# Patient Record
Sex: FEMALE | Race: WHITE | NOT HISPANIC OR LATINO | Employment: FULL TIME | URBAN - METROPOLITAN AREA
[De-identification: names, ages, dates, MRNs, and addresses within clinical notes are randomized per-mention and may not be internally consistent; named-entity substitution may affect disease eponyms.]

---

## 2017-01-11 DIAGNOSIS — E55.9 VITAMIN D DEFICIENCY: Primary | ICD-10-CM

## 2017-01-11 NOTE — TELEPHONE ENCOUNTER
Received refill request for vitamin D.  Last in clinic 6/2016 where vitamin D was checked and WNL. Ok to refill until next annual per protocol. Refill sent.

## 2017-06-22 ENCOUNTER — OFFICE VISIT (OUTPATIENT)
Dept: OBGYN | Facility: CLINIC | Age: 37
End: 2017-06-22
Attending: OBSTETRICS & GYNECOLOGY
Payer: COMMERCIAL

## 2017-06-22 VITALS
WEIGHT: 195.5 LBS | HEIGHT: 70 IN | DIASTOLIC BLOOD PRESSURE: 69 MMHG | HEART RATE: 76 BPM | SYSTOLIC BLOOD PRESSURE: 115 MMHG | BODY MASS INDEX: 27.99 KG/M2

## 2017-06-22 DIAGNOSIS — R79.89 LOW VITAMIN D LEVEL: ICD-10-CM

## 2017-06-22 DIAGNOSIS — Z00.00 VISIT FOR PREVENTIVE HEALTH EXAMINATION: ICD-10-CM

## 2017-06-22 DIAGNOSIS — Z01.419 ENCOUNTER FOR GYNECOLOGICAL EXAMINATION WITHOUT ABNORMAL FINDING: Primary | ICD-10-CM

## 2017-06-22 DIAGNOSIS — Z12.4 SCREENING FOR MALIGNANT NEOPLASM OF CERVIX: ICD-10-CM

## 2017-06-22 PROCEDURE — 82306 VITAMIN D 25 HYDROXY: CPT | Performed by: OBSTETRICS & GYNECOLOGY

## 2017-06-22 PROCEDURE — 36415 COLL VENOUS BLD VENIPUNCTURE: CPT | Performed by: OBSTETRICS & GYNECOLOGY

## 2017-06-22 PROCEDURE — 87624 HPV HI-RISK TYP POOLED RSLT: CPT | Performed by: OBSTETRICS & GYNECOLOGY

## 2017-06-22 PROCEDURE — 99213 OFFICE O/P EST LOW 20 MIN: CPT | Mod: ZF

## 2017-06-22 PROCEDURE — G0145 SCR C/V CYTO,THINLAYER,RESCR: HCPCS | Performed by: OBSTETRICS & GYNECOLOGY

## 2017-06-22 ASSESSMENT — PAIN SCALES - GENERAL: PAINLEVEL: NO PAIN (0)

## 2017-06-22 NOTE — LETTER
6/27/2017         Filomena Mendoza   [address]  [place] [address]      Dear Ms. Mendoza:    The results of your recent PAP Smear were normal. The HPV test will be back soon.  Your vitamin D level is just a little low.  For values less than 30 we recommend 7442-0217 IU/day for 8 weeks then decrease to 2000IU/day.  We should repeat the vitamin D level in 3 months.  Please let me know if you have questions.    Results for orders placed or performed in visit on 06/22/17   25- OH-Vitamin D   Result Value Ref Range    Vitamin D Deficiency screening 29 20 - 75 ug/L   Pap imaged thin layer screen with HPV - recommended age 30 - 65 years (select HPV order below)   Result Value Ref Range    PAP NIL     Copath Report         Patient Name: FILOMENA MENDOZA  MR#: [id number]  Specimen #: [id number]  Collected: 6/22/2017  Received: 6/23/2017  Reported: 6/26/2017 15:37  Ordering Phy(s): LAURIE MCKENNA    For improved result formatting, select 'View Enhanced Report Format'  under Linked Documents section.    SPECIMEN/STAIN PROCESS:  Pap imaged thin layer prep screening (Surepath, FocalPoint with guided  screening)       Pap-Cyto x 1, HPV ordered x 1    SOURCE: Cervical, endocervical  ----------------------------------------------------------------   Pap imaged thin layer prep screening (Surepath, FocalPoint with guided  screening)  SPECIMEN ADEQUACY:  Satisfactory for evaluation.  -Transformation zone component present.    CYTOLOGIC INTERPRETATION:    Negative for intraepithelial lesion or malignancy    Electronically signed out by:  SWETA Keene ( ASCP)    Processed and screened at [facility],  [facility]    CLINICAL HISTORY:  LMP: 6/17/2017  Previous normal p ap  Date of Last Pap: 6/15/2016, History of positive HPV: 2016,    Papanicolaou Test Limitations:  Cervical cytology is a screening test  with limited sensitivity; regular screening is critical for  cancer  prevention; Pap tests are primarily effective for the  diagnosis/prevention of squamous cell carcinoma, not adenocarcinomas or  other cancers.    TESTING LAB LOCATION:  [facility], [address]  [id number] [phone]    COLLECTION SITE:  Client:  [facility]  Location: [facility] (B)           Please note that test explanations are brief and do not reflect all diagnostic uses.  If you have any questions or concerns, please call the clinic at [phone].      Sincerely,      Catrina Edge MD

## 2017-06-22 NOTE — LETTER
6/29/2017         Filomena Mendoza   [address]  [place] [address]        Dear Ms. Mendoza:    The results of your recent pap and HPV were normal. This means you have cleared the HPV that was positive from your pap test one year ago.  Your next pap smear is due in 5 years.  Your vitamin D level is just below where we would like it, our goal is above 30.  We recommend that you take 5589-0833 IU/day for the next 8 weeks and then continue to supplement with 2000 IU/day.  Please let us know if you have any questions. Take care, Dr. Mckenna    Results for orders placed or performed in visit on 06/22/17   25- OH-Vitamin D   Result Value Ref Range    Vitamin D Deficiency screening 29 20 - 75 ug/L   Pap imaged thin layer screen with HPV - recommended age 30 - 65 years (select HPV order below)   Result Value Ref Range    PAP NIL     Copath Report         Patient Name: FILOMENA MENDOZA  MR#: [id number]  Specimen #: [id number]  Collected: 6/22/2017  Received: 6/23/2017  Reported: 6/26/2017 15:37  Ordering Phy(s): LAURIE MCKENNA    For improved result formatting, select 'View Enhanced Report Format'  under Linked Documents section.    SPECIMEN/STAIN PROCESS:  Pap imaged thin layer prep screening (Surepath, FocalPoint with guided  screening)       Pap-Cyto x 1, HPV ordered x 1    SOURCE: Cervical, endocervical  ----------------------------------------------------------------   Pap imaged thin layer prep screening (Surepath, FocalPoint with guided  screening)  SPECIMEN ADEQUACY:  Satisfactory for evaluation.  -Transformation zone component present.    CYTOLOGIC INTERPRETATION:    Negative for intraepithelial lesion or malignancy    Electronically signed out by:  SWETA Keene ( ASCP)    Processed and screened at [facility],  [facility]    CLINICAL HISTORY:  LMP: 6/17/2017  Previous normal p ap  Date of Last Pap: 6/15/2016, History of positive HPV:  2016,    Papanicolaou Test Limitations:  Cervical cytology is a screening test  with limited sensitivity; regular screening is critical for cancer  prevention; Pap tests are primarily effective for the  diagnosis/prevention of squamous cell carcinoma, not adenocarcinomas or  other cancers.    TESTING LAB LOCATION:  [facility], [address]  [address]  [phone]    COLLECTION SITE:  Client:  [facility]  Location: [facility] (B)     HPV High Risk Types DNA Cervical   Result Value Ref Range    HPV 16 DNA Negative NEG    HPV 18 DNA Negative NEG    Other HR HPV Negative NEG    Final Diagnosis       This patient's sample is negative for HPV DNA.  (Note)  METHODOLOGY:  The Roche chelsea 4800 system uses automated extraction,  simultaneous amplification of HPV (L1 region) and beta-globin,  followed by  real time detection of fluorescent labeled HPV and beta  globin using specific oligonucleotide probes . The test specifically  identifies types HPV 16 DNA and HPV 18 DNA while concurrently  detecting the rest of the high risk types (31, 33, 35, 39, 45, 51,  52, 56, 58, 59, 66 or 68).    COMMENTS:  This test is not intended for use as a screening device  for women under age 30 with normal cervical cytology.  Results should  be correlated with cytologic and histologic findings. Close clinical  followup is recommended.    This test was developed and its performance characteristics  determined by the [facility], [facility]. It has not been cleared or approved by the  FDA. The laboratory is regulated under CLIA as qualified to perform  high- complexity testing. This test is used for clinical purposes. It  should not be regarded as investigational or for research.        Specimen Description Cervical Cells  [id number]            Please note that test explanations are  brief and do not reflect all diagnostic uses.  If you have any questions or concerns, please call the clinic at [phone].      Sincerely,      Catrina Edge MD

## 2017-06-22 NOTE — PROGRESS NOTES
ROS        Progress Note    SUBJECTIVE:  Katty Mendoza is an 36 year old, , who requests an Annual Preventive Exam.       Concerns today include: Desires recheck of vitamin D.  Not currently using contraception, but not planning pregnancy either.  She is also due for repeat pap after NIL/other HR HPV 1 year ago.    Menstrual History:  Menstrual History 6/15/2016 6/15/2016 2017   LAST MENSTRUAL PERIOD 6/3/2016 - 2017   Menarche age - - -   Period Cycle (Days) - 24-28 -   Period Duration (Days) - 5 -   Method of Contraception - Condoms -   Period Pattern - Regular -   Menstrual Flow - Moderate -   Menstrual Control - - -   Dysmenorrhea - - -   Reviewed Today - Yes -       Last    Lab Results   Component Value Date    PAP NIL 06/15/2016     History of abnormal Pap smear: YES - updated in Problem List and Health Maintenance accordingly    Last   Lab Results   Component Value Date    HPV16 Negative 06/15/2016     Last   Lab Results   Component Value Date    HPV18 Negative 06/15/2016     Last   Lab Results   Component Value Date    HRHPV Positive 06/15/2016       Mammogram current: not applicable  Last Mammogram:   No results found.     Last Colonoscopy:  No results found for this or any previous visit.      HISTORY:    Current Outpatient Prescriptions on File Prior to Visit:  tretinoin (RETIN-A) 0.025 % cream Apply a pea sized amount nightly   Omega-3 Fatty Acids (OMEGA-3 FISH OIL PO)    cholecalciferol (VITAMIN  -D) 1000 UNITS capsule Take 4 capsules (4,000 Units) by mouth daily Take one capsule daily. (Patient not taking: Reported on 2017)     No current facility-administered medications on file prior to visit.   No Known Allergies  Immunization History   Administered Date(s) Administered     HPVQuadrivalent 2007, 2007, 10/23/2008     Hepatitis B 2004, 2005     Influenza (IIV3) 2005, 2005, 10/23/2008     TD (ADULT, 7+) 1996, 2002, 2005     Tdap  (Adacel,Boostrix) 2009       Obstetric History       T0      L0     SAB0   TAB0   Ectopic0   Multiple0   Live Births0      Past Medical History:   Diagnosis Date     Anxiety      Past Surgical History:   Procedure Laterality Date     BREAST BIOPSY, RT/LT Right 2008    times 2; fibroadenoma     BREAST SURGERY  ,2013     GYN SURGERY  2002     OOPHORECTOMY Right 2002    complete Dr. Villalta     OOPHORECTOMY Left 2002    partial left  Dr. Villalta     Family History   Problem Relation Age of Onset     CANCER Paternal Grandmother      melanoma     Unknown/Adopted No family hx of      Asthma No family hx of      C.A.D. 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     Social History Main Topics     Smoking status: Never Smoker     Smokeless tobacco: Never Used     Alcohol use 0.5 - 1.0 oz/week     Drug use: No     Sexual activity: Not Currently     Partners: Male     Birth control/ protection: Condom, None     Other Topics Concern      Service No     Blood Transfusions No     Caffeine Concern No     Occupational Exposure No     Hobby Hazards No     Sleep Concern No     Stress Concern Yes     needs alone time     Weight Concern No     Special Diet No     Back Care No     Exercise Yes     Bike Helmet Yes     Seat Belt Yes     Self-Exams Yes     Social History Narrative    How much exercise per week? Runs 3-4 times per week    How much calcium per day? Some, milk and yogurt       How much caffeine per day? 1-2 cups per day    How much vitamin D per day? None    Do you/your family wear seatbelts?  Yes    Do you/your family use safety helmets? Yes    Do you/your  "family use sunscreen? Yes    Do you/your family keep firearms in the home? No    Do you/your family have a smoke detector(s)? Yes        Do you feel safe in your home? Yes    Has anyone ever touched you in an unwanted manner? No     Explain          Maida 10, 2015 Estela Rodriguez LPN                    5/14/14        Lives in [place]. Enjoys running and road biking.     Dating, heterosexual.     Megan Uriostegui MD               ROS  [unfilled]  PHQ-9 SCORE 6/10/2015   Total Score 2     AGUSTIN-7 SCORE 6/15/2016   Total Score 4 (minimal anxiety)         EXAM:  Blood pressure 115/69, pulse 76, height 1.778 m (5' 10\"), weight 88.7 kg (195 lb 8 oz), last menstrual period 06/17/2017, not currently breastfeeding. Body mass index is 28.05 kg/(m^2).  General - pleasant female in no acute distress.  Skin - no suspicious lesions or rashes  EENT-  PERRLA, euthyroid with out palpable nodules  Neck - supple without lymphadenopathy.  Lungs - clear to auscultation bilaterally.  Heart - regular rate and rhythm without murmur.  Abdomen - soft, nontender, nondistended, no masses or organomegaly noted.  Musculoskeletal - no gross deformities.  Neurological - normal strength, sensation, and mental status.    Breast Exam:  Breast: Without visible skin changes. No dimpling or lesions seen.   Breasts supple, non-tender with palpation, no dominant mass, nodularity, or nipple discharge noted bilaterally. Axillary nodes negative.      Pelvic Exam:  EG/BUS: Normal genital architecture without lesions, erythema or abnormal secretions Bartholin's, Urethra, [place]'s normal   Urethral meatus: normal   Urethra: no masses, tenderness, or scarring   Bladder: no masses or tenderness   Vagina: moist, pink, rugae with creamy, white and odorless  secretions  Cervix: Nulliparous, and no lesions  Uterus: anteverted,  and small, smooth, firm, mobile w/o pain  Adnexa: Within normal limits and No masses, nodularity, " tenderness  Rectum:anus normal       ASSESSMENT:  Encounter Diagnoses   Name Primary?     Encounter for gynecological examination without abnormal finding Yes     Low vitamin D level      Screening for malignant neoplasm of cervix      Visit for preventive health examination         PLAN:   Orders Placed This Encounter   Procedures     Pelvic and Breast Exam Procedure []     Pap Smear Exam []     25- OH-Vitamin D     Pap imaged thin layer screen with HPV - recommended age 30 - 65 years (select HPV order below)     HPV High Risk Types DNA Cervical     No orders of the defined types were placed in this encounter.      Additional teaching done at this visit regarding birth control which she declines for now and pap smear follow-up.    Return to clinic in one year.  Follow-up as needed.      Catrina Edge MD

## 2017-06-22 NOTE — LETTER
2017       RE: Katty Mendoza  [address]  [place] [address]     Dear Colleague,    Thank you for referring your patient, Katty Mendoza, to the [facility] at [facility]. Please see a copy of my visit note below.    ROS        Progress Note    SUBJECTIVE:  Katty Mendoza is an 36 year old, , who requests an Annual Preventive Exam.       Concerns today include: Desires recheck of vitamin D.  Not currently using contraception, but not planning pregnancy either.  She is also due for repeat pap after NIL/other HR HPV 1 year ago.    Menstrual History:  Menstrual History 6/15/2016 6/15/2016 2017   LAST MENSTRUAL PERIOD 6/3/2016 - 2017   Menarche age - - -   Period Cycle (Days) - 24-28 -   Period Duration (Days) - 5 -   Method of Contraception - Condoms -   Period Pattern - Regular -   Menstrual Flow - Moderate -   Menstrual Control - - -   Dysmenorrhea - - -   Reviewed Today - Yes -       Last    Lab Results   Component Value Date    PAP NIL 06/15/2016     History of abnormal Pap smear: YES - updated in Problem List and Health Maintenance accordingly    Last   Lab Results   Component Value Date    HPV16 Negative 06/15/2016     Last   Lab Results   Component Value Date    HPV18 Negative 06/15/2016     Last   Lab Results   Component Value Date    HRHPV Positive 06/15/2016       Mammogram current: not applicable  Last Mammogram:   No results found.     Last Colonoscopy:  No results found for this or any previous visit.      HISTORY:    Current Outpatient Prescriptions on File Prior to Visit:  tretinoin (RETIN-A) 0.025 % cream Apply a pea sized amount nightly   Omega-3 Fatty Acids (OMEGA-3 FISH OIL PO)    cholecalciferol (VITAMIN  -D) 1000 UNITS capsule Take 4 capsules (4,000 Units) by mouth daily Take one capsule daily. (Patient not taking: Reported on 2017)     No current facility-administered medications on file  prior to visit.   No Known Allergies  Immunization History   Administered Date(s) Administered     HPVQuadrivalent 2007, 2007, 10/23/2008     Hepatitis B 2004, 2005     Influenza (IIV3) 2005, 2005, 10/23/2008     TD (ADULT, 7+) 1996, 2002, 2005     Tdap (Adacel,Boostrix) 2009       Obstetric History       T0      L0     SAB0   TAB0   Ectopic0   Multiple0   Live Births0      Past Medical History:   Diagnosis Date     Anxiety      Past Surgical History:   Procedure Laterality Date     BREAST BIOPSY, RT/LT Right 2008    times 2; fibroadenoma     BREAST SURGERY  ,     GYN SURGERY  2002     OOPHORECTOMY Right     complete Dr. Villalta     OOPHORECTOMY Left 2002    partial left  Dr. Villalta     Family History   Problem Relation Age of Onset     CANCER Paternal Grandmother      melanoma     Unknown/Adopted No family hx of      Asthma No family hx of      C.A.D. 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     Social History Main Topics     Smoking status: Never Smoker     Smokeless tobacco: Never Used     Alcohol use 0.5 - 1.0 oz/week     Drug use: No     Sexual activity: Not Currently     Partners: Male     Birth control/ protection: Condom, None     Other Topics Concern      Service No     Blood Transfusions No     Caffeine Concern No     Occupational Exposure No     Hobby Hazards No     Sleep Concern No     Stress Concern Yes     needs alone time     Weight Concern No     Special Diet No     Back Care No     Exercise Yes     Bike Helmet Yes     Seat Belt Yes     Self-Exams Yes     Social  "History Narrative    How much exercise per week? Runs 3-4 times per week    How much calcium per day? Some, milk and yogurt       How much caffeine per day? 1-2 cups per day    How much vitamin D per day? None    Do you/your family wear seatbelts?  Yes    Do you/your family use safety helmets? Yes    Do you/your family use sunscreen? Yes    Do you/your family keep firearms in the home? No    Do you/your family have a smoke detector(s)? Yes        Do you feel safe in your home? Yes    Has anyone ever touched you in an unwanted manner? No     Explain          Maida 10, 2015 Estela Rodriguez LPN                    5/14/14        Lives in [place] the [employer]. Enjoys running and road biking.     Dating, heterosexual.     Megan Uriostegui MD               ROS  [unfilled]  PHQ-9 SCORE 6/10/2015   Total Score 2     AGUSTIN-7 SCORE 6/15/2016   Total Score 4 (minimal anxiety)         EXAM:  Blood pressure 115/69, pulse 76, height 1.778 m (5' 10\"), weight 88.7 kg (195 lb 8 oz), last menstrual period 06/17/2017, not currently breastfeeding. Body mass index is 28.05 kg/(m^2).  General - pleasant female in no acute distress.  Skin - no suspicious lesions or rashes  EENT-  PERRLA, euthyroid with out palpable nodules  Neck - supple without lymphadenopathy.  Lungs - clear to auscultation bilaterally.  Heart - regular rate and rhythm without murmur.  Abdomen - soft, nontender, nondistended, no masses or organomegaly noted.  Musculoskeletal - no gross deformities.  Neurological - normal strength, sensation, and mental status.    Breast Exam:  Breast: Without visible skin changes. No dimpling or lesions seen.   Breasts supple, non-tender with palpation, no dominant mass, nodularity, or nipple discharge noted bilaterally. Axillary nodes negative.      Pelvic Exam:  EG/BUS: Normal genital architecture without lesions, erythema or abnormal secretions Bartholin's, Urethra, [place]'s normal   Urethral meatus: normal   Urethra: no " masses, tenderness, or scarring   Bladder: no masses or tenderness   Vagina: moist, pink, rugae with creamy, white and odorless  secretions  Cervix: Nulliparous, and no lesions  Uterus: anteverted,  and small, smooth, firm, mobile w/o pain  Adnexa: Within normal limits and No masses, nodularity, tenderness  Rectum:anus normal       ASSESSMENT:  Encounter Diagnoses   Name Primary?     Encounter for gynecological examination without abnormal finding Yes     Low vitamin D level      Screening for malignant neoplasm of cervix      Visit for preventive health examination         PLAN:   Orders Placed This Encounter   Procedures     Pelvic and Breast Exam Procedure []     Pap Smear Exam []     25- OH-Vitamin D     Pap imaged thin layer screen with HPV - recommended age 30 - 65 years (select HPV order below)     HPV High Risk Types DNA Cervical     No orders of the defined types were placed in this encounter.      Additional teaching done at this visit regarding birth control which she declines for now and pap smear follow-up.    Return to clinic in one year.  Follow-up as needed.  Catrina Edge MD

## 2017-06-22 NOTE — MR AVS SNAPSHOT
After Visit Summary   6/22/2017    Katty Mendoza    MRN: [id number]           Patient Information     Date Of Birth          1980        Visit Information        Provider Department      6/22/2017 3:00 PM Catrina Edge MD [facility]        Today's Diagnoses     Encounter for gynecological examination without abnormal finding    -  1    Low vitamin D level        Screening for malignant neoplasm of cervix        Visit for preventive health examination          Care Instructions      PREVENTIVE HEALTH RECOMMENDATIONS:   Most women need a yearly breast and pelvic exam.    A PAP screen, a test done DURING a pelvic exam, is NO longer recommended yearly.    March 2013, screening guidelines recommended by ACOG for PAP screen are:    1) First pap at age 21.    2) Pap every 3 years until age 30.    3) After age 30, pap every 3 years or Pap with HR HPV screen every 5 years until age 65.  4) Women do NOT need a vaginal Pap screen after a hysterectomy (surgical removal of the uterus) when they have not had cancer.    Exceptions:  1) Yearly pap if HIV+ or immunosuppressed secondary to organ transplant  2) ELAINA II-III continue routine screening for 20 years.    I encourage you continue looking for opportunities to choose a healthy lifestyle:       * Choose to eat a heart healthy diet. Check out the FOOD PLATE guidelines at: http://www.choosemyplate.gov/ for helpful hints on weight and cholesterol management.  Balance your caloric intake with exercise to maintain a BMI in the 22 to 26 range. For bone health: Eat calcium-rich foods like yogurt, broccoli or take chewable calcium pills (500 to 600 mg) twice a day with food.       * Exercise for at least an average of 30 minutes a day, 5 days of the week. This will help you control your weight, release stress, and help prevent disease.      * Take a Vitamin D3 supplement daily fall through spring and during summer unless you [id number]' full  body sun exposure to skin without sunscreen.      * DO wear sunscreen to prevent skin cancer after the first 15-30 minutes.      * Identify stressors in your life, find ways to release the stress, and, make time for yourself. PLEASE ask for help if mood changes last longer than two weeks.     * Limit alcohol to one drink per day.  No smoking.  Avoid second hand smoke. If you smoke, ask for help to stop.       *  If you are in a sexual relationship, talk with your partner about possible infection risks and take action to protect yourself from exposure to a sexual infection.    Please request an infection screen for STIs (sexually transmitted infections) if you are less than age 26 OR believe that you may be at risk.     Get a flu shot each year. Get a tetanus shot every 10 years. EVERYONE needs a pertussis (Whooping cough) booster.    See your dentist twice a year for an exam and preventive care cleaning.     Consider the following screen tests:    1) cholesterol test every 5 years.     2) yearly mammogram after age 40 unless you have identified risks.    3) colonoscopy every 10 years after age 50 unless you have identified risks.    4) diabetes blood test screening if you are at risk for diabetes.      Additional information that you may also find helpful:  The Internet now gives us access to LOTS of information -- some of it helpful, research documented and also plenty of harmful, anecdotal information that may not pertain to your situtaion. Consider visiting the following websites for accurate health information:    www.vitamindcouncil.org/ : Info and ongoing research re Vitamin D    www.fairview.org : Up to date and easily searchable information on multiple topics.    www.medlineplus.gov : medication info, interactive tutorials, watch real surgeries online    www.cdc.gov : public health info, travel advisories, epidemics (H1N1)    www.jennifer/std.org: current research re diagnosis, treatment and prevention of  sexually contacted infections.    www.health.state.[place] : [place] dept of heat, public health issues in [place], N1N1    www.familydoctor.org : good info from the Academy of Family Physicians                Follow-ups after your visit        Follow-up notes from your care team     Return in 1 year (on 2018) for Preventative Health Visit.      Your next 10 appointments already scheduled     2017  8:30 AM CDT   (Arrive by 8:15 AM)   Return Visit with Olga Mondragon MD   [facility] ([facility])    [address] [place] [id number] [phone]              Who to contact     Please call your clinic at [phone] to:    Ask questions about your health    Make or cancel appointments    Discuss your medicines    Learn about your test results    Speak to your doctor   If you have compliments or concerns about an experience at your clinic, or if you wish to file a complaint, please contact [facility] Patient Relations at [phone] or email us at [email or web address]         Additional Information About Your Visit        Vir[employer] System[employer]art Information     [employer] is an electronic gateway that provides easy, online access to your medical records. With coin4ce, you can request a clinic appointment, read your test results, renew a prescription or communicate with your care team.     To sign up for [employer] visit the website at [email or web address]   You will be asked to enter the access code listed below, as well as some personal information. Please follow the directions to create your username and password.     Your access code is: [email or web address]  Expires: 2017  6:31 AM     Your access code will  in 90 days. If you need help or a new code, please contact your [facility] or call [phone] for assistance.        Care EveryWhere ID     This is your Care EveryWhere ID. This could  "be used by other organizations to access your [place] medical records  [id number]        Your Vitals Were     Pulse Height Last Period Breastfeeding? BMI (Body Mass Index)       76 1.778 m (5' 10\") 06/17/2017 No 28.05 kg/m2        Blood Pressure from Last 3 Encounters:   06/22/17 115/69   06/15/16 114/72   01/14/16 119/80    Weight from Last 3 Encounters:   06/22/17 88.7 kg (195 lb 8 oz)   06/15/16 83 kg (182 lb 14.4 oz)   01/14/16 85.9 kg (189 lb 4.8 oz)              We Performed the Following     25- [place]-Vitamin D     HPV High Risk Types DNA Cervical     Pap imaged thin layer screen with HPV - recommended age 30 - 65 years (select HPV order below)     Pap Smear Exam []     Pelvic and Breast Exam Procedure []        Primary Care Provider    Physician No Ref-Primary       No address on file        Equal Access to Services     KATJA JAMES : Hadii luigi Morris, waaxda lorena, qaybta kaalmada lynn, france guallpa . So [facility] [phone].    ATENCIÓN: Si habla ian, tiene a terrell disposición servicios gratuitos de asistencia lingüística. Llame al [phone].    We comply with applicable federal civil rights laws and [place] laws. We do not discriminate on the basis of race, color, national origin, age, disability sex, sexual orientation or gender identity.            Thank you!     Thank you for choosing [facility]  for your care. Our goal is always to provide you with excellent care. Hearing back from our patients is one way we can continue to improve our services. Please take a few minutes to complete the written survey that you may receive in the mail after your visit with us. Thank you!             Your Updated Medication List - Protect others around you: Learn how to safely use, store and throw away your medicines at www.disposemymeds.org.          This list is accurate as of: 6/22/17 11:59 PM.  Always use your most recent med list. "                   Brand Name Dispense Instructions for use Diagnosis    cholecalciferol 1000 UNITS capsule    vitamin  -D    360 capsule    Take 4 capsules (4,000 Units) by mouth daily Take one capsule daily.    Vitamin D deficiency       OMEGA-3 FISH OIL PO           tretinoin 0.025 % cream    RETIN-A    45 g    Apply a pea sized amount nightly    Acne vulgaris

## 2017-06-23 LAB — DEPRECATED CALCIDIOL+CALCIFEROL SERPL-MC: 29 UG/L (ref 20–75)

## 2017-06-26 LAB
COPATH REPORT: NORMAL
PAP: NORMAL

## 2017-06-27 LAB
FINAL DIAGNOSIS: NORMAL
HPV HR 12 DNA CVX QL NAA+PROBE: NEGATIVE
HPV16 DNA SPEC QL NAA+PROBE: NEGATIVE
HPV18 DNA SPEC QL NAA+PROBE: NEGATIVE
SPECIMEN DESCRIPTION: NORMAL

## 2017-06-30 NOTE — PATIENT INSTRUCTIONS

## 2018-03-09 ENCOUNTER — OFFICE VISIT (OUTPATIENT)
Dept: INTERNAL MEDICINE | Facility: CLINIC | Age: 38
End: 2018-03-09
Attending: INTERNAL MEDICINE
Payer: COMMERCIAL

## 2018-03-09 VITALS
HEIGHT: 70 IN | DIASTOLIC BLOOD PRESSURE: 92 MMHG | HEART RATE: 68 BPM | SYSTOLIC BLOOD PRESSURE: 133 MMHG | WEIGHT: 192.3 LBS | BODY MASS INDEX: 27.53 KG/M2

## 2018-03-09 DIAGNOSIS — K64.4 EXTERNAL HEMORRHOIDS: Primary | ICD-10-CM

## 2018-03-09 NOTE — PATIENT INSTRUCTIONS
Colace (Docusate)--to soften stools around time expect constipation   100 mg twice/day    --If having constipation--try taking miralax daily. May be something to add as preparing to travel.       Hemorrhoids    Hemorrhoids are swollen and inflamed veins inside the rectum and near the anus. The rectum is the last several inches of the colon. The anus is the passage between the rectum and the outside of the body.  Causes  The veins can become swollen due to increased pressure in them. This is most often caused by:    Chronic constipation or diarrhea    Straining when having a bowel movement    Sitting too long on the toilet    A low-fiber diet    Pregnancy  Symptoms    Bleeding from the rectum (this may be noticeable after bowel movements)    Lump near the anus    Itching around the anus    Pain around the anus  There are different types of hemorrhoids. Depending on the type you have and the severity, you may be able to treat yourself at home. In some cases, a procedure may be the best treatment option. Your healthcare provider can tell you more about this, if needed.  Home care  General care    To get relief from pain or itching, try:    Topical products. Your healthcare provider may prescribe or recommend creams, ointments, or pads that can be applied to the hemorrhoid. Use these exactly as directed.    Medicines. Your healthcare provider may recommend stool softeners, suppositories, or laxatives to help manage constipation. Use these exactly as directed.    Sitz baths. A sitz bath involves sitting in a few inches of warm bath water. Be careful not to make the water so hot that you burn yourself--test it before sitting in it. Soak for about 10 to 15 minutes a few times a day. This may help relieve pain.  Tips to help prevent hemorrhoids    Eat more fiber. Fiber adds bulk to stool and absorbs water as it moves through your colon. This makes stool softer and easier to pass.    Increase the fiber in your diet with more  fiber-rich foods. These include fresh fruit, vegetables, and whole grains.    Take a fiber supplement or bulking agent, if advised to by your provider. These include products such as psyllium or methylcellulose.    Drink plenty of water, if directed to by your provider. This can help keep stool soft.    Be more active. Frequent exercise aids digestion and helps prevent constipation. It may also help make bowel movements more regular.    Don t strain during bowel movements. This can make hemorrhoids more likely. Also, don t sit on the toilet for long periods of time.  Follow-up care  Follow up with your healthcare provider, or as advised. If a culture or imaging tests were done, you will be notified of the results when they are ready. This may take a few days or longer.  When to seek medical advice  Call your healthcare provider right away if any of these occur:    Increased bleeding from the rectum    Increased pain around the rectum or anus    Weakness or dizziness  Call 911  Call 911 or return to the emergency department right away if any of these occur:    Trouble breathing or swallowing    Fainting or loss of consciousness    Unusually fast heart rate    Vomiting blood    Large amounts of blood in stool  Date Last Reviewed: 6/22/2015 2000-2017 [employer] [address] [place] [address], [place] [address]. All rights reserved. This information is not intended as a substitute for professional medical care. Always follow your healthcare professional's instructions.

## 2018-03-09 NOTE — MR AVS SNAPSHOT
After Visit Summary   3/9/2018    Katty Mendoza    MRN: [id number]           Patient Information     Date Of Birth          1980        Visit Information        Provider Department      3/9/2018 7:30 AM Cheri Tatum MD [facility]         Care Instructions    Colace (Docusate)--to soften stools around time expect constipation   100 mg twice/day    --If having constipation--try taking miralax daily. May be something to add as preparing to travel.       Hemorrhoids    Hemorrhoids are swollen and inflamed veins inside the rectum and near the anus. The rectum is the last several inches of the colon. The anus is the passage between the rectum and the outside of the body.  Causes  The veins can become swollen due to increased pressure in them. This is most often caused by:    Chronic constipation or diarrhea    Straining when having a bowel movement    Sitting too long on the toilet    A low-fiber diet    Pregnancy  Symptoms    Bleeding from the rectum (this may be noticeable after bowel movements)    Lump near the anus    Itching around the anus    Pain around the anus  There are different types of hemorrhoids. Depending on the type you have and the severity, you may be able to treat yourself at home. In some cases, a procedure may be the best treatment option. Your healthcare provider can tell you more about this, if needed.  Home care  General care    To get relief from pain or itching, try:    Topical products. Your healthcare provider may prescribe or recommend creams, ointments, or pads that can be applied to the hemorrhoid. Use these exactly as directed.    Medicines. Your healthcare provider may recommend stool softeners, suppositories, or laxatives to help manage constipation. Use these exactly as directed.    Sitz baths. A sitz bath involves sitting in a few inches of warm bath water. Be careful not to make the water so hot that you burn yourself--test it  before sitting in it. Soak for about 10 to 15 minutes a few times a day. This may help relieve pain.  Tips to help prevent hemorrhoids    Eat more fiber. Fiber adds bulk to stool and absorbs water as it moves through your colon. This makes stool softer and easier to pass.    Increase the fiber in your diet with more fiber-rich foods. These include fresh fruit, vegetables, and whole grains.    Take a fiber supplement or bulking agent, if advised to by your provider. These include products such as psyllium or methylcellulose.    Drink plenty of water, if directed to by your provider. This can help keep stool soft.    Be more active. Frequent exercise aids digestion and helps prevent constipation. It may also help make bowel movements more regular.    Don t strain during bowel movements. This can make hemorrhoids more likely. Also, don t sit on the toilet for long periods of time.  Follow-up care  Follow up with your healthcare provider, or as advised. If a culture or imaging tests were done, you will be notified of the results when they are ready. This may take a few days or longer.  When to seek medical advice  Call your healthcare provider right away if any of these occur:    Increased bleeding from the rectum    Increased pain around the rectum or anus    Weakness or dizziness  Call 911  Call 911 or return to the emergency department right away if any of these occur:    Trouble breathing or swallowing    Fainting or loss of consciousness    Unusually fast heart rate    Vomiting blood    Large amounts of blood in stool  Date Last Reviewed: 6/22/2015 2000-2017 [employer]. [address] [place] [address], [place] [address]. All rights reserved. This information is not intended as a substitute for professional medical care. Always follow your healthcare professional's instructions.                Follow-ups after your visit        Your next 10 appointments already scheduled     Jun 27, 2018  8:00 AM CDT  "  Annual Visit with Megan Uriostegui MD   [facility] ([facility])    [facility] [address]  [address]  [facility] [id number] [phone]              Who to contact     Please call your clinic at [phone] to:    Ask questions about your health    Make or cancel appointments    Discuss your medicines    Learn about your test results    Speak to your doctor            Additional Information About Your Visit        MyChart Information     [employer] is an electronic gateway that provides easy, online access to your medical records. With [employer], you can request a clinic appointment, read your test results, renew a prescription or communicate with your care team.     To sign up for [employer] visit the website at [email or web address]   You will be asked to enter the access code listed below, as well as some personal information. Please follow the directions to create your username and password.     Your access code is: [email or web address]  Expires: 2018  6:30 AM     Your access code will  in 90 days. If you need help or a new code, please contact your [facility] or call [phone] for assistance.        Care EveryWhere ID     This is your Care EveryWhere ID. This could be used by other organizations to access your [place] medical records  [id number]        Your Vitals Were     Pulse Height BMI (Body Mass Index)             68 1.778 m (5' 10\") 27.59 kg/m2          Blood Pressure from Last 3 Encounters:   18 (!) 133/92   17 115/69   06/15/16 114/72    Weight from Last 3 Encounters:   18 87.2 kg (192 lb 4.8 oz)   17 88.7 kg (195 lb 8 oz)   06/15/16 83 kg (182 lb 14.4 oz)              Today, you had the following     No orders found for display       Primary Care Provider Fax #    Physician No Ref-Primary [phone]       No address on file        Equal Access to Services     FILISA " GAAR : Hadii aad char shanta Morris, watayda luqadaha, qaybta kaarlin elizvarshalazaro, waxjonas valeria tiradobonilibia vleez. So [facility] [phone].    ATENCIÓN: Si habla español, tiene a terrell disposición servicios gratuitos de asistencia lingüística. Llame al [phone].    We comply with applicable federal civil rights laws and [place] laws. We do not discriminate on the basis of race, color, national origin, age, disability, sex, sexual orientation, or gender identity.            Thank you!     Thank you for choosing [facility]   for your care. Our goal is always to provide you with excellent care. Hearing back from our patients is one way we can continue to improve our services. Please take a few minutes to complete the written survey that you may receive in the mail after your visit with us. Thank you!             Your Updated Medication List - Protect others around you: Learn how to safely use, store and throw away your medicines at www.disposemymeds.org.          This list is accurate as of 3/9/18  7:58 AM.  Always use your most recent med list.                   Brand Name Dispense Instructions for use Diagnosis    cholecalciferol 1000 UNITS capsule    vitamin  -D    360 capsule    Take 4 capsules (4,000 Units) by mouth daily Take one capsule daily.    Vitamin D deficiency       OMEGA-3 FISH OIL PO           tretinoin 0.025 % cream    RETIN-A    45 g    Apply a pea sized amount nightly    Acne vulgaris

## 2018-03-09 NOTE — LETTER
3/9/2018       RE: Katty Mendoza  [address]  [place] [address]     Dear Colleague,    Thank you for referring your patient, Katty Mendoza, to the [facility]  at [facility] Please see a copy of my visit note below.                               SUBJECTIVE:  Katty Mendoza is a 37 year old female with no significant pmh who comes in for blood in her stools. This happens about 25% of the time. It feels like it's around the time of her period. It is around the outside of her stool. It is a bright raspberry color. Will have blood on the toilet paper after wiping. It is not mixed in with the stool. It mostly happens when she has harder stools. She tries to drink enough water. Does also note more constipation with period. No abd pain. No melena. Sometimes mucous in the stool.     Past Medical History:   Diagnosis Date     Anxiety      Past Surgical History:   Procedure Laterality Date     BREAST BIOPSY, RT/LT Right 2008    times 2; fibroadenoma     BREAST SURGERY  2007,2013     GYN SURGERY  2002     OOPHORECTOMY Right 2002    complete Dr. Villalta     OOPHORECTOMY Left 2002    partial left  Dr. Villalta     Family History   Problem Relation Age of Onset     CANCER Paternal Grandmother      melanoma     Lipids Father      OSTEOPOROSIS Maternal Grandmother      CEREBROVASCULAR DISEASE Maternal Grandfather      Asthma No family hx of      C.A.D. 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     Social History Main Topics     Smoking status: Never Smoker     Smokeless tobacco: Never Used     Alcohol use 0.5 - 1.0 oz/week     Drug use:  "No     Sexual activity: Not Currently     Partners: Male     Birth control/ protection: Condom, None     Other Topics Concern      Service No     Blood Transfusions No     Caffeine Concern No     Occupational Exposure No     Hobby Hazards No     Sleep Concern No     Stress Concern Yes     needs alone time     Weight Concern No     Special Diet No     Back Care No     Exercise Yes     Bike Helmet Yes     Seat Belt Yes     Self-Exams Yes     Social History Narrative    How much exercise per week? Runs 3-4 times per week    How much calcium per day? Some, milk and yogurt       How much caffeine per day? 1-2 cups per day    How much vitamin D per day? None    Do you/your family wear seatbelts?  Yes    Do you/your family use safety helmets? Yes    Do you/your family use sunscreen? Yes    Do you/your family keep firearms in the home? No    Do you/your family have a smoke detector(s)? Yes        Do you feel safe in your home? Yes    Has anyone ever touched you in an unwanted manner? No     Explain          Maida 10[birth year] 2015 Estela Rodriguez LPN                    5/14/14        L[place] in [place]. Enjoys running and road biking.     Dating, heterosexual.     Megan Uriostegui MD               Medications and allergies reviewed by me today.       Review Of Systems    10 point ROS of systems including Constitutional, Eyes, Respiratory, Cardiovascular, Pulmonary, Gastroenterology, Genitourinary, Integumentary, Musculoskeletal, Psychiatric were all negative except for pertinent positives noted in my HPI.    OBJECTIVE:    BP (!) 133/92  Pulse 68  Ht 1.778 m (5' 10\")  Wt 87.2 kg (192 lb 4.8 oz)  BMI 27.59 kg/m2   Wt Readings from Last 1 Encounters:   03/09/18 87.2 kg (192 lb 4.8 oz)       General: Pleasant female, in NAD  ENT: oropharynx clear, MMM  Neck:  No LAD  Resp: lungs CAB  CV: Heart RRR, no MRG  Abd: Soft, NT, ND, nl bowel sounds, no HSM  Rectal: A couple of external hemorrhoids at 6:00, " possible anal fissure at 12:00; no internal masses, no blood on glove  Skin: warm, dry, no rash  Neuro: AOX3, no focal deficits     ASSESSMENT/PLAN:    Katty was seen today for consult.    Diagnoses and all orders for this visit:    External hemorrhoids  -Discussed nature of hemorrhoids, how to treat, how to prevent.   -Trial colace/miralax when more prone to constipation (travel, period, etc)  -Get plenty of water  -Fiber in diet  -Don't strain     Pt should return to clinic for f/u with me in PRN       Cheri Rios MD  03/09/18

## 2018-03-09 NOTE — PROGRESS NOTES
SUBJECTIVE:  Katty Mendoza is a 37 year old female with no significant pmh who comes in for blood in her stools. This happens about 25% of the time. It feels like it's around the time of her period. It is around the outside of her stool. It is a bright raspberry color. Will have blood on the toilet paper after wiping. It is not mixed in with the stool. It mostly happens when she has harder stools. She tries to drink enough water. Does also note more constipation with period. No abd pain. No melena. Sometimes mucous in the stool.     Past Medical History:   Diagnosis Date     Anxiety      Past Surgical History:   Procedure Laterality Date     BREAST BIOPSY, RT/LT Right 2008    times 2; fibroadenoma     BREAST SURGERY  2007,2013     GYN SURGERY  2002     OOPHORECTOMY Right 2002    complete Dr. Villalta     OOPHORECTOMY Left 2002    partial left  Dr. Villalta     Family History   Problem Relation Age of Onset     CANCER Paternal Grandmother      melanoma     Lipids Father      OSTEOPOROSIS Maternal Grandmother      CEREBROVASCULAR DISEASE Maternal Grandfather      Asthma No family hx of      C.A.D. 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     Social History Main Topics     Smoking status: Never Smoker     Smokeless tobacco: Never Used     Alcohol use 0.5 - 1.0 oz/week     Drug use: No     Sexual activity: Not Currently     Partners: Male     Birth control/ protection: Condom, None     Other Topics Concern      Service No     Blood Transfusions No     Caffeine Concern No     Occupational Exposure No     Hobby Hazards No     Sleep Concern No     Stress Concern Yes     needs  "alone time     Weight Concern No     Special Diet No     Back Care No     Exercise Yes     Bike Helmet Yes     Seat Belt Yes     Self-Exams Yes     Social History Narrative    How much exercise per week? Runs 3-4 times per week    How much calcium per day? Some, milk and yogurt       How much caffeine per day? 1-2 cups per day    How much vitamin D per day? None    Do you/your family wear seatbelts?  Yes    Do you/your family use safety helmets? Yes    Do you/your family use sunscreen? Yes    Do you/your family keep firearms in the home? No    Do you/your family have a smoke detector(s)? Yes        Do you feel safe in your home? Yes    Has anyone ever touched you in an unwanted manner? No     Explain          Maida 10, 2015 Estela Rodriguez LPBHARATH                    5/14/14        Lives in [facility] [place] [place] the [place]. Enjoys running and road biking.     Dating, heterosexual.     Megan Uriostegui MD               Medications and allergies reviewed by me today.       Review Of Systems    10 point ROS of systems including Constitutional, Eyes, Respiratory, Cardiovascular, Pulmonary, Gastroenterology, Genitourinary, Integumentary, Musculoskeletal, Psychiatric were all negative except for pertinent positives noted in my HPI.    OBJECTIVE:    BP (!) 133/92  Pulse 68  Ht 1.778 m (5' 10\")  Wt 87.2 kg (192 lb 4.8 oz)  BMI 27.59 kg/m2   Wt Readings from Last 1 Encounters:   03/09/18 87.2 kg (192 lb 4.8 oz)       General: Pleasant female, in NAD  ENT: oropharynx clear, MMM  Neck:  No LAD  Resp: lungs CAB  CV: Heart RRR, no MRG  Abd: Soft, NT, ND, nl bowel sounds, no HSM  Rectal: A couple of external hemorrhoids at 6:00, possible anal fissure at 12:00; no internal masses, no blood on glove  Skin: warm, dry, no rash  Neuro: AOX3, no focal deficits     ASSESSMENT/PLAN:    Katty was seen today for consult.    Diagnoses and all orders for this visit:    External hemorrhoids  -Discussed nature of hemorrhoids, how to treat, " how to prevent.   -Trial colace/miralax when more prone to constipation (travel, period, etc)  -Get plenty of water  -Fiber in diet  -Don't strain     Pt should return to clinic for f/u with me in PRN       Cheri Rios MD  03/09/18

## 2018-06-27 ENCOUNTER — OFFICE VISIT (OUTPATIENT)
Dept: OBGYN | Facility: CLINIC | Age: 38
End: 2018-06-27
Attending: OBSTETRICS & GYNECOLOGY
Payer: COMMERCIAL

## 2018-06-27 VITALS
HEIGHT: 70 IN | DIASTOLIC BLOOD PRESSURE: 75 MMHG | BODY MASS INDEX: 28 KG/M2 | SYSTOLIC BLOOD PRESSURE: 109 MMHG | WEIGHT: 195.6 LBS | HEART RATE: 102 BPM

## 2018-06-27 DIAGNOSIS — Z11.3 SCREENING EXAMINATION FOR VENEREAL DISEASE: ICD-10-CM

## 2018-06-27 DIAGNOSIS — Z13.1 SCREENING FOR DIABETES MELLITUS: ICD-10-CM

## 2018-06-27 DIAGNOSIS — Z13.21 ENCOUNTER FOR VITAMIN DEFICIENCY SCREENING: ICD-10-CM

## 2018-06-27 DIAGNOSIS — E55.9 VITAMIN D DEFICIENCY: ICD-10-CM

## 2018-06-27 DIAGNOSIS — Z13.220 SCREENING FOR LIPOID DISORDERS: ICD-10-CM

## 2018-06-27 DIAGNOSIS — Z00.00 VISIT FOR PREVENTIVE HEALTH EXAMINATION: Primary | ICD-10-CM

## 2018-06-27 PROCEDURE — G0145 SCR C/V CYTO,THINLAYER,RESCR: HCPCS | Performed by: STUDENT IN AN ORGANIZED HEALTH CARE EDUCATION/TRAINING PROGRAM

## 2018-06-27 PROCEDURE — 87591 N.GONORRHOEAE DNA AMP PROB: CPT | Performed by: STUDENT IN AN ORGANIZED HEALTH CARE EDUCATION/TRAINING PROGRAM

## 2018-06-27 PROCEDURE — 87491 CHLMYD TRACH DNA AMP PROBE: CPT | Performed by: STUDENT IN AN ORGANIZED HEALTH CARE EDUCATION/TRAINING PROGRAM

## 2018-06-27 PROCEDURE — 87624 HPV HI-RISK TYP POOLED RSLT: CPT | Performed by: STUDENT IN AN ORGANIZED HEALTH CARE EDUCATION/TRAINING PROGRAM

## 2018-06-27 NOTE — MR AVS SNAPSHOT
After Visit Summary   6/27/2018    Katty Mendoza    MRN: [id number]           Patient Information     Date Of Birth          1980        Visit Information        Provider Department      6/27/2018 8:00 AM Megan Uriostegui MD [facility]        Today's Diagnoses     Visit for preventive health examination    -  1    Vitamin D deficiency        Screening for diabetes mellitus        Screening for lipoid disorders        Screening examination for venereal disease        Encounter for vitamin deficiency screening          Care Instructions      PREVENTIVE HEALTH RECOMMENDATIONS:   Most women need a yearly breast and pelvic exam.    A PAP screen, a test done DURING a pelvic exam, is NO longer recommended yearly.    March 2013, screening guidelines recommended by ACOG for PAP screen are:    1) First pap at age 21.    2) Pap every 3 years until age 30.    3) After age 30, pap every 3 years or Pap with HR HPV screen every 5 years until age 65.  4) Women do NOT need a vaginal Pap screen after a hysterectomy (surgical removal of the uterus) when they have not had cancer.    Exceptions:  1) Yearly pap if HIV+ or immunosuppressed secondary to organ transplant  2) ELAINA II-III continue routine screening for 20 years.    I encourage you continue looking for opportunities to choose a healthy lifestyle:       * Choose to eat a heart healthy diet. Check out the FOOD PLATE guidelines at: http://www.choosemyplate.gov/ for helpful hints on weight and cholesterol management.  Balance your caloric intake with exercise to maintain a BMI in the 22 to 26 range. For bone health: Eat calcium-rich foods like yogurt, broccoli or take chewable calcium pills (500 to 600 mg) twice a day with food.       * Exercise for at least an average of 30 minutes a day, 5 days of the week. This will help you control your weight, release stress, and help prevent disease.      * Take a Vitamin D3 supplement daily fall  through spring and during summer unless you [id number]' full body sun exposure to skin without sunscreen.      * DO wear sunscreen to prevent skin cancer after the first 15-30 minutes.      * Identify stressors in your life, find ways to release the stress, and, make time for yourself. PLEASE ask for help if mood changes last longer than two weeks.     * Limit alcohol to one drink per day.  No smoking.  Avoid second hand smoke. If you smoke, ask for help to stop.       *  If you are in a sexual relationship, talk with your partner about possible infection risks and take action to protect yourself from exposure to a sexual infection.    Please request an infection screen for STIs (sexually transmitted infections) if you are less than age 26 OR believe that you may be at risk.     Get a flu shot each year. Get a tetanus shot every 10 years. EVERYONE needs a pertussis (Whooping cough) booster.    See your dentist twice a year for an exam and preventive care cleaning.     Consider the following screen tests:    1) cholesterol test every 5 years.     2) yearly mammogram after age 40 unless you have identified risks.    3) colonoscopy every 10 years after age 50 unless you have identified risks.    4) diabetes blood test screening if you are at risk for diabetes.      Additional information that you may also find helpful:  The Internet now gives us access to LOTS of information -- some of it helpful, research documented and also plenty of harmful, anecdotal information that may not pertain to your situtaion. Consider visiting the following websites for accurate health information:    www.vitamindcouncil.org/ : Info and ongoing research re Vitamin D    www.fairview.org : Up to date and easily searchable information on multiple topics.    www.medlineplus.gov : medication info, interactive tutorials, watch real surgeries online    www.cdc.gov : public health info, travel advisories, epidemics (H1N1)    www.jennifer/std.org:  current research re diagnosis, treatment and prevention of sexually contacted infections.    www.health.state.[place] : [place] dept of heat, public health issues in [place], N1N1    www.familydoctor.org : good info from the Academy of Family Physicians                Follow-ups after your visit        Follow-up notes from your care team     Return in 1 year (on 2019) for Preventative Health Visit, Physical Exam.      Future tests that were ordered for you today     Open Future Orders        Priority Expected Expires Ordered    Fasting Glucose [[id number]] Today  2019    Lipid panel reflex to direct LDL Fasting Routine  2019    25- OH-Vitamin D Routine  2019            Who to contact     Please call your clinic at [phone] to:    Ask questions about your health    Make or cancel appointments    Discuss your medicines    Learn about your test results    Speak to your doctor            Additional Information About Your Visit        MyChart Information     Infoharmoni is an electronic gateway that provides easy, online access to your medical records. With Infoharmoni, you can request a clinic appointment, read your test results, renew a prescription or communicate with your care team.     To sign up for Infoharmoni visit the website at [email or web address]   You will be asked to enter the access code listed below, as well as some personal information. Please follow the directions to create your username and password.     Your access code is: [email or web address]  Expires: 2018  6:30 AM     Your access code will  in 90 days. If you need help or a new code, please contact your [facility] or call [phone] for assistance.        Care EveryWhere ID     This is your Care EveryWhere ID. This could be used by other organizations to access your [place] medical records  [id number]        Your Vitals Were     Pulse Height Last Period BMI (Body Mass  "Index)          102 1.778 m (5' 10\") 06/13/2018 (Approximate) 28.07 kg/m2         Blood Pressure from Last 3 Encounters:   06/27/18 109/75   03/09/18 (!) 133/92   06/22/17 115/69    Weight from Last 3 Encounters:   06/27/18 88.7 kg (195 lb 9.6 oz)   03/09/18 87.2 kg (192 lb 4.8 oz)   06/22/17 88.7 kg (195 lb 8 oz)              We Performed the Following     Chlamydia trachomatis PCR Swab      HPV High Risk Types DNA Cervical     Neisseria gonorrhoeae PCR Swab [[id number]]     Pap imaged thin layer screen with HPV - recommended age 30 - 65 years (select HPV order below)        Primary Care Provider Fax #    Physician No Ref-Primary [phone]       No address on file        Equal Access to Services     KATJA JAMES : Shaq Morris, charu carrillo, shelley bailey, france guallpa . So [facility] [phone].    ATENCIÓN: Si habla español, tiene a terrell disposición servicios gratuitos de asistencia lingüística. Llame al [phone].    We comply with applicable federal civil rights laws and [place] laws. We do not discriminate on the basis of race, color, national origin, age, disability, sex, sexual orientation, or gender identity.            Thank you!     Thank you for choosing [facility]  for your care. Our goal is always to provide you with excellent care. Hearing back from our patients is one way we can continue to improve our services. Please take a few minutes to complete the written survey that you may receive in the mail after your visit with us. Thank you!             Your Updated Medication List - Protect others around you: Learn how to safely use, store and throw away your medicines at www.disposemymeds.org.          This list is accurate as of 6/27/18  4:30 PM.  Always use your most recent med list.                   Brand Name Dispense Instructions for use Diagnosis    cholecalciferol 1000 units capsule    vitamin  -D    360 capsule    " Take 4 capsules (4,000 Units) by mouth daily Take one capsule daily.    Vitamin D deficiency       OMEGA-3 FISH OIL PO           tretinoin 0.025 % cream    RETIN-A    45 g    Apply a pea sized amount nightly    Acne vulgaris

## 2018-06-27 NOTE — PATIENT INSTRUCTIONS

## 2018-06-27 NOTE — LETTER
2018       RE: Katty Mendoza  [facility] [facility] [address]  [place] [phone]     Dear Colleague,    Thank you for referring your patient, Katty Mendoza, to the [facility] at [facility]. Please see a copy of my visit note below.      Progress Note    SUBJECTIVE:  Katty Mendoza is an 37 year old, , who requests an Annual Preventive Exam.     Concerns today include:   - Desires STD testing and HPV testing for new low-risk sexual partner. Desires PAP smear.  - Hx of Vitamin D deficiency, currently not taking OTC VitD supplement  - Desires breast exam for hx of fibroadenoma removal.  - Not interested in contraception and currently not sexually active.    Menstrual History:  Menstrual History 2017   LAST MENSTRUAL PERIOD 2017 -   Menarche age - - -   Period Cycle (Days) - - 28   Period Duration (Days) - - 3   Method of Contraception - - None   Period Pattern - - Regular   Menstrual Flow - - Moderate   Menstrual Control - - -   Dysmenorrhea - - -   Reviewed Today - - Yes     History of abnormal Pap smear: HPV positive & NIL () --> HPV negative & NIL ()   Last    Lab Results   Component Value Date    PAP NIL 2017     Last   Lab Results   Component Value Date    HPV16 Negative 2017     Last   Lab Results   Component Value Date    HPV18 Negative 2017     Last   Lab Results   Component Value Date    HRHPV Negative 2017       Mammogram current: not applicable  Last Mammogram:   No results found.     Last Colonoscopy:  No results found for this or any previous visit.      HISTORY:    Current Outpatient Prescriptions on File Prior to Visit:  cholecalciferol (VITAMIN  -D) 1000 UNITS capsule Take 4 capsules (4,000 Units) by mouth daily Take one capsule daily.   Omega-3 Fatty Acids (OMEGA-3 FISH OIL PO)    tretinoin (RETIN-A) 0.025 % cream Apply a pea sized amount nightly     No  current facility-administered medications on file prior to visit.   No Known Allergies  Immunization History   Administered Date(s) Administered     HPV 2007, 2007, 10/23/2008     HepB 2004, 2005     Influenza (IIV3) PF 2005, 2005, 10/23/2008     TD (ADULT, 7+) 1996, 2002, 2005     Tdap (Adacel,Boostrix) 2009       Obstetric History       T0      L0     SAB0   TAB0   Ectopic0   Multiple0   Live Births0      Past Medical History:   Diagnosis Date     Anxiety      Past Surgical History:   Procedure Laterality Date     BREAST BIOPSY, RT/LT Right 2008    times 2; fibroadenoma     BREAST SURGERY  ,     GYN SURGERY  2002     OOPHORECTOMY Right     complete Dr. Villalta     OOPHORECTOMY Left 2002    partial left  Dr. Villalta     Family History   Problem Relation Age of Onset     Cancer Paternal Grandmother      melanoma     Lipids Father      Osteoperosis Maternal Grandmother      Cerebrovascular Disease Maternal Grandfather      Asthma No family hx of      C.A.D. 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     Social History Main Topics     Smoking status: Never Smoker     Smokeless tobacco: Never Used     Alcohol use 0.5 - 1.0 oz/week     Drug use: No     Sexual activity: Not Currently     Partners: Male     Birth control/ protection: Condom, None     Other Topics Concern      Service No     Blood Transfusions No     Caffeine Concern No     Occupational Exposure No     Hobby Hazards No     Sleep Concern No     Stress Concern Yes     needs alone time     Weight Concern No     Special Diet No     Back Care No      "Exercise Yes     Bike Helmet Yes     Seat Belt Yes     Self-Exams Yes     Social History Narrative    How much exercise per week? Runs 3-4 times per week    How much calcium per day? Some, milk and yogurt       How much caffeine per day? 1-2 cups per day    How much vitamin D per day? None    Do you/your family wear seatbelts?  Yes    Do you/your family use safety helmets? Yes    Do you/your family use sunscreen? Yes    Do you/your family keep firearms in the home? No    Do you/your family have a smoke detector(s)? Yes        Do you feel safe in your home? Yes    Has anyone ever touched you in an unwanted manner? No     Explain          Maida 10, 2015 Estela Rodriguez LPN                    5/14/14        Lives in [place] the [place]. Enjoys running and road biking.     Dating, heterosexual.     Megan Uriostegui MD               ROS  [unfilled]  PHQ-9 SCORE 6/10/2015   Total Score 2     AGUSTIN-7 SCORE 6/15/2016   Total Score 4 (minimal anxiety)         EXAM:  Blood pressure 109/75, pulse 102, height 1.778 m (5' 10\"), weight 88.7 kg (195 lb 9.6 oz), last menstrual period 06/13/2018, not currently breastfeeding. Body mass index is 28.07 kg/(m^2).  General - pleasant female in no acute distress.  Skin - no suspicious lesions or rashes  EENT-  PERRLA, euthyroid with out palpable nodules  Neck - supple without lymphadenopathy.  Lungs - clear to auscultation bilaterally.  Heart - regular rate and rhythm without murmur.  Abdomen - soft, nontender, nondistended, no masses or organomegaly noted.  Musculoskeletal - no gross deformities.  Neurological - normal strength, sensation, and mental status.    Breast Exam:  Breast: Without visible skin changes. No dimpling or lesions seen.   Breasts supple, non-tender with palpation, no dominant mass, nodularity, or nipple discharge noted bilaterally. Axillary nodes negative.      Pelvic Exam:  EG/BUS: Normal genital architecture without lesions, erythema or abnormal " secretions Bartholin's, Urethra, Nyssa's normal   Urethral meatus: normal   Urethra: no masses, tenderness, or scarring   Bladder: no masses or tenderness   Vagina: moist, pink, rugae with creamy, white and odorless  secretions  Cervix: no lesions  Uterus: anteverted,   Adnexa: Within normal limits and No masses, nodularity, tenderness  Rectum:anus normal       ASSESSMENT:  Encounter Diagnoses   Name Primary?     Vitamin D deficiency      Visit for preventive health examination Yes     Screening for diabetes mellitus      Screening for lipoid disorders      Screening examination for venereal disease      Encounter for vitamin deficiency screening       PLAN:   Orders Placed This Encounter   Procedures     Fasting Glucose [[id number]]     Lipid panel reflex to direct LDL Fasting     25- OH-Vitamin D     Pap imaged thin layer screen with HPV - recommended age 30 - 65 years (select HPV order below)     HPV High Risk Types DNA Cervical         Additional teaching done at this visit regarding calcium (1200 mg per day), OTC vitamin D supplement especially during the winter months, and weight/diet.    Return to clinic in one year.  Follow-up as needed.    Natali Hurst MD  [facility] PGY-1  6/27/2018    I have seen and examined the patient with the resident. I have reviewed, edited, and agree with the note.   My findings are:cervix nulliparous and without lesions.   Megan Uriostegui MD

## 2018-06-27 NOTE — PROGRESS NOTES
Progress Note    SUBJECTIVE:  Katty Mendoza is an 37 year old, , who requests an Annual Preventive Exam.     Concerns today include:   - Desires STD testing and HPV testing for new low-risk sexual partner. Desires PAP smear.  - Hx of Vitamin D deficiency, currently not taking OTC VitD supplement  - Desires breast exam for hx of fibroadenoma removal.  - Not interested in contraception and currently not sexually active.    Menstrual History:  Menstrual History 2017   LAST MENSTRUAL PERIOD 2017 -   Menarche age - - -   Period Cycle (Days) - - 28   Period Duration (Days) - - 3   Method of Contraception - - None   Period Pattern - - Regular   Menstrual Flow - - Moderate   Menstrual Control - - -   Dysmenorrhea - - -   Reviewed Today - - Yes     History of abnormal Pap smear: HPV positive & NIL () --> HPV negative & NIL ()   Last    Lab Results   Component Value Date    PAP NIL 2017     Last   Lab Results   Component Value Date    HPV16 Negative 2017     Last   Lab Results   Component Value Date    HPV18 Negative 2017     Last   Lab Results   Component Value Date    HRHPV Negative 2017       Mammogram current: not applicable  Last Mammogram:   No results found.     Last Colonoscopy:  No results found for this or any previous visit.      HISTORY:    Current Outpatient Prescriptions on File Prior to Visit:  cholecalciferol (VITAMIN  -D) 1000 UNITS capsule Take 4 capsules (4,000 Units) by mouth daily Take one capsule daily.   Omega-3 Fatty Acids (OMEGA-3 FISH OIL PO)    tretinoin (RETIN-A) 0.025 % cream Apply a pea sized amount nightly     No current facility-administered medications on file prior to visit.   No Known Allergies  Immunization History   Administered Date(s) Administered     HPV 2007, 2007, 10/23/2008     HepB 2004, 2005     Influenza (IIV3) PF 2005, 2005, 10/23/2008     TD (ADULT, 7+)  1996, 2002, 2005     Tdap (Adacel,Boostrix) 2009       Obstetric History       T0      L0     SAB0   TAB0   Ectopic0   Multiple0   Live Births0      Past Medical History:   Diagnosis Date     Anxiety      Past Surgical History:   Procedure Laterality Date     BREAST BIOPSY, RT/LT Right 2008    times 2; fibroadenoma     BREAST SURGERY  ,2013     GYN SURGERY  2002     OOPHORECTOMY Right 2002    complete Dr. Villalta     OOPHORECTOMY Left 2002    partial left  Dr. Villalta     Family History   Problem Relation Age of Onset     Cancer Paternal Grandmother      melanoma     Lipids Father      Osteoperosis Maternal Grandmother      Cerebrovascular Disease Maternal Grandfather      Asthma No family hx of      C.A.D. 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     Social History Main Topics     Smoking status: Never Smoker     Smokeless tobacco: Never Used     Alcohol use 0.5 - 1.0 oz/week     Drug use: No     Sexual activity: Not Currently     Partners: Male     Birth control/ protection: Condom, None     Other Topics Concern      Service No     Blood Transfusions No     Caffeine Concern No     Occupational Exposure No     Hobby Hazards No     Sleep Concern No     Stress Concern Yes     needs alone time     Weight Concern No     Special Diet No     Back Care No     Exercise Yes     Bike Helmet Yes     Seat Belt Yes     Self-Exams Yes     Social History Narrative    How much exercise per week? Runs 3-4 times per week    How much calcium per day? Some, milk and yogurt       How much caffeine per day? 1-2 cups per day    How much vitamin D per day? None    Do you/your  "family wear seatbelts?  Yes    Do you/your family use safety helmets? Yes    Do you/your family use sunscreen? Yes    Do you/your family keep firearms in the home? No    Do you/your family have a smoke detector(s)? Yes        Do you feel safe in your home? Yes    Has anyone ever touched you in an unwanted manner? No     Explain          Maida 10, 2014 Estela Rodriguez LPN                    5/14/14        [place] in [place]. Enjoys running and road biking.     Dating, heterosexual.     Megan Uriostegui MD               ROS  [unfilled]  PHQ-9 SCORE 6/10/2015   Total Score 2     AGUSTIN-7 SCORE 6/15/2016   Total Score 4 (minimal anxiety)         EXAM:  Blood pressure 109/75, pulse 102, height 1.778 m (5' 10\"), weight 88.7 kg (195 lb 9.6 oz), last menstrual period 06/13/2018, not currently breastfeeding. Body mass index is 28.07 kg/(m^2).  General - pleasant female in no acute distress.  Skin - no suspicious lesions or rashes  EENT-  PERRLA, euthyroid with out palpable nodules  Neck - supple without lymphadenopathy.  Lungs - clear to auscultation bilaterally.  Heart - regular rate and rhythm without murmur.  Abdomen - soft, nontender, nondistended, no masses or organomegaly noted.  Musculoskeletal - no gross deformities.  Neurological - normal strength, sensation, and mental status.    Breast Exam:  Breast: Without visible skin changes. No dimpling or lesions seen.   Breasts supple, non-tender with palpation, no dominant mass, nodularity, or nipple discharge noted bilaterally. Axillary nodes negative.      Pelvic Exam:  EG/BUS: Normal genital architecture without lesions, erythema or abnormal secretions Bartholin's, Urethra, [place]'s normal   Urethral meatus: normal   Urethra: no masses, tenderness, or scarring   Bladder: no masses or tenderness   Vagina: moist, pink, rugae with creamy, white and odorless  secretions  Cervix: no lesions  Uterus: anteverted,   Adnexa: Within normal limits and No masses, " nodularity, tenderness  Rectum:anus normal       ASSESSMENT:  Encounter Diagnoses   Name Primary?     Vitamin D deficiency      Visit for preventive health examination Yes     Screening for diabetes mellitus      Screening for lipoid disorders      Screening examination for venereal disease      Encounter for vitamin deficiency screening         PLAN:       Orders Placed This Encounter   Procedures     Fasting Glucose [FFG5072]     Lipid panel reflex to direct LDL Fasting     25- OH-Vitamin D     Pap imaged thin layer screen with HPV - recommended age 30 - 65 years (select HPV order below)     HPV High Risk Types DNA Cervical         Additional teaching done at this visit regarding calcium (1200 mg per day), OTC vitamin D supplement especially during the winter months, and weight/diet.    Return to clinic in one year.  Follow-up as needed.    Natali Hurst MD  N OBGYN PGY-1  6/27/2018    I have seen and examined the patient with the resident. I have reviewed, edited, and agree with the note.   My findings are:cervix nulliparous and without lesions.   Megan Uriostegui MD

## 2018-06-28 LAB
C TRACH DNA SPEC QL NAA+PROBE: NEGATIVE
N GONORRHOEA DNA SPEC QL NAA+PROBE: NEGATIVE
SPECIMEN SOURCE: NORMAL
SPECIMEN SOURCE: NORMAL

## 2018-06-29 LAB
COPATH REPORT: NORMAL
PAP: NORMAL

## 2018-07-02 LAB
FINAL DIAGNOSIS: NORMAL
HPV HR 12 DNA CVX QL NAA+PROBE: NEGATIVE
HPV16 DNA SPEC QL NAA+PROBE: NEGATIVE
HPV18 DNA SPEC QL NAA+PROBE: NEGATIVE
SPECIMEN DESCRIPTION: NORMAL
SPECIMEN SOURCE CVX/VAG CYTO: NORMAL

## 2018-07-06 DIAGNOSIS — E55.9 VITAMIN D DEFICIENCY: ICD-10-CM

## 2018-07-06 DIAGNOSIS — Z13.21 ENCOUNTER FOR VITAMIN DEFICIENCY SCREENING: ICD-10-CM

## 2018-07-06 DIAGNOSIS — Z13.220 SCREENING FOR LIPOID DISORDERS: ICD-10-CM

## 2018-07-06 DIAGNOSIS — Z13.1 SCREENING FOR DIABETES MELLITUS: ICD-10-CM

## 2018-07-06 LAB
CHOLEST SERPL-MCNC: 138 MG/DL
DEPRECATED CALCIDIOL+CALCIFEROL SERPL-MC: 22 UG/L (ref 20–75)
GLUCOSE SERPL-MCNC: 87 MG/DL (ref 70–99)
HDLC SERPL-MCNC: 69 MG/DL
LDLC SERPL CALC-MCNC: 60 MG/DL
NONHDLC SERPL-MCNC: 69 MG/DL
TRIGL SERPL-MCNC: 46 MG/DL

## 2018-07-06 PROCEDURE — 82947 ASSAY GLUCOSE BLOOD QUANT: CPT | Performed by: STUDENT IN AN ORGANIZED HEALTH CARE EDUCATION/TRAINING PROGRAM

## 2018-07-06 PROCEDURE — 36415 COLL VENOUS BLD VENIPUNCTURE: CPT | Performed by: STUDENT IN AN ORGANIZED HEALTH CARE EDUCATION/TRAINING PROGRAM

## 2018-07-06 PROCEDURE — 82306 VITAMIN D 25 HYDROXY: CPT | Performed by: STUDENT IN AN ORGANIZED HEALTH CARE EDUCATION/TRAINING PROGRAM

## 2018-07-06 PROCEDURE — 80061 LIPID PANEL: CPT | Performed by: STUDENT IN AN ORGANIZED HEALTH CARE EDUCATION/TRAINING PROGRAM

## 2018-07-07 ENCOUNTER — TELEPHONE (OUTPATIENT)
Dept: OBGYN | Facility: CLINIC | Age: 38
End: 2018-07-07

## 2018-11-20 ENCOUNTER — OFFICE VISIT (OUTPATIENT)
Dept: DERMATOLOGY | Facility: CLINIC | Age: 38
End: 2018-11-20
Payer: COMMERCIAL

## 2018-11-20 ENCOUNTER — TELEPHONE (OUTPATIENT)
Dept: DERMATOLOGY | Facility: CLINIC | Age: 38
End: 2018-11-20

## 2018-11-20 DIAGNOSIS — L20.84 INTRINSIC ATOPIC DERMATITIS: ICD-10-CM

## 2018-11-20 DIAGNOSIS — L70.0 ACNE VULGARIS: Primary | ICD-10-CM

## 2018-11-20 RX ORDER — ADAPALENE 0.1 G/100G
CREAM TOPICAL
Qty: 45 G | Refills: 11 | Status: SHIPPED | OUTPATIENT
Start: 2018-11-20 | End: 2020-01-07

## 2018-11-20 RX ORDER — TACROLIMUS 1 MG/G
OINTMENT TOPICAL
Qty: 60 G | Refills: 11 | Status: SHIPPED | OUTPATIENT
Start: 2018-11-20 | End: 2019-03-15

## 2018-11-20 ASSESSMENT — PAIN SCALES - GENERAL: PAINLEVEL: NO PAIN (0)

## 2018-11-20 NOTE — MR AVS SNAPSHOT
After Visit Summary   11/20/2018    Katty Mendoza    MRN: [id number]           Patient Information     Date Of Birth          1980        Visit Information        Provider Department      11/20/2018 8:45 AM Tamie Carrillo MD [facility] D[facility]atology        Today's Diagnoses     Acne vulgaris    -  1    Intrinsic atopic dermatitis           Follow-ups after your visit        Who to contact     Please call your clinic at [phone] to:    Ask questions about your health    Make or cancel appointments    Discuss your medicines    Learn about your test results    Speak to your doctor            Additional Information About Your Visit        MyChart Information     [facility] gives you secure access to your electronic health record. If you see a primary care provider, you can also send messages to your care team and make appointments. If you have questions, please call your primary care clinic.  If you do not have a primary care provider, please call [phone] and they will assist you.      [facility] is an electronic gateway that provides easy, online access to your medical records. With [facility], you can request a clinic appointment, read your test results, renew a prescription or communicate with your care team.     To access your existing account, please contact your [facility] or call [phone] for assistance.        Care EveryWhere ID     This is your Care EveryWhere ID. This could be used by other organizations to access your [place] medical records  [id number]         Blood Pressure from Last 3 Encounters:   06/27/18 109/75   03/09/18 (!) 133/92   06/22/17 115/69    Weight from Last 3 Encounters:   06/27/18 88.7 kg (195 lb 9.6 oz)   03/09/18 87.2 kg (192 lb 4.8 oz)   06/22/17 88.7 kg (195 lb 8 oz)              Today, you had the following     No orders found for display         Today's Medication Changes          These changes are accurate as of 11/20/18   9:07 AM.  If you have any questions, ask your nurse or doctor.               Start taking these medicines.        Dose/Directions    adapalene 0.1 % cream   Commonly known as:  DIFFERIN   Used for:  Acne vulgaris   Started by:  Tamie Carrillo MD        At bedtime   Quantity:  45 g   Refills:  11       tacrolimus 0.1 % ointment   Commonly known as:  PROTOPIC   Used for:  Intrinsic atopic dermatitis   Started by:  Tamie Carrillo MD        Use twice daily for eyelid rash   Quantity:  60 g   Refills:  11            Where to get your medicines      These medications were sent to [facility] [address] [place] [address] AT [address] [address]    Hours:  24-hours Phone:  [phone]     adapalene 0.1 % cream    tacrolimus 0.1 % ointment                Primary Care Provider Fax #    Physician No Ref-Primary [phone]       No address on file        Equal Access to Services     [facility] Center[facility] : Hadii aad ku hadasho Sozohaibali, waaxda luqadaha, qaybta kaalmada adeegyada, waxay courtneyin james guallpa . So [facility] [place] [phone].    ATENCIÓN: Si habla español, tiene a terrell disposición servicios gratuitos de asistencia lingüística. Wood al [phone].    We comply with applicable federal civil rights laws and [place] laws. We do not discriminate on the basis of race, color, national origin, age, disability, sex, sexual orientation, or gender identity.            Thank you!     Thank you for choosing [facility] DERMATOLOGY  for your care. Our goal is always to provide you with excellent care. Hearing back from our patients is one way we can continue to improve our services. Please take a few minutes to complete the written survey that you may receive in the mail after your visit with us. Thank you!             Your Updated Medication List - Protect others around you: Learn how to safely use, store and throw away your medicines at  www.disposemymeds.org.          This list is accurate as of 11/20/18  9:07 AM.  Always use your most recent med list.                   Brand Name Dispense Instructions for use Diagnosis    adapalene 0.1 % cream    DIFFERIN    45 g    At bedtime    Acne vulgaris       cholecalciferol 1000 units capsule    vitamin  -D    360 capsule    Take 4 capsules (4,000 Units) by mouth daily Take one capsule daily.    Vitamin D deficiency       OMEGA-3 FISH OIL PO           tacrolimus 0.1 % ointment    PROTOPIC    60 g    Use twice daily for eyelid rash    Intrinsic atopic dermatitis       tretinoin 0.025 % cream    RETIN-A    45 g    Apply a pea sized amount nightly    Acne vulgaris

## 2018-11-20 NOTE — TELEPHONE ENCOUNTER
Prior Authorization Retail Medication Request    Medication/Dose:Tacrolimus (protopic)0.1%   ICD code (if different than what is on RX): Intrinsic atopic dermatitis  Previously Tried and Failed:    Rationale:      Insurance Name:  [employer]  Insurance ID:        Pharmacy Information (if different than what is on RX)  Name: [facility]   Phone:  [phone]

## 2018-11-20 NOTE — LETTER
11/20/2018       RE: Katty Mendoza  [address]  [place] [address]     Dear Colleague,    Thank you for referring your patient, Katty Mendoza, to the [facility] DERMATOLOGY at [facility]. Please see a copy of my visit note below.    [facility] Dermatology Note      Dermatology Problem List:  1.Acne Vulgaris  2. Atopic dermatitis  3. Keratosis pilaris    CC:   Chief Complaint   Patient presents with     Derm Problem     Katty is here for a skin check and to discuss acne.  States no concerning areas for her skin check.          Encounter Date: Nov 20, 2018    History of Present Illness:  Ms. Katty Mendoza is a 37 year old female who presents for evaluation of her acne, eyelid dermatitis, and a skin check.  She saw Dr Taylor and was prescribed tretinoin but it was too irritating.  She responded well to Differin cream in the past but Differin gel OTC was too irritating as well. She has an ongoing right eyelid dermatitis for which she has seen her eye doctor and had tried drops.  She has concerns about chronically using cortisone cream in this area.     Past Medical History:   Patient Active Problem List   Diagnosis     Vitamin D deficiency     Past Medical History:   Diagnosis Date     Anxiety      Past Surgical History:   Procedure Laterality Date     BREAST BIOPSY, RT/LT Right 2008    times 2; fibroadenoma     BREAST SURGERY  2007,2013     GYN SURGERY  2002     OOPHORECTOMY Right 2002    complete Dr. Villalta     OOPHORECTOMY Left 2002    partial left  Dr. Villalta       Social History:  Patient  reports that she has never smoked. She has never used smokeless tobacco. She reports that she drinks about 0.5 - 1.0 oz of alcohol per week  She reports that she does not use illicit drugs.    Family History:  Family History   Problem Relation Age of Onset     Cancer Paternal Grandmother      melanoma     Melanoma Paternal Grandmother      Lipids  Father      Osteoporosis Maternal Grandmother      Cerebrovascular Disease Maternal Grandfather      Asthma No family hx of      C.A.D. No family hx of      Colon Cancer No family hx of      Inflammatory Bowel Disease No family hx of        Medications:  Current Outpatient Prescriptions   Medication Sig Dispense Refill     adapalene (DIFFERIN) 0.1 % cream At bedtime 45 g 11     tacrolimus (PROTOPIC) 0.1 % ointment Use twice daily for eyelid rash 60 g 11     cholecalciferol (VITAMIN  -D) 1000 UNITS capsule Take 4 capsules (4,000 Units) by mouth daily Take one capsule daily. (Patient not taking: Reported on 11/20/2018) 360 capsule 1     Omega-3 Fatty Acids (OMEGA-3 FISH OIL PO)        tretinoin (RETIN-A) 0.025 % cream Apply a pea sized amount nightly (Patient not taking: Reported on 11/20/2018) 45 g 2     No Known Allergies          Physical exam:  Vitals: There were no vitals taken for this visit.  GEN: This is a well developed, well-nourished female in no acute distress, in a pleasant mood.    SKIN: Total skin excluding the undergarment areas was performed. The exam included the head/face, neck, both arms, chest, back, abdomen, both legs, digits and/or nails.   -There are superifical acneiform papules with intermixed open and closed comedones on the cheeks and chin.   -Multiple regular brown pigmented macules and papules are identified on the exam.  No atypia on dermoscopy.   -There are pink scaly patches and plaques on the right inner canthus.  -No other lesions of concern on areas examined.     Impression/Plan:  1. Multiple clinically benign nevi on the exam today.    No atypia      2. Acne vulgaris    Differin cream at bedtime      3. Eczematous dermatitis    Protopic 0.1% ointment bid    Discussed patch testing    Follow-up in 1 year, earlier for new or changing lesions.       Staff Involved:  Staff Only    Again, thank you for allowing me to participate in the care of your patient.      Sincerely,    Tamie  Trinh Carrillo MD

## 2018-11-26 NOTE — TELEPHONE ENCOUNTER
Central Prior Authorization Team   Phone: [phone]      PA Initiation    Medication:   Insurance Company: [employer] - Phone [phone] Fax [phone]  Pharm[facility]illing the Rx[facility] [address] [place] [address]  Filling Pharmacy Phone: [phone]  Filling Pharmacy Fax:    Start Date: 11/26/2018

## 2018-11-27 NOTE — TELEPHONE ENCOUNTER
Prior Authorization Approval    Authorization Effective Date: 11/23/2018  Authorization Expiration Date: 11/23/2019  Medication: Tacrolimus (protopic)0.1% - Approved  Approved Dose/Quantity:   Reference #:     Insurance Company: [employer] - Phone [phone] Fax [phone]  Expected CoPay:       CoPay Card Available:      Foundation Assistance Needed:    Which Pharmacy is filling the prescription (Not needed for infusion/clinic administered): [facility] [address] [place] [address] AT [facility]  Pharmacy Notified: Yes  Patient Notified: Yes

## 2019-03-15 ENCOUNTER — OFFICE VISIT (OUTPATIENT)
Dept: INTERNAL MEDICINE | Facility: CLINIC | Age: 39
End: 2019-03-15
Attending: INTERNAL MEDICINE
Payer: COMMERCIAL

## 2019-03-15 VITALS
DIASTOLIC BLOOD PRESSURE: 73 MMHG | HEART RATE: 74 BPM | HEIGHT: 70 IN | SYSTOLIC BLOOD PRESSURE: 119 MMHG | BODY MASS INDEX: 28.07 KG/M2

## 2019-03-15 DIAGNOSIS — K62.5 RECTAL BLEEDING: Primary | ICD-10-CM

## 2019-03-15 DIAGNOSIS — R53.83 FATIGUE, UNSPECIFIED TYPE: ICD-10-CM

## 2019-03-15 DIAGNOSIS — E55.9 VITAMIN D DEFICIENCY: ICD-10-CM

## 2019-03-15 LAB
BASOPHILS # BLD AUTO: 0 10E9/L (ref 0–0.2)
BASOPHILS NFR BLD AUTO: 0.3 %
DEPRECATED CALCIDIOL+CALCIFEROL SERPL-MC: 25 UG/L (ref 20–75)
DIFFERENTIAL METHOD BLD: NORMAL
EOSINOPHIL # BLD AUTO: 0.2 10E9/L (ref 0–0.7)
EOSINOPHIL NFR BLD AUTO: 4.2 %
ERYTHROCYTE [DISTWIDTH] IN BLOOD BY AUTOMATED COUNT: 13.6 % (ref 10–15)
FERRITIN SERPL-MCNC: 22 NG/ML (ref 12–150)
HCT VFR BLD AUTO: 41.5 % (ref 35–47)
HGB BLD-MCNC: 13.8 G/DL (ref 11.7–15.7)
IMM GRANULOCYTES # BLD: 0 10E9/L (ref 0–0.4)
IMM GRANULOCYTES NFR BLD: 0.2 %
IRON SATN MFR SERPL: 28 % (ref 15–46)
IRON SERPL-MCNC: 83 UG/DL (ref 35–180)
LYMPHOCYTES # BLD AUTO: 2.6 10E9/L (ref 0.8–5.3)
LYMPHOCYTES NFR BLD AUTO: 44.3 %
MCH RBC QN AUTO: 31.2 PG (ref 26.5–33)
MCHC RBC AUTO-ENTMCNC: 33.3 G/DL (ref 31.5–36.5)
MCV RBC AUTO: 94 FL (ref 78–100)
MONOCYTES # BLD AUTO: 0.4 10E9/L (ref 0–1.3)
MONOCYTES NFR BLD AUTO: 6.4 %
NEUTROPHILS # BLD AUTO: 2.6 10E9/L (ref 1.6–8.3)
NEUTROPHILS NFR BLD AUTO: 44.6 %
NRBC # BLD AUTO: 0 10*3/UL
NRBC BLD AUTO-RTO: 0 /100
PLATELET # BLD AUTO: 201 10E9/L (ref 150–450)
RBC # BLD AUTO: 4.43 10E12/L (ref 3.8–5.2)
TIBC SERPL-MCNC: 301 UG/DL (ref 240–430)
TSH SERPL DL<=0.005 MIU/L-ACNC: 2.51 MU/L (ref 0.4–4)
WBC # BLD AUTO: 5.8 10E9/L (ref 4–11)

## 2019-03-15 PROCEDURE — 82728 ASSAY OF FERRITIN: CPT | Performed by: INTERNAL MEDICINE

## 2019-03-15 PROCEDURE — 36415 COLL VENOUS BLD VENIPUNCTURE: CPT | Performed by: INTERNAL MEDICINE

## 2019-03-15 PROCEDURE — G0463 HOSPITAL OUTPT CLINIC VISIT: HCPCS | Mod: ZF

## 2019-03-15 PROCEDURE — 85025 COMPLETE CBC W/AUTO DIFF WBC: CPT | Performed by: INTERNAL MEDICINE

## 2019-03-15 PROCEDURE — 83540 ASSAY OF IRON: CPT | Performed by: INTERNAL MEDICINE

## 2019-03-15 PROCEDURE — 82306 VITAMIN D 25 HYDROXY: CPT | Performed by: INTERNAL MEDICINE

## 2019-03-15 PROCEDURE — 83550 IRON BINDING TEST: CPT | Performed by: INTERNAL MEDICINE

## 2019-03-15 PROCEDURE — 84443 ASSAY THYROID STIM HORMONE: CPT | Performed by: INTERNAL MEDICINE

## 2019-03-15 NOTE — LETTER
3/19/2019         Katty Mendoza   [address]  [place] [address]        Dear Ms. Mendoza:    Katty-Your labs are all relatively normal. Your vitamin D is on the low side, so continued supplementation would help.    Results for orders placed or performed in visit on 03/15/19   CBC with Platelets Differential   Result Value Ref Range    WBC 5.8 4.0 - 11.0 10e9/L    RBC Count 4.43 3.8 - 5.2 10e12/L    Hemoglobin 13.8 11.7 - 15.7 g/dL    Hematocrit 41.5 35.0 - 47.0 %    MCV 94 78 - 100 fl    MCH 31.2 26.5 - 33.0 pg    MCHC 33.3 31.5 - 36.5 g/dL    RDW 13.6 10.0 - 15.0 %    Platelet Count 201 150 - 450 10e9/L    Diff Method Automated Method     % Neutrophils 44.6 %    % Lymphocytes 44.3 %    % Monocytes 6.4 %    % Eosinophils 4.2 %    % Basophils 0.3 %    % Immature Granulocytes 0.2 %    Nucleated RBCs 0 0 /100    Absolute Neutrophil 2.6 1.6 - 8.3 10e9/L    Absolute Lymphocytes 2.6 0.8 - 5.3 10e9/L    Absolute Monocytes 0.4 0.0 - 1.3 10e9/L    Absolute Eosinophils 0.2 0.0 - 0.7 10e9/L    Absolute Basophils 0.0 0.0 - 0.2 10e9/L    Abs Immature Granulocytes 0.0 0 - 0.4 10e9/L    Absolute Nucleated RBC 0.0    Ferritin   Result Value Ref Range    Ferritin 22 12 - 150 ng/mL   Iron and Iron Binding Capacity   Result Value Ref Range    Iron 83 35 - 180 ug/dL    Iron Binding Cap 301 240 - 430 ug/dL    Iron Saturation Index 28 15 - 46 %   TSH - Reflex to FT4   Result Value Ref Range    TSH 2.51 0.40 - 4.00 mU/L   Vitamin D deficiency screening   Result Value Ref Range    Vitamin D Deficiency screening 25 20 - 75 ug/L         Please note that test explanations are brief and do not reflect all diagnostic uses.  If you have any questions or concerns, please call the clinic at [phone].      Sincerely,      Skylar Landry sent on behalf of  Cheri Bertrand MD

## 2019-03-15 NOTE — LETTER
"  RE: Katty Mendoza  [address]  [place] [address]     Dear Colleague,    Thank you for referring your patient, Katty Mendoza, to the [facility]  at [facility]. Please see a copy of my visit note below.                       SUBJECTIVE:  Katty Mendoza is a 38 year old female who comes in for f/u on blood in her stools. Feels blood is mixed in with the stool at times. Not always associated with straining. No melena. Always bright red. Denies fevers/chills. Has some fatigue, no MONTENEGRO.     Fam Hx: no colon cancer, no colon polyps, no IBD    Soc Hx: Job at  of [employer] increasing in hrs. 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     Medications and allergies reviewed by me today.     ROS:   Constitutional, HEENT, cardiovascular, pulmonary, gi and gu systems are negative, except as otherwise noted.    OBJECTIVE:    /73 (BP Location: Right arm, Patient Position: Chair)   Pulse 74   Ht 1.778 m (5' 10\")   Breastfeeding? No   BMI 28.07 kg/m      Wt Readings from Last 1 Encounters:   06/27/18 88.7 kg (195 lb 9.6 oz)     Gen: Pleasant female, in NAD  ENT: Oropharynx clear, MMM  Neck: No LAD  Resp: lungs CAB  CV: Heart RRR, no MRG  Abd: Soft, NT, ND, nl bowel sounds  Ext: WWP, no LE edema  Skin: Warm, dry, no rash  Neuro: AOx3, no focal deficits     ASSESSMENT/PLAN:    Katty was seen today for follow up. Given ongoing BRBPR, will get CBC, iron studies, and colonoscopy at this time.     Diagnoses and all orders for this visit:    Rectal bleeding  -     GASTROENTEROLOGY ADULT REF PROCEDURE ONLY  -     CBC with Platelets Differential  -     Ferritin  -     Iron and Iron Binding Capacity    Fatigue, unspecified type  -     TSH - Reflex to FT4    Vitamin D deficiency  -     Vitamin D deficiency screening       Pt should return to clinic for f/u with me in PRN     Cheri Rios MD  03/15/19  "

## 2019-03-15 NOTE — NURSING NOTE
Chief Complaint   Patient presents with     Follow Up     Follow up blood in stool       See SARATH Scott 3/15/2019

## 2019-04-15 ENCOUNTER — TELEPHONE (OUTPATIENT)
Dept: GASTROENTEROLOGY | Facility: CLINIC | Age: 39
End: 2019-04-15

## 2019-04-15 DIAGNOSIS — K62.5 RECTAL BLEEDING: Primary | ICD-10-CM

## 2019-04-15 NOTE — TELEPHONE ENCOUNTER
Patient scheduled for Colonoscopy    Indication for procedure. Rectal bleeding [K62.5]    Referring Provider. Cheri Rios MD     ? No     Arrival time verified? 6:30 AM    Facility location verified? [address] [place] [address]    Instructions given regarding prep and procedure    Prep Type Golytely    Are you taking any anticoagulants or blood thinners? No    Instructions given? N/a     Electronic implanted devices? Denies     Pre procedure teaching completed? Yes    Transportation from procedure?  policy reviewed. Instructed patient to have someone stay with her for 6 hours post exam    H&P / Pre op physical completed? N/a

## 2019-04-21 RX ORDER — LIDOCAINE 40 MG/G
CREAM TOPICAL
Status: CANCELLED | OUTPATIENT
Start: 2019-04-21

## 2019-04-21 RX ORDER — ONDANSETRON 2 MG/ML
4 INJECTION INTRAMUSCULAR; INTRAVENOUS
Status: CANCELLED | OUTPATIENT
Start: 2019-04-21

## 2019-04-22 ENCOUNTER — HOSPITAL ENCOUNTER (OUTPATIENT)
Facility: AMBULATORY SURGERY CENTER | Age: 39
End: 2019-04-22
Attending: INTERNAL MEDICINE
Payer: COMMERCIAL

## 2019-04-22 ENCOUNTER — PATIENT OUTREACH (OUTPATIENT)
Dept: GASTROENTEROLOGY | Facility: CLINIC | Age: 39
End: 2019-04-22

## 2019-04-22 VITALS
BODY MASS INDEX: 27.92 KG/M2 | DIASTOLIC BLOOD PRESSURE: 67 MMHG | OXYGEN SATURATION: 99 % | RESPIRATION RATE: 16 BRPM | HEIGHT: 70 IN | WEIGHT: 195 LBS | SYSTOLIC BLOOD PRESSURE: 113 MMHG | TEMPERATURE: 98 F | HEART RATE: 71 BPM

## 2019-04-22 DIAGNOSIS — K63.5 COLON POLYPOSIS: Primary | ICD-10-CM

## 2019-04-22 DIAGNOSIS — K62.5 RECTAL BLEEDING: ICD-10-CM

## 2019-04-22 LAB
COLONOSCOPY: NORMAL
HCG UR QL: NEGATIVE
INTERNAL QC OK POCT: YES

## 2019-04-22 RX ORDER — FLUMAZENIL 0.1 MG/ML
0.2 INJECTION, SOLUTION INTRAVENOUS
Status: CANCELLED | OUTPATIENT
Start: 2019-04-22 | End: 2019-04-22

## 2019-04-22 RX ORDER — ONDANSETRON 2 MG/ML
4 INJECTION INTRAMUSCULAR; INTRAVENOUS
Status: DISCONTINUED | OUTPATIENT
Start: 2019-04-22 | End: 2019-04-23 | Stop reason: HOSPADM

## 2019-04-22 RX ORDER — NALOXONE HYDROCHLORIDE 0.4 MG/ML
.1-.4 INJECTION, SOLUTION INTRAMUSCULAR; INTRAVENOUS; SUBCUTANEOUS
Status: CANCELLED | OUTPATIENT
Start: 2019-04-22 | End: 2019-04-23

## 2019-04-22 RX ORDER — FENTANYL CITRATE 50 UG/ML
INJECTION, SOLUTION INTRAMUSCULAR; INTRAVENOUS PRN
Status: DISCONTINUED | OUTPATIENT
Start: 2019-04-22 | End: 2019-04-22 | Stop reason: HOSPADM

## 2019-04-22 RX ORDER — SIMETHICONE
LIQUID (ML) MISCELLANEOUS PRN
Status: DISCONTINUED | OUTPATIENT
Start: 2019-04-22 | End: 2019-04-22 | Stop reason: HOSPADM

## 2019-04-22 RX ORDER — LIDOCAINE 40 MG/G
CREAM TOPICAL
Status: DISCONTINUED | OUTPATIENT
Start: 2019-04-22 | End: 2019-04-23 | Stop reason: HOSPADM

## 2019-04-22 RX ORDER — ONDANSETRON 4 MG/1
4 TABLET, ORALLY DISINTEGRATING ORAL EVERY 6 HOURS PRN
Status: CANCELLED | OUTPATIENT
Start: 2019-04-22

## 2019-04-22 RX ORDER — ONDANSETRON 2 MG/ML
4 INJECTION INTRAMUSCULAR; INTRAVENOUS EVERY 6 HOURS PRN
Status: CANCELLED | OUTPATIENT
Start: 2019-04-22

## 2019-04-22 ASSESSMENT — MIFFLIN-ST. JEOR: SCORE: 1649.76

## 2019-04-22 NOTE — PROGRESS NOTES
[facility] RN Care Coordination Note:    Received a note from Dr. Guan for patient to be referred to genetics for evaluation for possible hereditary nonpolyposis colon cancer syndromes. Per message patient had colonoscopy with polyp removed with possible cancer and evaluation with genetic is needed to decide on further colonoscopy screening or if patient will benefit meeting with colorectal surgery to discuss possible colectomy pending any findings with genetics.     Referral placed and requested scheduling contact pt asap.     Nayeli Vega RN   Care Coordinator   [phone]

## 2019-04-23 LAB — COPATH REPORT: NORMAL

## 2019-04-24 NOTE — RESULT ENCOUNTER NOTE
I called the patient and reviewed her pathology results from her recent colonoscopy. Rectosigmoid polyp returned as a tubular adenoma with HGD. No cancer seen and negative margins. The other polyps were SSA. The transverse colon polyps were read out as hyperplastic polyps but given their location they may also be serrated adenoma which pathologically can be difficult to differentiate. She doesn't quite meet WHO criteria for serrated polyposis syndrome and her most advanced polyp was actually a tubular adenoma which would not fit with that syndrome either. Could potentially fit MUTYH associated polyposis. She has no family history of CRC. She is being referred to genetics for testing for high risk genetic syndromes given her young age.    A large sessile polyp remained in the ascending colon that requires advanced techniques to remove. Patient will be schedule in the OR for a repeat colonoscopy with EMR with me in 3 months. Future surveillance will depend on evaluation by genetics. Above all discussed with patient.    Nayeli, can you schedule the colonoscopy? Thanks    Juan Miguel Guan MD  [facility]  Division of Gastroenterology and Hepatology  [facility] [phone] [phone]

## 2019-04-29 ENCOUNTER — PATIENT OUTREACH (OUTPATIENT)
Dept: GASTROENTEROLOGY | Facility: CLINIC | Age: 39
End: 2019-04-29

## 2019-04-29 DIAGNOSIS — K63.5 COLON POLYPOSIS: Primary | ICD-10-CM

## 2019-04-29 NOTE — PROGRESS NOTES
[facility] RN Care Coordination Note:    Procedure requested: Colonoscopy with EMR    Requesting provider: Dr. Guan     Indication: Colon polyps     Urgency: 3 month follow up, due end of 07/2019.     Pre procedure testing needed: Pre op H&P     PAC appointment: Not needed     Other notes:     04/29/2019 1:57 PM -Received a note from Dr. Guan that patient needs a 3 month follow up colonoscopy procedure. Called and informed her that we will be contacting her to arrange for the procedure and she will need to see her PCP prior to the procedure for a pre op H&P for clearance. She verbalized understanding and will await call from scheduling.     Rx for prep medications sent to [facility] pharmacy listed in chart and prep letter routed to scheduling to mail to patient once scheduled.       Nayeli Vega RN   Care Coordinator   [phone]

## 2019-04-29 NOTE — Clinical Note
Prep medication was sent to her pharmacy, here is the letter for once its scheduled. :)     Thanks,  SHON

## 2019-04-29 NOTE — LETTER
"    April 29, 2019       TO: Katty Mendoza  [address]  [facility] [address]         Dear Katty,    You are scheduled to receive a colonoscopy under general anesthesia.       DATE: 7/18/19  ARRIVAL TIME: 5:30AM  PROCEDURE TIME: 7:30AM  PHYSICIAN: Dr. Juan Miguel Guan      Your procedure is taking place at   [facility], [address] [address]       *Go to Station 3C, 3rd floor of the Hospital*       **A pre-surgery nurse will call you to go over your medications, confirm details & answer questions 1-2 days prior to exam.**       It is an anesthesia requirement to have a PRE-OP PHYSICAL by your Primary Care Clinic before receiving GENERAL ANESTHESIA. Without it, your procedure will be delayed or cancelled. The physical can be faxed to [phone].       You can have milk products/solids until 8 hours prior to procedure. You can also have clear liquids until 4 hours prior to procedure. Make sure to let your Insurance Company know what you are having done. If they need anything from us, let me know. If you are a new patient to the [facility], make sure to call Registration 7-10 days prior to procedure at [phone].       You are scheduled to go home this same day and you will need someone to drive you home (no cabs or buses) and a \"24 hour \" around you after receiving General Anesthesia.  You and/or the Doctor(s) may want you to spend the night for observation, depending on your recovery process.       Please prepare for this possibility, just in case.         If you take blood thinning medication, you should get your DrDaxas approval to be OFF of COUMADIN/WARFARIN for 5 days (if it's switched to Lovenox, that should be held the night before and morning of procedure). .   DO NOT TAKE IBUPROFEN, ADVIL, ETC. for 7 days prior to procedure and until further notice after your procedure.  If you take CELEBREX and can HOLD it for 2 days prior to " exam, please do so.  Call the GI nurse if you have questions about this.     If you are diabetic, ask your doctor how much medicine, if any, you should take on the day of the exam. You may need to stop taking your medicine.       COLONOSCOPY PREPARATION INSTRUCTIONS - SPLIT DOSE       An absolutely clean colon is necessary for a successful examination. Please follow these instructions carefully.       Avoid fruits and vegetables with peels or leafy greens, including salad, as well as seeds and nuts for 2 - 3 days before the examination.       DAY BEFORE THE EXAMINATION:       NO SOLID FOOD CAN BE EATEN THE DAY BEFORE THE EXAM.       1. Mix preparation drink according to instructions and refrigerate.       2. Clear liquids are permitted: some examples include: clear broth, apple juice, vegetable/beef/chicken broth, ginger ale, or Jello-O. Do not drink any red, purple colored beverages as this can affect your exam. You should drink at least six 8 ounce glasses of clear liquids.       Evening before exam:       1. Begin drinking the preparation drink at 4:00 PM. Drink a large glass (about 8 ounces) every 15 minutes until half the container is empty, (about 8 or 9 glasses). Is is best to drink the whole glass rapidly rather than sipping small amounts continuously.       2. Feelings of bloating and/or nausea and chills are common after the first few glasses of the preparation drink because of the large volume of fluid ingested. This is temporary, however, and will disappear once bowel movements begin. Bowel movements usually occur within 4 hours after the first glass of the drink. They will continue periodically for approximately 2-4 hours after you finish drinking the last glass. By this time the stool liquid should be clear.           3. Between 8:00 PM - 10:00 PM finish the remaining half of the container. It is best to drink the whole glass rapidly rather than sipping small amounts continuously.       4. You may have  clear liquids in the morning, but NOTHING, INCLUDING WATER, CAN BE TAKEN BY MOUTH 8 HOURS PRIOR TO THE EXAMINATION.           Please contact Nayeli PERAZA, Care Coordinator, as soon as possible if you are unable to fully complete your bowel preparation or if your stools fail to turn clear; your procedure may need to be delayed and an alternative bowel preparation regimen may need to be utilized in order to provide you the best chance for a successful exam.           FOLLOW-UP, POST PROCEDURE:   Activity:   You should not drive, operate machinery, make important decisions, or do activities that require coordination or balance until the next day.   Abdominal Cramping:   It is normal to have mild cramping after the study.  This is due to air and water put into the intestines during the procedure.  Walking or turning side to side will usually help the air to pass.  Call if the pain is severe.         Sincerely,      Nayeli Vega

## 2019-05-01 ENCOUNTER — TELEPHONE (OUTPATIENT)
Dept: GASTROENTEROLOGY | Facility: CLINIC | Age: 39
End: 2019-05-01

## 2019-05-01 NOTE — TELEPHONE ENCOUNTER
Spoke to patient in regards to scheduled procedure. Informed patient she is scheduled with Dr. Guan on 7/18/19. Informed patient she will need an updated pre-op physical within 30 days of her procedure. Patient stated she is going to have this done locally with her PCP.Confirmed location of the procedure with the patient. Informed patient she will need a  and someone to monitor her for 24 hours after the procedure. Informed patient all scheduling details [facility].     5/1/19 1055am

## 2019-05-09 ENCOUNTER — ONCOLOGY VISIT (OUTPATIENT)
Dept: ONCOLOGY | Facility: CLINIC | Age: 39
End: 2019-05-09
Attending: INTERNAL MEDICINE
Payer: COMMERCIAL

## 2019-05-09 DIAGNOSIS — K63.5 COLON POLYPOSIS: Primary | ICD-10-CM

## 2019-05-09 LAB — MISCELLANEOUS TEST: NORMAL

## 2019-05-09 PROCEDURE — 36415 COLL VENOUS BLD VENIPUNCTURE: CPT

## 2019-05-09 NOTE — LETTER
Cancer Risk Management  Program Locations    [facility] Cancer [facility] Cancer [facility] Cancer [facility] Cancer [facility]  Mailing Address  Cancer Risk Management Program  [facility]  [address]  [facility] [address]  [place] [address]    New patient appointments  [phone]  May 10, 2019    Katty Mendoza  [address]   [place] [address]      Dear Katty,    It was a pleasure meeting with you at the [facility] on 5/9/2019.  Here is a copy of the progress note from your recent genetic counseling visit to the Cancer Risk Management Program.  If you have any additional questions, please feel free to call.    Referring Provider: Juan Miguel Guan MD    Presenting Information:   I met with Katty Kelly today for genetic counseling at the Cancer Risk Management Program at the [facility] due to her history of colon polyps.  She is here today to review this history and available genetic testing options.    Personal History:  Katty is a 38 year old female. Her first colonoscopy from 4/22/2019 was significant for 7 colon polyps.  This includes one 30mm sessile serrated adenoma of the ascending colon, one 20mm sessile serrated adenoma of the hepatic flexure, four 2-10mm hyperplastic polyps of the descending and transverse colon, and one 30mm recto-sigmoid tubular adenoma with high grade dysplasia.  She has a follow up colonoscopy scheduled for 7/18/2019 with Dr. Guan due to incomplete polyp resection, in which genetic test results have been requested.        She had her first menstrual period at age 15 and does not have children.  She reports a surgery to remove her right ovary at age 21 due to a large ovarian cyst.  She reports two benign breast biopsies (fibroadenomas).  She sees a dermatologist regularly and has a history of several benign moles  removed.      Family History: (Please see scanned pedigree for detailed family history information)    Katty's parents have both had normal colonoscopy screening.      Katty reports no maternal family history of cancer    Her paternal grandmother had a history of melanoma at age 60.      Her maternal ethnicity is Ugandan/Czech. Her paternal ethnicity is Romansh/Scottish.  There is no known Ashkenazi Synagogue ancestry on either side of her family.     Discussion:    Katty's personal history of colon polyps is suggestive of a possible hereditary cancer/polyposis syndrome.    We reviewed the features of sporadic, familial, and hereditary cancers. We discussed that Katty's family history does not present with features typically seen in families who have a hereditary cancer syndrome (such as early onset cancers, several relatives with same/related cancers or tumors, etc.).  However, her personal history of colon polyps may be suggestive of an underlying hereditary cancer syndrome, such as MUTYH-associated polyposis (MAP).  Based on her personal history of serrated polyps (including two sessile serrated adenomas larger than 10mm) it would be reasonable for Katty to consider genetic testing for the MUTYH gene.      We discussed the natural history and genetics of MUTYH-Associated Polyposis (MAP) caused by two mutations in the MUTYH gene (one mutation inherited from each parent).  Individuals with MAP can have hundreds to thousands of colon polyps, although most have fewer than 100 adenomatous polyps.  Individuals with MAP have up to an 80% risk of developing colon cancer by age 70 if not treated.  MAP is also associated with an increased risk of duodenal cancer.  Several other benign growths have been reported in MAP.      We discussed that there are additional genes that could cause increased risk for colon polyps and colorectal cancer. As many of these genes present with overlapping features in a family and accurate cancer  risk cannot always be established based upon the pedigree analysis alone, it would be reasonable for Katty to consider panel genetic testing to analyze multiple genes at once.    A detailed handout regarding hereditary colon cancer/polyposis and the information we discussed was provided to Katty at the end of our appointment today and can be found in the after visit summary. Topics included: inheritance pattern, cancer risks, cancer screening recommendations, and also risks, benefits and limitations of testing.    We reviewed that Katty's colon polyp history may also be suggestive of Serrated Polyposis Syndrome per current NCCN guidelines.  Currently Serrated Polyposis Syndrome is a clinical diagnosis based on personal colon polyp history; there are currently no known genes associated with this syndrome, though this may change in the future.  There are no estimates regarding the lifetime risks for colon cancer associated with this syndrome, however it is likely elevated. It is currently recommended that individuals with this diagnosis have colonoscopies every 1-3 years.  A diagnosis of Serrated Polyposis Syndrome may also change the colonoscopy screening recommendations for first degree relatives; per NCCN guidelines, first degree relatives (parents, siblings, children) should begin colonoscopies at age 40 and repeated every 5 years (or sooner) depending on the results of the exam.  This should be discussed further with her gastroenterologist should there be concern for SPS.      Genetic testing is available for 20 genes associated with increased risk for colon cancer and polyps: CustomNext-Cancer (APC, AXIN2, BMPR1A, CDH1, CHEK2, GREM1, EPCAM, MLH1, MSH2, MSH3, MSH6, MUTYH, NTHL1, PMS2, POLD1, POLE, PTEN, SMAD4, STK11, and TP53). This custom panel is a combination of the ColoNext panel and the AXIN2, MSH3, and NTHL1 genes.  We discussed that some of the genes in this panel are associated with specific hereditary  cancer syndromes and have published management guidelines: Quinones syndrome (MLH1, MSH2, MSH6, PMS2, EPCAM), Familial Adenomatous Polyposis (APC), MUTYH Associated Polyposis (MUTYH), Juvenile Polyposis syndrome (SMAD4, BMPR1A), Peutz-Jeghers syndrome (STK11), Cowden syndrome (PTEN), Li Fraumeni syndrome (TP53), and Hereditary Diffuse Gastric Cancer (CDH1).   The AXIN2, CHEK2, GREM1, MSH3, NTHL1, POLD1, and POLE genes have also been associated with increased colon cancer risk and have published management guidelines.  Consent was obtained and genetic testing for CustomNext-Cancer was sent to [facility]. Turn around time: 4 weeks.    Medical Management: For Katty, we reviewed that the information from genetic testing may determine:    additional cancer screening for which Katty may qualify (i.e. mammogram and breast MRI, more frequent colonoscopies, more frequent dermatologic exams, etc.),    And options for risk reducing surgeries Katty could consider, and/or treatment options for her polyp history      These recommendations will be discussed in detail once genetic testing is completed.     Plan:  1) Today Katty elected to proceed with the CustomNext-Cancer (ColoNext with AXIN2, MSH3, NTHL1) gene panel ordered through [facility].  2) This information should be available in 4 weeks.  3) Katty will schedule a return visit in 4-5 weeks to discuss the results.    Fatmata Stone MS, [facility]  Licensed Genetic Counselor  [phone]

## 2019-05-09 NOTE — PROGRESS NOTES
5/9/2019    Referring Provider: Juan Miguel Guan MD    Presenting Information:   I met with Katty Mendoza today for genetic counseling at the Cancer Risk Management Program at the [facility] due to her history of colon polyps.  She is here today to review this history and available genetic testing options.    Personal History:  Katty is a 38 year old female. Her first colonoscopy from 4/22/2019 was significant for 7 colon polyps.  This includes one 30mm sessile serrated adenoma of the ascending colon, one 20mm sessile serrated adenoma of the hepatic flexure, four 2-10mm hyperplastic polyps of the descending and transverse colon, and one 30mm recto-sigmoid tubular adenoma with high grade dysplasia.  She has a follow up colonoscopy scheduled for 7/18/2019 with Dr. Guan due to incomplete polyp resection, in which genetic test results have been requested.        She had her first menstrual period at age 15 and does not have children.  She reports a surgery to remove her right ovary at age 21 due to a large ovarian cyst.  She reports two benign breast biopsies (fibroadenomas).  She sees a dermatologist regularly and has a history of several benign moles removed.      Family History: (Please see scanned pedigree for detailed family history information)    Katty's parents have both had normal colonoscopy screening.      Katty reports no maternal family history of cancer    Her paternal grandmother had a history of melanoma at age 60.      Her maternal ethnicity is Cristina/Divehi. Her paternal ethnicity is [place]araguan/Brazilian.  There is no known Ashkenazi Confucianism ancestry on either side of her family.     Discussion:    Katty's personal history of colon polyps is suggestive of a possible hereditary cancer/polyposis syndrome.    We reviewed the features of sporadic, familial, and hereditary cancers. We discussed that Katty's family history does not present with features typically seen in families who have a hereditary  cancer syndrome (such as early onset cancers, several relatives with same/related cancers or tumors, etc.).  However, her personal history of colon polyps may be suggestive of an underlying hereditary cancer syndrome, such as MUTYH-associated polyposis (MAP).  Based on her personal history of serrated polyps (including two sessile serrated adenomas larger than 10mm) it would be reasonable for Katty to consider genetic testing for the MUTYH gene.      We discussed the natural history and genetics of MUTYH-Associated Polyposis (MAP) caused by two mutations in the MUTYH gene (one mutation inherited from each parent).  Individuals with MAP can have hundreds to thousands of colon polyps, although most have fewer than 100 adenomatous polyps.  Individuals with MAP have up to an 80% risk of developing colon cancer by age 70 if not treated.  MAP is also associated with an increased risk of duodenal cancer.  Several other benign growths have been reported in MAP.      We discussed that there are additional genes that could cause increased risk for colon polyps and colorectal cancer. As many of these genes present with overlapping features in a family and accurate cancer risk cannot always be established based upon the pedigree analysis alone, it would be reasonable for Katty to consider panel genetic testing to analyze multiple genes at once.    A detailed handout regarding hereditary colon cancer/polyposis and the information we discussed was provided to Katty at the end of our appointment today and can be found in the after visit summary. Topics included: inheritance pattern, cancer risks, cancer screening recommendations, and also risks, benefits and limitations of testing.    We reviewed that Katty's colon polyp history may also be suggestive of Serrated Polyposis Syndrome per current NCCN guidelines.  Currently Serrated Polyposis Syndrome is a clinical diagnosis based on personal colon polyp history; there are currently  no known genes associated with this syndrome, though this may change in the future.  There are no estimates regarding the lifetime risks for colon cancer associated with this syndrome, however it is likely elevated. It is currently recommended that individuals with this diagnosis have colonoscopies every 1-3 years.  A diagnosis of Serrated Polyposis Syndrome may also change the colonoscopy screening recommendations for first degree relatives; per NCCN guidelines, first degree relatives (parents, siblings, children) should begin colonoscopies at age 40 and repeated every 5 years (or sooner) depending on the results of the exam.  This should be discussed further with her gastroenterologist should there be concern for SPS.      Genetic testing is available for 20 genes associated with increased risk for colon cancer and polyps: CustomNext-Cancer (APC, AXIN2, BMPR1A, CDH1, CHEK2, GREM1, EPCAM, MLH1, MSH2, MSH3, MSH6, MUTYH, NTHL1, PMS2, POLD1, POLE, PTEN, SMAD4, STK11, and TP53). This custom panel is a combination of the ColoNext panel and the AXIN2, MSH3, and NTHL1 genes.  We discussed that some of the genes in this panel are associated with specific hereditary cancer syndromes and have published management guidelines: Quinones syndrome (MLH1, MSH2, MSH6, PMS2, EPCAM), Familial Adenomatous Polyposis (APC), MUTYH Associated Polyposis (MUTYH), Juvenile Polyposis syndrome (SMAD4, BMPR1A), Peutz-Jeghers syndrome (STK11), Cowden syndrome (PTEN), Li Fraumeni syndrome (TP53), and Hereditary Diffuse Gastric Cancer (CDH1).   The AXIN2, CHEK2, GREM1, MSH3, NTHL1, POLD1, and POLE genes have also been associated with increased colon cancer risk and have published management guidelines.  Consent was obtained and genetic testing for CustomNext-Cancer was sent to [facility]. Turn around time: 4 weeks.    Medical Management: For Katty, we reviewed that the information from genetic testing may determine:    additional  cancer screening for which Katty may qualify (i.e. mammogram and breast MRI, more frequent colonoscopies, more frequent dermatologic exams, etc.),    And options for risk reducing surgeries Katty could consider, and/or treatment options for her polyp history      These recommendations will be discussed in detail once genetic testing is completed.     Plan:  1) Today Katty elected to proceed with the CustomNext-Cancer (ColoNext with AXIN2, MSH3, NTHL1) gene panel ordered through [facility].  2) This information should be available in 4 weeks.  3) Katty will schedule a return visit in 4-5 weeks to discuss the results.    Face to face time: 45 minutes    Fatmata Stone MS, [facility]  Licensed Genetic Counselor  [phone]

## 2019-05-09 NOTE — PATIENT INSTRUCTIONS
Assessing Cancer Risk  Only about 5-10% of cancers are thought to be due to an inherited cancer susceptibility gene.    These families often have:  ? Several people with the same or related types of cancer  ? Cancers diagnosed at a young age (before age 50)  ? Individuals with more than one primary cancer  ? Multiple generations of the family affected with cancer    Comprehensive Colon Cancer Panel  We each inherit two copies of every gene in our bodies: one from our mother, and one from our father.  Each gene has a specific job to do.  When a gene has a mistake or  mutation  in it, it does not work like it should.      This handout will review common hereditary colon cancer syndromes, and other genes related to an increased risk for colon cancer.  The genes that will be discussed in this handout are: APC, BMPR1A, CDH1, CHEK2, EPCAM, GREM1, MLH1, MSH2, MSH6, MUTYH, PMS2, POLD1, POLE, PTEN, SMAD4, STK11, and TP53.  These genes are clinically actionable, meaning there are published guidelines for cancer screening and management for individuals who are found to carry mutations in these genes. Inheriting a mutation does not mean a person will develop cancer, but it does significantly increase his or her risk above the general population risk.      Familial Adenomatous Polyposis (FAP)  FAP is a hereditary cancer syndrome caused by mutations in the APC gene. The condition is known to cause hundreds to thousands of adenomatous polyps in the colon, creating a  carpet  of polyps. Some individuals have what is called attenuated FAP (AFAP), a milder form of FAP with fewer polyps and typically later onset. Individuals with an APC gene mutation are at an increased risk for colon, thyroid, and duodenal cancers, as well as several other types of cancer1.  Other features of this condition may include: osteomas, dental anomalies, benign skin lesions, CHRPE ( freckle  on the inside of the eye), and desmoid tumors.      Lifetime  Cancer Risks     Cancer Type General Population FAP   Colon  5% near 100%   Thyroid (papillary) 1% 1-12%   Duodenal <1% 5%   Liver  <1% 1-2% before age 5   Pancreas <1% 1%   Stomach <1% 1%       Juvenile Polyposis Syndrome (JPS)  JPS is characterized by hamartomatous polyps, called juvenile polyps, in the gastrointestinal tract.  Juvenile  refers to the type of polyps seen in this hereditary cancer condition, not the age of onset. Currently, mutations in two genes are known to cause JPS: BMPR1A and SMAD4. Of individuals clinically diagnosed with JPS, only 40% have an identifiable mutation in one of these genes, suggesting there are other genes that cause JPS that have not been discovered yet. Individuals with JPS are at an increased risk for colon cancer and stomach cancer 2,3,4. Pancreatic and small bowel cancers have also been reported in JPS, but the actual risks are unknown.         Lifetime Cancer Risks    Cancer Type General Population JPS   Colon 5% 40-50%   Gastric/Duodenal <1% 10-21%     Some individuals with SMAD4 mutations have a condition called JPS/HHT (Juvenile Polyposis/Hereditary Hemorrhagic Telangiectasia) where in addition to JPS, individuals may have nose bleeds and clotting issues.     Hereditary Diffuse Gastric Cancer (HDGC)  Currently, mutations in one gene are known to cause Hereditary Diffuse Gastric Cancer: CDH1.  Individuals with HDGC are at increased risk for diffuse gastric cancer and lobular breast cancer. Of people diagnosed with HDGC, 30-50% have a mutation in the CDH1 gene.  This suggests there are likely mutations in other genes that may cause HDGC that have not been identified yet. Individuals with HDGC may also be at increased risk for colon cancer.      Lifetime Cancer Risks    Cancer Type General Population HDGC   Diffuse Gastric <1% 67-83%   Breast 12% 39-52%     Quinones syndrome  Mutations in five different genes are known to cause Quinones syndrome: MLH1, MSH2, MSH6, PMS2, and  EPCAM. Individuals with Quinones syndrome have an increased risk for colon, uterine, ovarian, small bowel, stomach, urinary tract, and brain cancer, as well as several other types of cancer. The exact lifetime cancer risks are dependent upon the gene in which the mutation was identified.      Lifetime Cancer Risks    Cancer Type General Population Quinones syndrome   Colon 5% 10-80%   Uterine 2-3% 15-60%   Stomach <1% 6-13%   Ovarian 2% 4-24%   Urinary tract <1% 1-7%   Hepatobiliary tract <1% 1-4%   Small bowel <1% 3-6%   Brain/CNS <1% 1-3%   Pancreas <1% 1-6%       MUTYH-Associated Polyposis (MAP)  MAP is a hereditary cancer syndrome caused by mutations in the MUTYH gene. Unlike the other hereditary cancer genes discussed in this handout, two mutations in the MUTYH gene cause MAP and increase cancer risk. Those affected with MAP typically have between  adenomatous polyps. This syndrome also increases the risk for colon and duodenal cancer. Current research suggests that other cancers may be associated with MUTYH mutations, as well. The table below includes the risk that someone with two MUTYH gene mutations would develop cancer in their lifetime; of note, there is also an increased colon cancer risk for individuals who carry only one MUTYH gene mutation5,6,7.       Lifetime Cancer Risks    Cancer Type General Population MAP   Colon 5% %   Duodenal  <1% 5%       Cowden syndrome  Cowden syndrome is a hereditary condition that increases the risk for breast, thyroid, endometrial, colon, and kidney cancer.  A single mutation in the PTEN gene causes Cowden syndrome and increases cancer risk.  The table below shows the chance that someone with a PTEN mutation would develop cancer in their lifetime8,9.  Other benign features seen in some individuals with Cowden syndrome include benign skin lesions (facial papules, keratoses, lipomas), learning disabilities, autism, thyroid nodules, hamartomatous colon polyps, and  larger head size.      Lifetime Cancer Risks   Cancer Type General Population Cowden Syndrome   Breast 12% 25-50%*   Thyroid 1% 35%   Renal 1-2% 35%   Endometrial 2-3% 28%   Colon 5% 9%   Melanoma 2-3% >5%     *One recent study found breast cancer risk to be increased to 85%    Peutz-Jeghers syndrome (PJS)  PJS is a hereditary cancer syndrome caused by mutations in the STK11 gene. This condition can be distinguished from other hereditary syndromes by the presence of hamartomatous polyps in the gastrointestinal tract and freckles present in unusual places such as the hands, feet, neck, and lips. Individuals with Peutz-Jeghers syndrome have an increased risk for colon, breast, pancreatic, and other cancers3.  Men are at risk for testicular tumors which can affect hormones in the body. Women are at risk for sex cord tumors of the ovaries and a rare aggressive type of cervical cancer.     Lifetime Cancer Risks    Cancer Type General Population PJS   Breast 12% 45-50%   Colon 5% 39%   Stomach <1% 29%   Pancreas 1.5% 11-36%   Small Intestine <1% 13%     Ovarian  2%  18%   Lung 6-7% 15-17%     Additional Genes Associated with Increased Colon Cancer Risk  CHEK2  CHEK2 is a moderate-risk breast cancer gene.  Women who have a mutation in CHEK2 have around a 2-fold increased risk for breast cancer compared to the general population, and this risk may be higher depending upon family history.12,13,14.  Mutations in CHEK2 have also been shown to increase the risk of a number of other cancers, including colon and prostate, however these cancer risks are currently not well understood.     GREM1  GREM1 is a moderate-risk colon polyposis gene. Duplications of this gene are more commonly found in individuals with Ashkenazi Religious [id number]. Mutations in GREM1 are associated with colon polyps and therefore an increased risk of colon cancer; however the estimated cancer risk is not well [id number].     POLD1 and POLE  POLD1 and POLE  are moderate-risk colon cancer genes. Carriers of a mutation in one of these genes increases the lifetime risk of colorectal [id number],18,19,20. Mutations in these genes may also be associated with increased risk for other cancers including: endometrial cancer, duodenal adenomas and carcinomas, and brain tumors.    TP53  Li Fraumeni syndrome (LFS) is a hereditary cancer predisposition syndrome. LFS is caused by a mutation in the TP53 gene. A single mutation in one of the copies of TP53 increases the risk for multiple cancers. Individuals with LFS are at an increased risk for developing cancer at a young age. The general lifetime risk for development of cancer is 50% by age 30 and 90% by age 60.      Core Cancers: Sarcomas, Breast, Brain, Lung, Leukemias/Lymphomas, Adrenocortical carcinomas  Other Cancers: Gastrointestinal, Thyroid, Skin, Genitourinary    Genetic Testing  Genetic testing involves a simple blood test and will look at the genetic information in genes associated with an increased risk of colon cancer. The tests look for any harmful mutations that are associated with increased cancer risk.  If possible, it is recommended that the person(s) who has had cancer be tested before other family members.  That person will give us the most useful information about whether or not a specific gene mutation is associated with the cancer in the family.     Results  There are three possible results from genetic testing:  ? Positive--a harmful mutation was identified  ? Negative--no mutation was identified  ? Variant of unknown significance--a variation in one of the genes was identified, but it is unclear how this impacts cancer risk in the family  Advantages and Disadvantages  There are advantages and disadvantages to genetic testing of these genes.    Advantages  ? May clarify your cancer risk  ? Can help you make medical decisions  ? May explain the cancers in your family  ? May give useful information to your family  members (if you share your results)    Disadvantages  ? Possible negative emotional impact of learning about inherited cancer risk  ? Uncertainty in interpreting a negative test result in some situations  ? Possible genetic discrimination concerns (see below)    Inheritance   Most mutations in the genes outlined above are inherited in an autosomal dominant pattern.  This means that if a parent has a mutation, each of his or her children will have a 50% chance of inheriting that same mutation.  Therefore, each child--male or female--would have a 50% chance of being at increased risk for developing cancer.                                            Image obtained from [employer], 2013     In the case of MUTYH-Associated Polyposis (MAP) this hereditary cancer syndrome is inherited in an autosomal recessive pattern. This means that each parent of an individual with MAP is a carrier of MAP, meaning that they have only one mutation in MUTYH. They still have one functioning copy of their gene.  Carriers are at a slightly higher risk for colon cancer than the general population. If each parent is a carrier for MAP, they have a 25% of having a child who is affected with MAP, meaning the child inherited both gene mutations - one from each parent.       Image obtained from [employer], 2016    Genetic Information Nondiscrimination Act (TIFFANIE)  TIFFANIE is a federal law that protects individuals from health insurance or employment discrimination based on a genetic test result alone.  Although rare, there are currently no legal protections in terms of life insurance, long term care, or disability insurances.  Visit the National Human Genome Research [place] at Genome.gov/17658431 to learn more.    Reducing Cancer Risk  Each of the genes listed within this handout have nationally recognized cancer screening guidelines that would be recommended for individuals who test positive.  In addition to increased  cancer screening, surgeries may be offered or recommended to reduce cancer risk in certain cases.  Recommendations are based upon an individual s genetic test result as well as their personal and family history of cancer.    Questions to Think About Regarding Genetic Testing  ? What effect will the test result have on me and my relationship with my family members if I have an inherited gene mutation?  If I don t have a gene mutation?  ? Should I share my test results, and how will my family react to this news, which may also affect them?  ? Are my children ready to learn new information that may one day affect their own health?    Resources    PTEN World [email or web address]   No Stomach for Cancer, Inc. [email or web address]   Stomach Cancer Relief Network scrnet.org   Collaborative Group of the Americas on Inherited Colorectal Cancer (CGA) cgaicc.com   Cancer Care cancercare.org   American Cancer Society (ACS) cancer.org   National Cancer [place] (NCI) cancer.org   Quinones Syndrome International [email or web address]       Please call us if you have any questions or concerns.     Cancer Risk Management Program [phone]-CANCER ([phone])  ? Patricia Gutierrez, MS, [facility]  [phone]  ? Keyana Hopkins, MS, [facility]  [phone]  ? Fatmata Stone, MS, [facility]  [phone]  ? Caesar Baker, MS, [facility]  [phone]  ? Kiarra Cain, MS, [facility] [phone]    References    1. Tika ASHTON, Shayla J, Milad G, Pollo-Celeste E, Yuli J, et al. The Prevalence of thyroid cancer and benign thyroid disease in patients with familial adenomatous polyposis may be higher than previously recognized. Clin Colorectal Cancer. 2012;11:304-308.  2. Maria Antonias L, Van Rachana A, Jasen L, payton-Doron C, Arnoldo K, et al. Risk of colorectal cancer in juvenile polyposis. Gut. 2007;56:965-967.  3. Artie FG, Davina MN, Presley CA. Colorectal cancer risk in hamartomatous polyposis syndromes. World Journal of Gastrointestinal Surgery. 2015;27:25-32  4. Amna MENESES. Guidance  on gastrointestinal surveillance for hereditary non-polyposis colorectal cancer, familial adenomatous polypolis, juvenile polyposis, and Peutz-Jeghers syndrome. Gut. 2002;51:21-27.  5. Ino AK, Randy JENIFFER, Hollie JG et al. Risk of extracolonic cancers for people with biallelic and monoallelic mutations in MUTYH. Int J of Cancer. 2016;139:2337-0677.  6. Nagi S, Jeronimo S, Stacy H, Yesenia K, Painting M, et al. MUTYH-associated polyposis: 70 of 71 patients with biallelic mutations present with an attenuated or atypical phenotype. Int J of Cancer. 2006;119:807-814.  7. Janet G, Zach F, Luke I, Carter M, Janet H, et al. MUTYH mutation carriers have increased breast cancer risk. Cancer. 2012;5261-1629.  8. Al MH, Felix J, Ranjan J, Mau LA, Ramona MS, Eng C. Lifetime cancer risks in individuals with germline PTEN mutations. Clin Cancer Res. 2012;18:400-7.  9. Tony R. Cowden Syndrome: A Critical Review of the Clinical Literature. J Felisa . 2009:18:13-27.  10. National Comprehensive Cancer Network. Clinical practice guidelines in oncology, colorectal cancer screening. Available online (registration required). 2013.  11. National Cancer [place]. SEER Cancer Stat Fact Sheets.  December 2013.  12. CHEK2 Breast Cancer Case-Control Consortium. CHEK2*1100delC and susceptibility to breast cancer: A collaborative analysis involving 10,860 breast cancer cases and 9,065 controls from 10 studies. Am J Hum Felisa, 74 (2004), pp. 6590-3898  13. Quiros T, No S, Rashid K, et al. Spectrum of Mutations in BRCA1, BRCA2, CHEK2, and TP53 in Families at High Risk of Breast Cancer. ELIZABET. 2006;295(12):9706-5236.  14. Nav KELLY, Keli D, Steven A, et al. Risk of breast cancer in women with a CHEK2 mutation with and without a family history of breast cancer. J Clin Oncol. 2011;29:1488-6386.  15. Duglas MAST, Jose ASHTON, Paul J, Hannah N, Kiki CELESTE et al. Defining the polyposis/colorectal cancer phenotype associated  with the GREM1 duplication: counseling and management guidelines. Felisa .Res. 2016;98:1-5.  16. Marvin ASHTON, Alma Rosa S, Reagan A, Geraldine Golden, et al. Hereditary mixed polyposis syndrome is caused by a 40kb upstream duplication that leads to increased and ectopic expression of the BMP antagonist GREM1. Ilene Felisa. 2015;44:699-703.  17. LETICIA Daley. et al. Germline mutations affecting the proofreading domains of POLE and POLD1 predispose to colorectal adenomas and carcinomas. Ilene. Felisa. 45, 136-44 (2013).  18. ZORAIDA Chávez. et al. POLE and POLD1 mutations in 529 giancarlo with familial colorectal cancer and/or polyposis: review of reported cases and recommendations for genetic testing and surveillance. Felisa. Med. (2015). doi:10.1038/gim.2015.75  19. DAVID Nolasco et al. New insights into POLE and POLD1 germline mutations in familial colorectal cancer and polyposis. Hum. Mol. Felisa. 23, 8040-12 (2014).  20. BLANCA Pino. et al. Frequency and phenotypic spectrum of germline mutations in POLE and seven other polymerase genes in 266 patients with colorectal adenomas and carcinomas. Int. J. Cancer 137, 320-31 (2015).

## 2019-05-09 NOTE — LETTER
5/9/2019         RE: Katty Mendoza  [address]  [facility] [address]        Dear Colleague,    Thank you for referring your patient, Katty Mendoza, to the CANCER RISK MANAGEMENT PROGRAM. Please see a copy of my visit note below.    Medical Assistant Note:  Katty Mendoza presents today for lab draw.    Patient seen by provider today: Yes: Fatmata FREIRE.   present during visit today: Not Applicable.    Concerns: No Concerns.    Procedure:  Lab draw site: LAC, Needle type: BF, Gauge: 21. Gauze and coban applied    Post Assessment:  Labs drawn without difficulty: Yes.    Discharge Plan:  Departure Mode: Ambulatory.    Face to Face Time: 5.    Rebecca Lassiter MA                5/9/2019    Referring Provider: Juan Miguel Guan MD    Presenting Information:   I met with Katty Mendoza today for genetic counseling at the Cancer Risk Management Program at the [facility] due to her history of colon polyps.  She is here today to review this history and available genetic testing options.    Personal History:  Katty is a 38 year old female. Her first colonoscopy from 4/22/2019 was significant for 7 colon polyps.  This includes one 30mm sessile serrated adenoma of the ascending colon, one 20mm sessile serrated adenoma of the hepatic flexure, four 2-10mm hyperplastic polyps of the descending and transverse colon, and one 30mm recto-sigmoid tubular adenoma with high grade dysplasia.  She has a follow up colonoscopy scheduled for 7/18/2019 with Dr. Guan due to incomplete polyp resection, in which genetic test results have been requested.        She had her first menstrual period at age 15 and does not have children.  She reports a surgery to remove her right ovary at age 21 due to a large ovarian cyst.  She reports two benign breast biopsies (fibroadenomas).  She sees a dermatologist regularly and has a history of several benign moles removed.      Family History: (Please see scanned pedigree  for detailed family history information)    Katty's parents have both had normal colonoscopy screening.      Katty reports no maternal family history of cancer    Her paternal grandmother had a history of melanoma at age 60.      Her maternal ethnicity is Crist[place]/Hebrew. Her paternal ethnicity is Persian/Comoran.  There is no known Ashkenazi Druze ancestry on either side of her family.     Discussion:    Katty's personal history of colon polyps is suggestive of a possible hereditary cancer/polyposis syndrome.    We reviewed the features of sporadic, familial, and hereditary cancers. We discussed that Katty's family history does not present with features typically seen in families who have a hereditary cancer syndrome (such as early onset cancers, several relatives with same/related cancers or tumors, etc.).  However, her personal history of colon polyps may be suggestive of an underlying hereditary cancer syndrome, such as MUTYH-associated polyposis (MAP).  Based on her personal history of serrated polyps (including two sessile serrated adenomas larger than 10mm) it would be reasonable for Katty to consider genetic testing for the MUTYH gene.      We discussed the natural history and genetics of MUTYH-Associated Polyposis (MAP) caused by two mutations in the MUTYH gene (one mutation inherited from each parent).  Individuals with MAP can have hundreds to thousands of colon polyps, although most have fewer than 100 adenomatous polyps.  Individuals with MAP have up to an 80% risk of developing colon cancer by age 70 if not treated.  MAP is also associated with an increased risk of duodenal cancer.  Several other benign growths have been reported in MAP.      We discussed that there are additional genes that could cause increased risk for colon polyps and colorectal cancer. As many of these genes present with overlapping features in a family and accurate cancer risk cannot always be established based upon the pedigree  analysis alone, it would be reasonable for Katty to consider panel genetic testing to analyze multiple genes at once.    A detailed handout regarding hereditary colon cancer/polyposis and the information we discussed was provided to Katty at the end of our appointment today and can be found in the after visit summary. Topics included: inheritance pattern, cancer risks, cancer screening recommendations, and also risks, benefits and limitations of testing.    We reviewed that Katty's colon polyp history may also be suggestive of Serrated Polyposis Syndrome per current NCCN guidelines.  Currently Serrated Polyposis Syndrome is a clinical diagnosis based on personal colon polyp history; there are currently no known genes associated with this syndrome, though this may change in the future.  There are no estimates regarding the lifetime risks for colon cancer associated with this syndrome, however it is likely elevated. It is currently recommended that individuals with this diagnosis have colonoscopies every 1-3 years.  A diagnosis of Serrated Polyposis Syndrome may also change the colonoscopy screening recommendations for first degree relatives; per NCCN guidelines, first degree relatives (parents, siblings, children) should begin colonoscopies at age 40 and repeated every 5 years (or sooner) depending on the results of the exam.  This should be discussed further with her gastroenterologist should there be concern for SPS.      Genetic testing is available for 20 genes associated with increased risk for colon cancer and polyps: CustomNext-Cancer (APC, AXIN2, BMPR1A, CDH1, CHEK2, GREM1, EPCAM, MLH1, MSH2, MSH3, MSH6, MUTYH, NTHL1, PMS2, POLD1, POLE, PTEN, SMAD4, STK11, and TP53). This custom panel is a combination of the ColoNext panel and the AXIN2, MSH3, and NTHL1 genes.  We discussed that some of the genes in this panel are associated with specific hereditary cancer syndromes and have published management guidelines:  Quinones syndrome (MLH1, MSH2, MSH6, PMS2, EPCAM), Familial Adenomatous Polyposis (APC), MUTYH Associated Polyposis (MUTYH), Juvenile Polyposis syndrome (SMAD4, BMPR1A), Peutz-Jeghers syndrome (STK11), Cowden syndrome (PTEN), Li Fraumeni syndrome (TP53), and Hereditary Diffuse Gastric Cancer (CDH1).   The AXIN2, CHEK2, GREM1, MSH3, NTHL1, POLD1, and POLE genes have also been associated with increased colon cancer risk and have published management guidelines.  Consent was obtained and genetic testing for CustomNext-Cancer was sent to [facility]. Turn around time: 4 weeks.    Medical Management: For Katty, we reviewed that the information from genetic testing may determine:    additional cancer screening for which Katty may qualify (i.e. mammogram and breast MRI, more frequent colonoscopies, more frequent dermatologic exams, etc.),    And options for risk reducing surgeries Katty could consider, and/or treatment options for her polyp history      These recommendations will be discussed in detail once genetic testing is completed.     Plan:  1) Today Katty elected to proceed with the CustomNext-Cancer (ColoNext with AXIN2, MSH3, NTHL1) gene panel ordered through [facility].  2) This information should be available in 4 weeks.  3) Katty will schedule a return visit in 4-5 weeks to discuss the results.    Face to face time: 45 minutes    Fatmata Stone MS, [facility]  Licensed Genetic Counselor  [phone]              Again, thank you for allowing me to participate in the care of your patient.        Sincerely,        Fatmata Stone GC

## 2019-05-09 NOTE — PROGRESS NOTES
Medical Assistant Note:  Katty Mendoza presents today for lab draw.    Patient seen by provider today: Yes: Fatmata FREIRE.   present during visit today: Not Applicable.    Concerns: No Concerns.    Procedure:  Lab draw site: LAC, Needle type: BF, Gauge: 21. Gauze and coban applied    Post Assessment:  Labs drawn without difficulty: Yes.    Discharge Plan:  Departure Mode: Ambulatory.    Face to Face Time: 5.    Rebecca Lassiter MA

## 2019-06-12 ENCOUNTER — OFFICE VISIT (OUTPATIENT)
Dept: ONCOLOGY | Facility: CLINIC | Age: 39
End: 2019-06-12
Attending: GENETIC COUNSELOR, MS
Payer: COMMERCIAL

## 2019-06-12 DIAGNOSIS — K63.5 COLON POLYPOSIS: Primary | ICD-10-CM

## 2019-06-12 PROCEDURE — 40000072 ZZH STATISTIC GENETIC COUNSELING, < 16 MIN: Mod: ZF | Performed by: GENETIC COUNSELOR, MS

## 2019-06-12 NOTE — LETTER
"6/12/2019       RE: Katty Mendoza  [address]  [facility] [address]     Dear Colleague,    Thank you for referring your patient, Katty Mendoza, to the [facility]. Please see a copy of my visit note below.    6/12/2019    Referring Provider: Juan Miguel Guna MD    Presenting Information:  Katty Mendoza returned to the Cancer Risk Management Program at [facility] to discuss her genetic testing results. Her blood was drawn on 5/20/2019.  CustomNext-Cancer (ColoNext with AXIN2, MSH3, NTHL1) test was ordered from [facility]. This testing was done because of her personal history of colon polyps.    Genetic Testing Result: RODNEY Alaniz is negative for mutations in the APC, AXIN2, BMPR1A, CDH1, CHEK2, GREM1, EPCAM, MLH1, MSH2, MSH3, MSH6, MUTYH, NTHL1, PMS2, POLD1, POLE, PTEN, SMAD4, STK11, and TP53 genes. No mutations were found in any of the 20 genes analyzed. This test involved sequencing and deletion/duplication analysis of all genes with the exceptions of EPCAM and GREM1 (deletions and duplications only).    Testing did not detect an identifiable mutation associated with Quinones syndrome (MLH1, MSH2, MSH6, PMS2, EPCAM), Familial Adenomatous Polyposis (APC), Hereditary Diffuse Gastric Cancer (CDH1), Juvenile Polyposis syndrome (BMPR1A, SMAD4), Cowden syndrome (PTEN), Li Fraumeni syndrome (TP53), Peutz-Jeghers syndrome (STK11), or MUTYH Associated Polyposis (MUTYH).    A copy of the test report can be found in the Laboratory tab, dated 5/9/2019, and named \"SEND OUTS [facility] TEST\". The report is scanned in as a linked document.    Interpretation:  We discussed several different interpretations of this negative test result.    1. One explanation may be that there is a different gene or combination of genes and environment that are associated with Katty's colon polyp history.   2. There is also a small possibility that there is a mutation in one of these genes and we could not " find it with our current testing methods.       Screening:  Based on this negative test result, it is important for Katty and her relatives to refer back to the family history for appropriate cancer screening.      We reviewed that Katty's colon polyp history may also be suggestive of Serrated Polyposis Syndrome per current NCCN guidelines.  Currently Serrated Polyposis Syndrome is a clinical diagnosis based on personal colon polyp history; there are currently no known genes associated with this syndrome, though this may change in the future.  There are no estimates regarding the lifetime risks for colon cancer associated with this syndrome, however it is likely elevated. It is currently recommended that individuals with this diagnosis have colonoscopies every 1-3 years.  A diagnosis of Serrated Polyposis Syndrome may also change the colonoscopy screening recommendations for first degree relatives; per NCCN guidelines, first degree relatives (parents, siblings, children) should begin colonoscopies at age 40 and repeated every 5 years (or sooner) depending on the results of the exam.  This should be discussed further with her gastroenterologist should there be concern for SPS.     In addition per National Comprehensive Cancer Network (NCCN) guidelines, individuals with a first-degree relative with confirmed advanced adenoma should begin colonoscopy at age 40, or at age of onset of the adenoma in their relative, whichever comes first.  In this family, colonoscopy should begin at age 37.  This would apply to Katty's brother.       Summary:  We do not have an explanation for Katty's history of colon polyps. Because of that, it is important that she continue with cancer screening based on her personal and family history as discussed above.    Genetic testing is rapidly advancing, and new cancer susceptibility genes will most likely be identified in the future. Therefore, I encouraged Katty to contact me annually or if  there are changes in her personal or family history. This may change how we assess her cancer risk, screening, and the testing we would offer.    Plan:  1.  I provided Katty with a copy of her test results today and a handout summarizing negative genetic test results (see after visit summary).  2. She plans to follow-up with Dr. Guan.  3. She should contact me annually, or sooner if her family history changes.    If Katty has any further questions, I encouraged her to contact me at [phone].    Time spent face to face: 15 minutes    Fatmata Stone MS, [facility]  Licensed Genetic Counselor  [phone]

## 2019-06-12 NOTE — LETTER
Cancer Risk Management  Program Locations    [facility]  [facility]  Mailing Address  Cancer Risk Management Program  [facility]  [address]  [place] [address]    New patient appointments  [phone]  June 13, 2019    Katty Mendoza  [address]   [facility] [address]      Dear Katty,    It was a pleasure meeting with you at the [facility] on 6/12/2019.  Here is a copy of the progress note from your recent genetic counseling visit to the Cancer Risk Management Program.  If you have any additional questions, please feel free to call.    Referring Provider: Juan Miguel Guan MD    Presenting Information:  Katty Mendoza returned to the Cancer Risk Management Program at [facility] to discuss her genetic testing results. Her blood was drawn on 5/20/2019.  CustomNext-Cancer (ColoNext with AXIN2, MSH3, NTHL1) test was ordered from [facility]. This testing was done because of her personal history of colon polyps.    Genetic Testing Result: NEGATIVE  Katty is negative for mutations in the APC, AXIN2, BMPR1A, CDH1, CHEK2, GREM1, EPCAM, MLH1, MSH2, MSH3, MSH6, MUTYH, NTHL1, PMS2, POLD1, POLE, PTEN, SMAD4, STK11, and TP53 genes. No mutations were found in any of the 20 genes analyzed. This test involved sequencing and deletion/duplication analysis of all genes with the exceptions of EPCAM and GREM1 (deletions and duplications only).    Testing did not detect an identifiable mutation associated with Quinones syndrome (MLH1, MSH2, MSH6, PMS2, EPCAM), Familial Adenomatous Polyposis (APC), Hereditary Diffuse Gastric Cancer (CDH1), Juvenile Polyposis syndrome (BMPR1A, SMAD4), Cowden syndrome (PTEN), Li Fraumeni syndrome (TP53), Peutz-Jeghers syndrome (STK11), or MUTYH Associated Polyposis  (MUTYH).    Interpretation:  We discussed several different interpretations of this negative test result.    1. One explanation may be that there is a different gene or combination of genes and environment that are associated with Katty's colon polyp history.   2. There is also a small possibility that there is a mutation in one of these genes and we could not find it with our current testing methods.       Screening:  Based on this negative test result, it is important for Katty and her relatives to refer back to the family history for appropriate cancer screening.      We reviewed that Katty's colon polyp history may also be suggestive of Serrated Polyposis Syndrome per current NCCN guidelines.  Currently Serrated Polyposis Syndrome is a clinical diagnosis based on personal colon polyp history; there are currently no known genes associated with this syndrome, though this may change in the future.  There are no estimates regarding the lifetime risks for colon cancer associated with this syndrome, however it is likely elevated. It is currently recommended that individuals with this diagnosis have colonoscopies every 1-3 years.  A diagnosis of Serrated Polyposis Syndrome may also change the colonoscopy screening recommendations for first degree relatives; per NCCN guidelines, first degree relatives (parents, siblings, children) should begin colonoscopies at age 40 and repeated every 5 years (or sooner) depending on the results of the exam.  This should be discussed further with her gastroenterologist should there be concern for SPS.     In addition per National Comprehensive Cancer Network (NCCN) guidelines, individuals with a first-degree relative with confirmed advanced adenoma should begin colonoscopy at age 40, or at age of onset of the adenoma in their relative, whichever comes first.  In this family, colonoscopy should begin at age 37.  This would apply to Katty's brother.       Summary:  We do not have an  explanation for Katty's history of colon polyps. Because of that, it is important that she continue with cancer screening based on her personal and family history as discussed above.    Genetic testing is rapidly advancing, and new cancer susceptibility genes will most likely be identified in the future. Therefore, I encouraged Katty to contact me annually or if there are changes in her personal or family history. This may change how we assess her cancer risk, screening, and the testing we would offer.    Plan:  1.  I provided Katty with a copy of her test results today and a handout summarizing negative genetic test results (see after visit summary).  2. She plans to follow-up with Dr. Guan.  3. She should contact me annually, or sooner if her family history changes.    If Katty has any further questions, I encouraged her to contact me at [phone].    Fatmata Stone MS, [facility]  Licensed Genetic Counselor  [phone]

## 2019-06-12 NOTE — PATIENT INSTRUCTIONS
Negative Genetic Test Result    Genetic Testing  You had a blood test that looked at the genetic information in one or more genes associated with increased cancer risk.  The testing looked for any harmful changes that would stop this particular gene from working like it should. If an individual does not have any harmful changes or variants of unknown significance found from their blood test, their genetic test result is reported as negative.       Results  The genetic test did not identify any pathogenic (harmful) changes in the genes that were tested. There are several possible explanations for a negative test result. Without knowing the gene mutation in your family, the cause of the cancer in you or your relatives is still unknown. Your genetic counselor can help interpret the result for you and your relatives. In this case, there are several reasons that may explain the negative test result:    There may be a gene mutation in the family that you did not inherit.     You may have a gene mutation in a different gene that was not included in the test, or has not yet been discovered.     The cancers in you or your family may be due to a combination of genetic factors and environment (multifactorial/familial).    The cancers in you or your family may be sporadic/random cancers.    There is very small chance that a mutation was not found by current testing methods.  As testing technology evolves over time, it may still be possible to identify a mutation in a gene that was not found on this test.    It is important to note which genes were included in your test. A list of these genes can be found on your test result.    Screening Recommendations  Due to this negative test result, cancer screening recommendations should be based on your personal and family history. This may include increased cancer screening for you and/or your family members. Your genetic counselor and health care provider can help make  appropriate recommendations.      Please call us if you have any questions or concerns.   Cancer Risk Management Program [phone] ([phone])  ? Patricia Gutierrez, MS, [facility]  [phone]  ? Keyana Hopkins, MS, [facility]  [phone]  ? Fatmata Stone, MS, [facility]  [phone]  ? Caesar Baker, MS, [facility]  [phone]  ? Kiarra Cain, MS, [facility] [phone]  ? Sherlyn Tejada, MS, [facility] [phone]

## 2019-06-12 NOTE — PROGRESS NOTES
"6/12/2019    Referring Provider: Juan Miguel Guan MD    Presenting Information:  Katty Mendoza returned to the Cancer Risk Management Program at [facility] to discuss her genetic testing results. Her blood was drawn on 5/20/2019.  CustomNext-Cancer (ColoNext with AXIN2, MSH3, NTHL1) test was ordered from [employer]. This testing was done because of her personal history of colon polyps.    Genetic Testing Result: NEGATIVE  Katty is negative for mutations in the APC, AXIN2, BMPR1A, CDH1, CHEK2, GREM1, EPCAM, MLH1, MSH2, MSH3, MSH6, MUTYH, NTHL1, PMS2, POLD1, POLE, PTEN, SMAD4, STK11, and TP53 genes. No mutations were found in any of the 20 genes analyzed. This test involved sequencing and deletion/duplication analysis of all genes with the exceptions of EPCAM and GREM1 (deletions and duplications only).    Testing did not detect an identifiable mutation associated with Quinones syndrome (MLH1, MSH2, MSH6, PMS2, EPCAM), Familial Adenomatous Polyposis (APC), Hereditary Diffuse Gastric Cancer (CDH1), Juvenile Polyposis syndrome (BMPR1A, SMAD4), Cowden syndrome (PTEN), Li Fraumeni syndrome (TP53), Peutz-Jeghers syndrome (STK11), or MUTYH Associated Polyposis (MUTYH).    A copy of the test report can be found in the Laboratory tab, dated 5/9/2019, and named \"[facility]\". The report is scanned in as a linked document.    Interpretation:  We discussed several different interpretations of this negative test result.    1. One explanation may be that there is a different gene or combination of genes and environment that are associated with Katty's colon polyp history.   2. There is also a small possibility that there is a mutation in one of these genes and we could not find it with our current testing methods.       Screening:  Based on this negative test result, it is important for Katty and her relatives to refer back to the family history for appropriate cancer screening.      We reviewed that Katty's colon " polyp history may also be suggestive of Serrated Polyposis Syndrome per current NCCN guidelines.  Currently Serrated Polyposis Syndrome is a clinical diagnosis based on personal colon polyp history; there are currently no known genes associated with this syndrome, though this may change in the future.  There are no estimates regarding the lifetime risks for colon cancer associated with this syndrome, however it is likely elevated. It is currently recommended that individuals with this diagnosis have colonoscopies every 1-3 years.  A diagnosis of Serrated Polyposis Syndrome may also change the colonoscopy screening recommendations for first degree relatives; per NCCN guidelines, first degree relatives (parents, siblings, children) should begin colonoscopies at age 40 and repeated every 5 years (or sooner) depending on the results of the exam.  This should be discussed further with her gastroenterologist should there be concern for SPS.     In addition per National Comprehensive Cancer Network (NCCN) guidelines, individuals with a first-degree relative with confirmed advanced adenoma should begin colonoscopy at age 40, or at age of onset of the adenoma in their relative, whichever comes first.  In this family, colonoscopy should begin at age 37.  This would apply to Katty's brother.       Summary:  We do not have an explanation for Katty's history of colon polyps. Because of that, it is important that she continue with cancer screening based on her personal and family history as discussed above.    Genetic testing is rapidly advancing, and new cancer susceptibility genes will most likely be identified in the future. Therefore, I encouraged Katty to contact me annually or if there are changes in her personal or family history. This may change how we assess her cancer risk, screening, and the testing we would offer.    Plan:  1.  I provided Katty with a copy of her test results today and a handout summarizing negative  genetic test results (see after visit summary).  2. She plans to follow-up with Dr. Guan.  3. She should contact me annually, or sooner if her family history changes.    If Katty has any further questions, I encouraged her to contact me at [phone].    Time spent face to face: 15 minutes    Fatmata Stone MS, [facility]  Licensed Genetic Counselor  [phone]

## 2019-06-21 ENCOUNTER — OFFICE VISIT (OUTPATIENT)
Dept: INTERNAL MEDICINE | Facility: CLINIC | Age: 39
End: 2019-06-21
Attending: INTERNAL MEDICINE
Payer: COMMERCIAL

## 2019-06-21 VITALS
HEART RATE: 83 BPM | HEIGHT: 70 IN | WEIGHT: 198.6 LBS | DIASTOLIC BLOOD PRESSURE: 70 MMHG | BODY MASS INDEX: 28.43 KG/M2 | SYSTOLIC BLOOD PRESSURE: 111 MMHG | RESPIRATION RATE: 18 BRPM

## 2019-06-21 DIAGNOSIS — Z01.818 PREOP GENERAL PHYSICAL EXAM: Primary | ICD-10-CM

## 2019-06-21 PROCEDURE — G0463 HOSPITAL OUTPT CLINIC VISIT: HCPCS | Mod: ZF

## 2019-06-21 ASSESSMENT — PAIN SCALES - GENERAL: PAINLEVEL: NO PAIN (0)

## 2019-06-21 ASSESSMENT — MIFFLIN-ST. JEOR: SCORE: 1666.09

## 2019-06-21 NOTE — NURSING NOTE
Chief Complaint   Patient presents with     Pre-Op Exam   7. at [facility]   colonscopy

## 2019-06-21 NOTE — PROGRESS NOTES
[facility]   [facility]  [address]  [address]  [facility]  [facility] [id number] [phone]  Dept: [phone]    PRE-OP EVALUATION:  Today's date: 2019    Katty Mendoza (: 1980) presents for pre-operative evaluation assessment as requested by Dr. Juan Miguel Guan.  She requires evaluation and anesthesia risk assessment prior to undergoing surgery/procedure for treatment of Colon polyp .    Proposed Surgery/ Procedure: Colonosocpy  Date of Surgery/ Procedure: 19  Hospital/Surgical Facility: [facility]  Primary Physician: Cheri Tatum  Type of Anesthesia Anticipated: General        1. NO - Do you have a history of heart attack, stroke, stent, bypass or surgery on an artery in the head, neck, heart or legs?  2. NO - Do you ever have any pain or discomfort in your chest?  3. NO - Do you have a history of  Heart Failure?  4. NO - Are you troubled by shortness of breath when: walking on the level, up a slight hill or at night?  5. NO - Do you currently have a cold, bronchitis or other respiratory infection?  6. NO - Do you have a cough, shortness of breath or wheezing?  7. NO - Do you sometimes get pains in the calves of your legs when you walk?  8. NO - Do you or anyone in your family have previous history of blood clots?  9. NO - Do you or does anyone in your family have a serious bleeding problem such as prolonged bleeding following surgeries or cuts?  10. NO - Have you ever had problems with anemia or been told to take iron pills?  11. NO - Have you had any abnormal blood loss such as black, tarry or bloody stools, or abnormal vaginal bleeding?  12. NO - Have you ever had a blood transfusion?  13. NO - Have you or any of your relatives ever had problems with anesthesia?  14. NO - Do you have sleep apnea, excessive snoring or daytime drowsiness?  15. NO - Do you have any prosthetic heart valves?  16. NO - Do you have prosthetic  joints?  17. NO - Is there any chance that you may be pregnant?      HPI:     HPI related to upcoming procedure: Katty is a 38 yoF who presented with persistent rectal bleeding. She underwent colonoscopy in April 2019 and was found to have multiple large polyps, including one with high-grade dysplasia. She is undergoing the upcoming colonoscopy for further evaluation and potential further excision. She has seen genetics and was negative for any gene associated with high-risk for colon cancer. No fam hx.       MEDICAL HISTORY:     Patient Active Problem List    Diagnosis Date Noted     Vitamin D deficiency 10/15/2015     Priority: Medium      Past Medical History:   Diagnosis Date     Anxiety      Colon polyps      Past Surgical History:   Procedure Laterality Date     BREAST BIOPSY, RT/LT Right 2008    times 2; fibroadenoma     BREAST SURGERY  2007,2013     COLONOSCOPY N/A 4/22/2019    Procedure: COMBINED COLONOSCOPY, SINGLE OR MULTIPLE BIOPSY/POLYPECTOMY BY BIOPSY;  Surgeon: Juan Miguel Guan MD;  Location: [facility] OR     GYN SURGERY  2002     OOPHORECTOMY Right 2002    complete Dr. Villalta     OOPHORECTOMY Left 2002    partial left  Dr. Villalta     Current Outpatient Medications   Medication Sig Dispense Refill     adapalene (DIFFERIN) 0.1 % cream At bedtime 45 g 11     cholecalciferol (VITAMIN  -D) 1000 UNITS capsule Take 4 capsules (4,000 Units) by mouth daily Take one capsule daily. 360 capsule 1     OTC products: None, except as noted above    No Known Allergies   Latex Allergy: NO    Social History     Tobacco Use     Smoking status: Never Smoker     Smokeless tobacco: Never Used   Substance Use Topics     Alcohol use: Yes     Alcohol/week: 0.5 - 1.0 oz     History   Drug Use No       REVIEW OF SYSTEMS:   Constitutional, neuro, ENT, endocrine, pulmonary, cardiac, gastrointestinal, genitourinary, musculoskeletal, integument and psychiatric systems are negative, except as otherwise noted.    EXAM:   /70   Pulse 83  "  Resp 18   Ht 1.778 m (5' 10\")   Wt 90.1 kg (198 lb 9.6 oz)   LMP 06/20/2019   BMI 28.50 kg/m      GENERAL APPEARANCE: healthy, alert and no distress     EYES: EOMI, PERRL     HENT: ear canals and TM's normal and nose and mouth without ulcers or lesions     NECK: no adenopathy, no asymmetry, masses, or scars and thyroid normal to palpation     RESP: lungs clear to auscultation - no rales, rhonchi or wheezes     CV: regular rates and rhythm, normal S1 S2, no S3 or S4 and no murmur, click or rub     ABDOMEN:  soft, nontender, no HSM or masses and bowel sounds normal     MS: extremities normal- no gross deformities noted, no evidence of inflammation in joints, FROM in all extremities.     SKIN: no suspicious lesions or rashes     NEURO: Normal strength and tone, sensory exam grossly normal, mentation intact and speech normal     PSYCH: mentation appears normal. and affect normal/bright     LYMPHATICS: No cervical adenopathy    DIAGNOSTICS:   No labs or EKG required for low risk surgery (cataract, skin procedure, breast biopsy, etc)    Recent Labs   Lab Test 03/15/19  0906   HGB 13.8           IMPRESSION:   Reason for surgery/procedure: Colon polyp  Diagnosis/reason for consult: Pre-op    The proposed surgical procedure is considered LOW risk.    REVISED CARDIAC RISK INDEX  The patient has the following serious cardiovascular risks for perioperative complications such as (MI, PE, VFib and 3  AV Block):  No serious cardiac risks  INTERPRETATION: 0 risks: Class I (very low risk - 0.4% complication rate)    The patient has the following additional risks for perioperative complications:  No identified additional risks      ICD-10-CM    1. Preop general physical exam Z01.818        RECOMMENDATIONS:       Cardiovascular Risk  Performs 4 METs exercise without symptoms (Walk on level ground at 15 minutes per mile (4 miles/hour)) .       --Patient is on no chronic medications    APPROVAL GIVEN to proceed with " proposed procedure, without further diagnostic evaluation       Signed Electronically by: Cheri Rios MD    Copy of this evaluation report is provided to requesting physician.    [facility] Preop Guidelines    Revised Cardiac Risk Index

## 2019-06-21 NOTE — LETTER
2019       RE: Katty Mendoza  [address]  [place] [facility] [id number]     Dear Colleague,    Thank you for referring your patient, Katty Mendoza, to the [facility]  at [facility]. Please see a copy of my visit note below.    [facility]   [facility]  3rd Flr,[facility] [address]  [address]  [address]  [facility] [id number] [phone]  Dept: [phone]    PRE-OP EVALUATION:  Today's date: 2019    Katty Mendoza (: 1980) presents for pre-operative evaluation assessment as requested by Dr. Juan Miguel Guan.  She requires evaluation and anesthesia risk assessment prior to undergoing surgery/procedure for treatment of Colon polyp .    Proposed Surgery/ Procedure: Colonosocpy  Date of Surgery/ Procedure: 19  Hospital/Surgical Facility: [facility]  Primary Physician: Cheri Tatum  Type of Anesthesia Anticipated: General        1. NO - Do you have a history of heart attack, stroke, stent, bypass or surgery on an artery in the head, neck, heart or legs?  2. NO - Do you ever have any pain or discomfort in your chest?  3. NO - Do you have a history of  Heart Failure?  4. NO - Are you troubled by shortness of breath when: walking on the level, up a slight hill or at night?  5. NO - Do you currently have a cold, bronchitis or other respiratory infection?  6. NO - Do you have a cough, shortness of breath or wheezing?  7. NO - Do you sometimes get pains in the calves of your legs when you walk?  8. NO - Do you or anyone in your family have previous history of blood clots?  9. NO - Do you or does anyone in your family have a serious bleeding problem such as prolonged bleeding following surgeries or cuts?  10. NO - Have you ever had problems with anemia or been told to take iron pills?  11. NO - Have you had any abnormal blood loss such as black, tarry or bloody stools, or abnormal vaginal  bleeding?  12. NO - Have you ever had a blood transfusion?  13. NO - Have you or any of your relatives ever had problems with anesthesia?  14. NO - Do you have sleep apnea, excessive snoring or daytime drowsiness?  15. NO - Do you have any prosthetic heart valves?  16. NO - Do you have prosthetic joints?  17. NO - Is there any chance that you may be pregnant?      HPI:     HPI related to upcoming procedure: Katty is a 38 yoF who presented with persistent rectal bleeding. She underwent colonoscopy in April 2019 and was found to have multiple large polyps, including one with high-grade dysplasia. She is undergoing the upcoming colonoscopy for further evaluation and potential further excision. She has seen genetics and was negative for any gene associated with high-risk for colon cancer. No fam hx.       MEDICAL HISTORY:     Patient Active Problem List    Diagnosis Date Noted     Vitamin D deficiency 10/15/2015     Priority: Medium      Past Medical History:   Diagnosis Date     Anxiety      Colon polyps      Past Surgical History:   Procedure Laterality Date     BREAST BIOPSY, RT/LT Right 2008    times 2; fibroadenoma     BREAST SURGERY  2007,2013     COLONOSCOPY N/A 4/22/2019    Procedure: COMBINED COLONOSCOPY, SINGLE OR MULTIPLE BIOPSY/POLYPECTOMY BY BIOPSY;  Surgeon: Juan Miguel Guan MD;  Location: [facility] OR     GYN SURGERY  2002     OOPHORECTOMY Right 2002    complete Dr. Villalta     OOPHORECTOMY Left 2002    partial left  Dr. Villalta     Current Outpatient Medications   Medication Sig Dispense Refill     adapalene (DIFFERIN) 0.1 % cream At bedtime 45 g 11     cholecalciferol (VITAMIN  -D) 1000 UNITS capsule Take 4 capsules (4,000 Units) by mouth daily Take one capsule daily. 360 capsule 1     OTC products: None, except as noted above    No Known Allergies   Latex Allergy: NO    Social History     Tobacco Use     Smoking status: Never Smoker     Smokeless tobacco: Never Used   Substance Use Topics     Alcohol use: Yes      "Alcohol/week: 0.5 - 1.0 oz     History   Drug Use No       REVIEW OF SYSTEMS:   Constitutional, neuro, ENT, endocrine, pulmonary, cardiac, gastrointestinal, genitourinary, musculoskeletal, integument and psychiatric systems are negative, except as otherwise noted.    EXAM:   /70   Pulse 83   Resp 18   Ht 1.778 m (5' 10\")   Wt 90.1 kg (198 lb 9.6 oz)   LMP 06/20/2019   BMI 28.50 kg/m       GENERAL APPEARANCE: healthy, alert and no distress     EYES: EOMI, PERRL     HENT: ear canals and TM's normal and nose and mouth without ulcers or lesions     NECK: no adenopathy, no asymmetry, masses, or scars and thyroid normal to palpation     RESP: lungs clear to auscultation - no rales, rhonchi or wheezes     CV: regular rates and rhythm, normal S1 S2, no S3 or S4 and no murmur, click or rub     ABDOMEN:  soft, nontender, no HSM or masses and bowel sounds normal     MS: extremities normal- no gross deformities noted, no evidence of inflammation in joints, FROM in all extremities.     SKIN: no suspicious lesions or rashes     NEURO: Normal strength and tone, sensory exam grossly normal, mentation intact and speech normal     PSYCH: mentation appears normal. and affect normal/bright     LYMPHATICS: No cervical adenopathy    DIAGNOSTICS:   No labs or EKG required for low risk surgery (cataract, skin procedure, breast biopsy, etc)    Recent Labs   Lab Test 03/15/19  0906   HGB 13.8           IMPRESSION:   Reason for surgery/procedure: Colon polyp  Diagnosis/reason for consult: Pre-op    The proposed surgical procedure is considered LOW risk.    REVISED CARDIAC RISK INDEX  The patient has the following serious cardiovascular risks for perioperative complications such as (MI, PE, VFib and 3  AV Block):  No serious cardiac risks  INTERPRETATION: 0 risks: Class I (very low risk - 0.4% complication rate)    The patient has the following additional risks for perioperative complications:  No identified additional " risks      ICD-10-CM    1. Preop general physical exam Z01.818        RECOMMENDATIONS:       Cardiovascular Risk  Performs 4 METs exercise without symptoms (Walk on level ground at 15 minutes per mile (4 miles/hour)) .    --Patient is on no chronic medications    APPROVAL GIVEN to proceed with proposed procedure, without further diagnostic evaluation     Signed Electronically by: Cheri Rios MD    Copy of this evaluation report is provided to requesting physician.    [facility] Preop Guidelines    Revised Cardiac Risk Index

## 2019-06-21 NOTE — PATIENT INSTRUCTIONS
Before Your Surgery      Call your surgeon if there is any change in your health. This includes signs of a cold or flu (such as a sore throat, runny nose, cough, rash or fever).    Do not smoke, drink alcohol or take over the counter medicine (unless your surgeon or primary care doctor tells you to) for the 24 hours before and after surgery.    If you take prescribed drugs: Follow your doctor s orders about which medicines to take and which to stop until after surgery.  o Not applicable    Eating and drinking prior to surgery: follow the instructions from your surgeon    Take a shower or bath the night before surgery. Use the soap your surgeon gave you to gently clean your skin. If you do not have soap from your surgeon, use your regular soap. Do not shave or scrub the surgery site.  Wear clean pajamas and have clean sheets on your bed.

## 2019-06-28 ENCOUNTER — OFFICE VISIT (OUTPATIENT)
Dept: OBGYN | Facility: CLINIC | Age: 39
End: 2019-06-28
Attending: OBSTETRICS & GYNECOLOGY
Payer: COMMERCIAL

## 2019-06-28 ENCOUNTER — APPOINTMENT (OUTPATIENT)
Dept: LAB | Facility: CLINIC | Age: 39
End: 2019-06-28
Attending: OBSTETRICS & GYNECOLOGY
Payer: COMMERCIAL

## 2019-06-28 ENCOUNTER — TELEPHONE (OUTPATIENT)
Dept: OBGYN | Facility: CLINIC | Age: 39
End: 2019-06-28

## 2019-06-28 VITALS
DIASTOLIC BLOOD PRESSURE: 79 MMHG | HEART RATE: 71 BPM | BODY MASS INDEX: 28.49 KG/M2 | SYSTOLIC BLOOD PRESSURE: 122 MMHG | WEIGHT: 199 LBS | HEIGHT: 70 IN

## 2019-06-28 DIAGNOSIS — Z01.419 ENCOUNTER FOR GYNECOLOGICAL EXAMINATION WITHOUT ABNORMAL FINDING: Primary | ICD-10-CM

## 2019-06-28 DIAGNOSIS — Z12.4 SCREENING FOR MALIGNANT NEOPLASM OF CERVIX: ICD-10-CM

## 2019-06-28 DIAGNOSIS — Z90.721 HISTORY OF OOPHORECTOMY, UNILATERAL: ICD-10-CM

## 2019-06-28 PROCEDURE — G0463 HOSPITAL OUTPT CLINIC VISIT: HCPCS | Mod: ZF

## 2019-06-28 PROCEDURE — 83520 IMMUNOASSAY QUANT NOS NONAB: CPT | Performed by: OBSTETRICS & GYNECOLOGY

## 2019-06-28 PROCEDURE — 87624 HPV HI-RISK TYP POOLED RSLT: CPT | Performed by: OBSTETRICS & GYNECOLOGY

## 2019-06-28 PROCEDURE — G0145 SCR C/V CYTO,THINLAYER,RESCR: HCPCS | Performed by: OBSTETRICS & GYNECOLOGY

## 2019-06-28 PROCEDURE — 36415 COLL VENOUS BLD VENIPUNCTURE: CPT | Performed by: OBSTETRICS & GYNECOLOGY

## 2019-06-28 ASSESSMENT — ANXIETY QUESTIONNAIRES
5. BEING SO RESTLESS THAT IT IS HARD TO SIT STILL: NOT AT ALL
GAD7 TOTAL SCORE: 0
3. WORRYING TOO MUCH ABOUT DIFFERENT THINGS: NOT AT ALL
1. FEELING NERVOUS, ANXIOUS, OR ON EDGE: NOT AT ALL
7. FEELING AFRAID AS IF SOMETHING AWFUL MIGHT HAPPEN: NOT AT ALL
6. BECOMING EASILY ANNOYED OR IRRITABLE: NOT AT ALL
2. NOT BEING ABLE TO STOP OR CONTROL WORRYING: NOT AT ALL

## 2019-06-28 ASSESSMENT — MIFFLIN-ST. JEOR: SCORE: 1667.91

## 2019-06-28 ASSESSMENT — PATIENT HEALTH QUESTIONNAIRE - PHQ9
SUM OF ALL RESPONSES TO PHQ QUESTIONS 1-9: 0
5. POOR APPETITE OR OVEREATING: NOT AT ALL

## 2019-06-28 ASSESSMENT — PAIN SCALES - GENERAL: PAINLEVEL: NO PAIN (0)

## 2019-06-28 NOTE — PROGRESS NOTES
Progress Note    SUBJECTIVE:  Katty Mendoza is an 38 year old  , who requests a breast and pelvic exam.    Patient is followed by Dr. Jerzy Rios for primary care.    Concerns today include: Patient is concerned about HPV risk given history of HPV positive pap in past.  Desires yearly pap testing.  Wonders about getting new Gardisil vaccine (had initially available vaccine at age 27) and will check with insurance.    Patient had bilateral ovarian surgery at age 23 with Dr. Villalta.  Is concerned about how much ovarian tissue was removed and if she is at risk for early menopause.  No symptoms of menopause now and regular monthly menses.  Will request records (not available in Epic) and check labs to assess ovarian reserve.    Has colonoscopy scheduled for excision of sessile polyp.    Menstrual History:  Menstrual History 6/15/2016 2017 2018 2018 2019   LAST MENSTRUAL PERIOD - 2017 - 2019   Menarche Age - - - - -   Period Cycle (Days) 24-28 - - 28 -   Period Duration (Days) 5 - - 3 -   Method of Contraception Condoms - - None -   Period Pattern Regular - - Regular -   Menstrual Flow Moderate - - Moderate -   Menstrual Control - - - - -   Dysmenorrhea - - - - -   Reviewed Today Yes - - Yes -       Last    Lab Results   Component Value Date    PAP NIL 2018     History of abnormal Pap smear: history of other HPV positive pap, 2 negative HPV/NIL paps since.    Last   Lab Results   Component Value Date    HPV16 Negative 2018     Last   Lab Results   Component Value Date    HPV18 Negative 2018     Last   Lab Results   Component Value Date    HRHPV Negative 2018       Mammogram current: not applicable    HISTORY:  Prescription Medications as of 2019       Rx Number Disp Refills Start End Last Dispensed Date Next Fill Date Owning Pharmacy    adapalene (DIFFERIN) 0.1 % cream  45 g 11 2018    [id number] - [place], [place] -  [address] [facility]    Sig: At bedtime    Class: E-Prescribe    cholecalciferol (VITAMIN  -D) 1000 UNITS capsule  360 capsule 1 2017    [facility] [address] [place] [address] [address] [facility]    Sig: Take 4 capsules (4,000 Units) by mouth daily Take one capsule daily.    Class: E-Prescribe    Route: Oral        No Known Allergies  Immunization History   Administered Date(s) Administered     HPV 2007, 2007, 10/23/2008     HepB 2004, 2005     Influenza (IIV3) PF 2005, 2005, 10/23/2008     TD (ADULT, 7+) 1996, 2002, 2005     Tdap (Adacel,Boostrix) 2009       OB History    Para Term  AB Living   0 0 0 0 0 0   SAB TAB Ectopic Multiple Live Births   0 0 0 0 0     Past Medical History:   Diagnosis Date     Anxiety      Colon polyps      Past Surgical History:   Procedure Laterality Date     BREAST BIOPSY, RT/LT Right 2008    times 2; fibroadenoma     BREAST SURGERY  ,     COLONOSCOPY N/A 2019    Procedure: COMBINED COLONOSCOPY, SINGLE OR MULTIPLE BIOPSY/POLYPECTOMY BY BIOPSY;  Surgeon: Juan Miguel Guan MD;  Location[facility]     GYN SURGERY  2002     OOPHORECTOMY Right 2002    complete Dr. Villalta     OOPHORECTOMY Left 2002    partial left  Dr. Villalta     Family History   Problem Relation Age of Onset     Cancer Paternal Grandmother         melanoma     Melanoma Paternal Grandmother      Lipids Father      Osteoporosis Maternal Grandmother      Cerebrovascular Disease Maternal Grandfather      Asthma No family hx of      C.A.D. 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   Social Needs     Financial resource strain: None     Food insecurity:     Worry: None     Inability: None     Transportation needs:     Medical: None     Non-medical: None   Tobacco Use     Smoking status: Never Smoker     Smokeless tobacco: Never Used   Substance and Sexual Activity     Alcohol use: Yes     Alcohol/week: 0.5 - 1.0 oz     Drug use: No     Sexual activity: Not Currently     Partners: Male     Birth control/protection: Condom, None   Lifestyle     Physical activity:     Days per week: None     Minutes per session: None     Stress: None   Relationships     Social connections:     Talks on phone: None     Gets together: None     Attends Synagogue service: None     Active member of club or organization: None     Attends meetings of clubs or organizations: None     Relationship status: None     Intimate partner violence:     Fear of current or ex partner: None     Emotionally abused: None     Physically abused: None     Forced sexual activity: None   Other Topics Concern     Parent/sibling w/ CABG, MI or angioplasty before 65F 55M? Not Asked      Service No     Blood Transfusions No     Caffeine Concern No     Occupational Exposure No     Hobby Hazards No     Sleep Concern No     Stress Concern Yes     Comment: needs alone time     Weight Concern No     Special Diet No     Back Care No     Exercise Yes     Bike Helmet Yes     Seat Belt Yes     Self-Exams Yes   Social History Narrative    How much exercise per week? Runs 3-4 times per week    How much calcium per day? Some, milk and yogurt       How much caffeine per day? 1-2 cups per day    How much vitamin D per day? None    Do you/your family wear seatbelts?  Yes    Do you/your family use safety helmets? Yes    Do you/your family use sunscreen? Yes    Do you/your family keep firearms in the home? No    Do you/your family have a smoke detector(s)? Yes        Do you feel safe in your home? Yes     "Has anyone ever touched you in an unwanted manner? No     Explain          Maida 10, 2015 Estela Rodriguez LPN                    5/14/14        [place]. Enjoys running and road biking.     Dating, heterosexual.     Megan Uriostegui MD           ROS    EXAM:  Blood pressure 122/79, pulse 71, height 1.778 m (5' 10\"), weight 90.3 kg (199 lb), last menstrual period 06/20/2019, not currently breastfeeding. Body mass index is 28.55 kg/m .  General appearance: Pleasant female in no acute distress.     BREAST EXAM:  Breast: Without visible skin changes. No dimpling or lesions seen.   Breasts supple, non-tender with palpation, no dominant mass, nodularity, or nipple discharge noted bilaterally. Axillary nodes negative.      PELVIC EXAM:  EG/BUS: Normal genital architecture without lesions, erythema or abnormal secretions Bartholin's, Urethra, [place]'s normal   Urethral meatus: normal    Urethra: no masses, tenderness, or scarring    Bladder: no masses or tenderness    Vagina: moist, pink, rugae with creamy, white and odorless  secretions  Cervix: no lesions and pink, moist, closed, without lesion or CMT  Uterus: anteverted,  and small, smooth, firm, mobile w/o pain  Adnexa: Within normal limits and No masses, nodularity, tenderness  Rectum:anus normal       ASSESSMENT:  Encounter Diagnoses   Name Primary?     Encounter for gynecological examination without abnormal finding Yes     Screening for malignant neoplasm of cervix      History of oophorectomy, unilateral       38 year old Female Pelvic and Breast Exam    PLAN:   Orders Placed This Encounter   Procedures     Pelvic and Breast Exam Procedure []     Pap Smear Exam []     Pap imaged thin layer screen with HPV - recommended age 30 - 65 years (select HPV order below)     HPV High Risk Types DNA Cervical     Anti-Mullerian hormone     Follicle Stimulating Hormone     Estradiol     Follow-up at next convenient time to review " labs and records from past ovarian surgery.    Check with insurance re coverage of Gardasil-9    Return in one year/PRN for preventive care or problems/concerns.     Verbalized understanding and agreement with visit plan.    Catrina Edge MD

## 2019-06-28 NOTE — LETTER
2019       RE: Katty Mendoza   [address]  [facility] [address]     Dear Colleague,    Thank you for referring your patient, Katty Mendoza, to the [facility] at [facility]. Please see a copy of my visit note below.      Progress Note    SUBJECTIVE:  Katty Mendoza is an 38 year old  , who requests a breast and pelvic exam.    Patient is followed by Dr. Jerzy Rios for primary care.    Concerns today include: Patient is concerned about HPV risk given history of HPV positive pap in past.  Desires yearly pap testing.  Wonders about getting new Gardisil vaccine (had initially available vaccine at age 27) and will check with insurance.    Patient had bilateral ovarian surgery at age 23 with Dr. Villalta.  Is concerned about how much ovarian tissue was removed and if she is at risk for early menopause.  No symptoms of menopause now and regular monthly menses.  Will request records (not available in Epic) and check labs to assess ovarian reserve.    Has colonoscopy scheduled for excision of sessile polyp.    Menstrual History:  Menstrual History 6/15/2016 2017 2018 2018 2019   LAST MENSTRUAL PERIOD - 2017 - 2019   Menarche Age - - - - -   Period Cycle (Days) 24-28 - - 28 -   Period Duration (Days) 5 - - 3 -   Method of Contraception Condoms - - None -   Period Pattern Regular - - Regular -   Menstrual Flow Moderate - - Moderate -   Menstrual Control - - - - -   Dysmenorrhea - - - - -   Reviewed Today Yes - - Yes -       Last    Lab Results   Component Value Date    PAP NIL 2018     History of abnormal Pap smear: history of other HPV positive pap, 2 negative HPV/NIL paps since.    Last   Lab Results   Component Value Date    HPV16 Negative 2018     Last   Lab Results   Component Value Date    HPV18 Negative 2018     Last   Lab Results   Component Value Date    HRHPV Negative  2018       Mammogram current: not applicable    HISTORY:  Prescription Medications as of 2019       Rx Number Disp Refills Start End Last Dispensed Date Next Fill Date Owning Pharmacy    adapalene (DIFFERIN) 0.1 % cream  45 g 11 2018    [facility] [address] [place] [address]    Sig: At bedtime    Class: E-Prescribe    cholecalciferol (VITAMIN  -D) 1000 UNITS capsule  360 capsule 1 2017    [facility] [address] [place] [address]    Sig: Take 4 capsules (4,000 Units) by mouth daily Take one capsule daily.    Class: E-Prescribe    Route: Oral        No Known Allergies  Immunization History   Administered Date(s) Administered     HPV 2007, 2007, 10/23/2008     HepB 2004, 2005     Influenza (IIV3) PF 2005, 2005, 10/23/2008     TD (ADULT, 7+) 1996, 2002, 2005     Tdap (Adacel,Boostrix) 2009       OB History    Para Term  AB Living   0 0 0 0 0 0   SAB TAB Ectopic Multiple Live Births   0 0 0 0 0     Past Medical History:   Diagnosis Date     Anxiety      Colon polyps      Past Surgical History:   Procedure Laterality Date     BREAST BIOPSY, RT/LT Right 2008    times 2; fibroadenoma     BREAST SURGERY  ,     COLONOSCOPY N/A 2019    Procedure: COMBINED COLONOSCOPY, SINGLE OR MULTIPLE BIOPSY/POLYPECTOMY BY BIOPSY;  Surgeon: Juan Miguel Guan MD;  [facility]     GYN SURGERY       OOPHORECTOMY Right     complete Dr. Villalta     OOPHORECTOMY Left     partial left  Dr. Villalta     Family History   Problem Relation Age of Onset     Cancer Paternal Grandmother         melanoma     Melanoma Paternal Grandmother      Lipids Father      Osteoporosis Maternal Grandmother      Cerebrovascular Disease Maternal Grandfather      Asthma No family hx of      C.A.D. No family hx of      Colon Cancer No family hx of      Inflammatory Bowel Disease No  family hx of      Social History     Socioeconomic History     Marital status: Single     Spouse name: None     Number of children: None     Years of education: None     Highest education level: None   Occupational History     Occupation: marketing and communications     Employer: 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   Social Needs     Financial resource strain: None     Food insecurity:     Worry: None     Inability: None     Transportation needs:     Medical: None     Non-medical: None   Tobacco Use     Smoking status: Never Smoker     Smokeless tobacco: Never Used   Substance and Sexual Activity     Alcohol use: Yes     Alcohol/week: 0.5 - 1.0 oz     Drug use: No     Sexual activity: Not Currently     Partners: Male     Birth control/protection: Condom, None   Lifestyle     Physical activity:     Days per week: None     Minutes per session: None     Stress: None   Relationships     Social connections:     Talks on phone: None     Gets together: None     Attends Caodaism service: None     Active member of club or organization: None     Attends meetings of clubs or organizations: None     Relationship status: None     Intimate partner violence:     Fear of current or ex partner: None     Emotionally abused: None     Physically abused: None     Forced sexual activity: None   Other Topics Concern     Parent/sibling w/ CABG, MI or angioplasty before 65F 55M? Not Asked      Service No     Blood Transfusions No     Caffeine Concern No     Occupational Exposure No     Hobby Hazards No     Sleep Concern No     Stress Concern Yes     Comment: needs alone time     Weight Concern No     Special Diet No     Back Care No     Exercise Yes     Bike Helmet Yes     Seat Belt Yes     Self-Exams Yes   Social History Narrative    How much exercise per week? Runs 3-4 times per week    How much calcium per day? Some, milk and yogurt       How much caffeine per day? 1-2 cups per day    How  "much vitamin D per day? None    Do you/your family wear seatbelts?  Yes    Do you/your family use safety helmets? Yes    Do you/your family use sunscreen? Yes    Do you/your family keep firearms in the home? No    Do you/your family have a smoke detector(s)? Yes        Do you feel safe in your home? Yes    Has anyone ever touched you in an unwanted manner? No     Explain          Maida 10, 2015 Estela Rodriguez LPN                    5/14/14        [place] in [place] Enjoys running and road biking.     Dating, heterosexual.     Megan Uriostegui MD           ROS    EXAM:  Blood pressure 122/79, pulse 71, height 1.778 m (5' 10\"), weight 90.3 kg (199 lb), last menstrual period 06/20/2019, not currently breastfeeding. Body mass index is 28.55 kg/m .  General appearance: Pleasant female in no acute distress.     BREAST EXAM:  Breast: Without visible skin changes. No dimpling or lesions seen.   Breasts supple, non-tender with palpation, no dominant mass, nodularity, or nipple discharge noted bilaterally. Axillary nodes negative.      PELVIC EXAM:  EG/BUS: Normal genital architecture without lesions, erythema or abnormal secretions Bartholin's, Urethra, [place]'s normal   Urethral meatus: normal    Urethra: no masses, tenderness, or scarring    Bladder: no masses or tenderness    Vagina: moist, pink, rugae with creamy, white and odorless  secretions  Cervix: no lesions and pink, moist, closed, without lesion or CMT  Uterus: anteverted,  and small, smooth, firm, mobile w/o pain  Adnexa: Within normal limits and No masses, nodularity, tenderness  Rectum:anus normal       ASSESSMENT:  Encounter Diagnoses   Name Primary?     Encounter for gynecological examination without abnormal finding Yes     Screening for malignant neoplasm of cervix      History of oophorectomy, unilateral       38 year old Female Pelvic and Breast Exam    PLAN:   Orders Placed This Encounter   Procedures     Pelvic and Breast " Exam Procedure []     Pap Smear Exam []     Pap imaged thin layer screen with HPV - recommended age 30 - 65 years (select HPV order below)     HPV High Risk Types DNA Cervical     Anti-Mullerian hormone     Follicle Stimulating Hormone     Estradiol     Follow-up at next convenient time to review labs and records from past ovarian surgery.    Check with insurance re coverage of Gardasil-9    Return in one year/PRN for preventive care or problems/concerns.     Verbalized understanding and agreement with visit plan.    Catrina Edge MD

## 2019-06-28 NOTE — TELEPHONE ENCOUNTER
----- Message from Catrina Edge MD sent at 6/28/2019  8:47 AM CDT -----  Patient had surgery in 2002 at the [facility] with Dr. Villalta (GYN Onc).  These records are not in [facility] and patient would like to review. Can you request the op note and pathology reports from this surgery?  Thank you. Catrina Edge

## 2019-06-28 NOTE — TELEPHONE ENCOUNTER
Called patient to inform her records can be pulled from HIM record release. Reached voicemail, left message with # for HIM record release. Advised to call if any further assistance needed.

## 2019-06-29 ASSESSMENT — ANXIETY QUESTIONNAIRES: GAD7 TOTAL SCORE: 0

## 2019-06-30 LAB — MIS SERPL-MCNC: 2.23 NG/ML (ref 0.18–11.71)

## 2019-07-01 LAB — LAB SCANNED RESULT: NORMAL

## 2019-07-03 LAB
COPATH REPORT: NORMAL
PAP: NORMAL

## 2019-07-09 ENCOUNTER — MYC MEDICAL ADVICE (OUTPATIENT)
Dept: OBGYN | Facility: CLINIC | Age: 39
End: 2019-07-09

## 2019-07-12 DIAGNOSIS — Z90.721 HISTORY OF OOPHORECTOMY, UNILATERAL: ICD-10-CM

## 2019-07-12 LAB
ESTRADIOL SERPL-MCNC: 144 PG/ML
FSH SERPL-ACNC: 3.7 IU/L

## 2019-07-12 PROCEDURE — 82670 ASSAY OF TOTAL ESTRADIOL: CPT | Performed by: OBSTETRICS & GYNECOLOGY

## 2019-07-12 PROCEDURE — 83001 ASSAY OF GONADOTROPIN (FSH): CPT | Performed by: OBSTETRICS & GYNECOLOGY

## 2019-07-12 PROCEDURE — 36415 COLL VENOUS BLD VENIPUNCTURE: CPT | Performed by: OBSTETRICS & GYNECOLOGY

## 2019-07-16 ENCOUNTER — TELEPHONE (OUTPATIENT)
Dept: GASTROENTEROLOGY | Facility: CLINIC | Age: 39
End: 2019-07-16

## 2019-07-16 DIAGNOSIS — Z12.11 SPECIAL SCREENING FOR MALIGNANT NEOPLASMS, COLON: Primary | ICD-10-CM

## 2019-07-16 RX ORDER — BISACODYL 5 MG/1
TABLET, DELAYED RELEASE ORAL
Qty: 2 TABLET | Refills: 0 | Status: SHIPPED | OUTPATIENT
Start: 2019-07-16 | End: 2019-08-22

## 2019-07-16 NOTE — TELEPHONE ENCOUNTER
Patient Name: Katty Mendoza   : 1980  MRN: [id number]       : [] N/A   [] Yes:  Language  /  ID:      VM with request pt contact Endoscopy Pre-assessment RN to review upcoming procedure information.  Telephone call-back number provided.      Additional Information regarding appointment:    Scheduled for Colonoscopy with Dr. Guan    [x][facility] OR - [address] [facility], 3rd Floor Surgery check-in      Prep Type:   [x]Golytely eRx: Myriam Houser, [place];  [] MoviPrep:  , [] MiraLax:  , [] Other:        Additional Information: Patient requested prescription for procedure in the OR    _______________________________________________      Instructions given: [] Rec'd & Read   [x] Reviewed       [x] Resent via goodideazs - 19   [] Resent via eMail -       Bridgett Mccormack RN  [facility]

## 2019-07-18 ENCOUNTER — ANESTHESIA EVENT (OUTPATIENT)
Dept: SURGERY | Facility: CLINIC | Age: 39
End: 2019-07-18
Payer: COMMERCIAL

## 2019-07-18 ENCOUNTER — ANESTHESIA (OUTPATIENT)
Dept: SURGERY | Facility: CLINIC | Age: 39
End: 2019-07-18
Payer: COMMERCIAL

## 2019-07-18 ENCOUNTER — HOSPITAL ENCOUNTER (OUTPATIENT)
Facility: CLINIC | Age: 39
Discharge: HOME | End: 2019-07-18
Attending: INTERNAL MEDICINE | Admitting: INTERNAL MEDICINE
Payer: COMMERCIAL

## 2019-07-18 VITALS
TEMPERATURE: 98 F | RESPIRATION RATE: 16 BRPM | SYSTOLIC BLOOD PRESSURE: 119 MMHG | WEIGHT: 199.3 LBS | BODY MASS INDEX: 28.53 KG/M2 | DIASTOLIC BLOOD PRESSURE: 83 MMHG | HEART RATE: 59 BPM | OXYGEN SATURATION: 99 % | HEIGHT: 70 IN

## 2019-07-18 DIAGNOSIS — Z86.0100 HISTORY OF COLONIC POLYPS: Primary | ICD-10-CM

## 2019-07-18 LAB
COLONOSCOPY: NORMAL
GLUCOSE BLDC GLUCOMTR-MCNC: 106 MG/DL (ref 70–99)
HCG UR QL: NEGATIVE

## 2019-07-18 PROCEDURE — 25800030 ZZH RX IP 258 OP 636: Performed by: ANESTHESIOLOGY

## 2019-07-18 PROCEDURE — 81025 URINE PREGNANCY TEST: CPT | Performed by: ANESTHESIOLOGY

## 2019-07-18 PROCEDURE — 40000170 ZZH STATISTIC PRE-PROCEDURE ASSESSMENT II: Performed by: INTERNAL MEDICINE

## 2019-07-18 PROCEDURE — 37000009 ZZH ANESTHESIA TECHNICAL FEE, EACH ADDTL 15 MIN: Performed by: INTERNAL MEDICINE

## 2019-07-18 PROCEDURE — 27210794 ZZH OR GENERAL SUPPLY STERILE: Performed by: INTERNAL MEDICINE

## 2019-07-18 PROCEDURE — 88305 TISSUE EXAM BY PATHOLOGIST: CPT | Performed by: INTERNAL MEDICINE

## 2019-07-18 PROCEDURE — 25000125 ZZHC RX 250: Performed by: INTERNAL MEDICINE

## 2019-07-18 PROCEDURE — 71000027 ZZH RECOVERY PHASE 2 EACH 15 MINS: Performed by: INTERNAL MEDICINE

## 2019-07-18 PROCEDURE — 36000053 ZZH SURGERY LEVEL 2 EA 15 ADDTL MIN - UMMC: Performed by: INTERNAL MEDICINE

## 2019-07-18 PROCEDURE — 25000125 ZZHC RX 250: Performed by: NURSE ANESTHETIST, CERTIFIED REGISTERED

## 2019-07-18 PROCEDURE — 37000008 ZZH ANESTHESIA TECHNICAL FEE, 1ST 30 MIN: Performed by: INTERNAL MEDICINE

## 2019-07-18 PROCEDURE — 36000051 ZZH SURGERY LEVEL 2 1ST 30 MIN - UMMC: Performed by: INTERNAL MEDICINE

## 2019-07-18 PROCEDURE — 82962 GLUCOSE BLOOD TEST: CPT

## 2019-07-18 PROCEDURE — 25000128 H RX IP 250 OP 636: Performed by: NURSE ANESTHETIST, CERTIFIED REGISTERED

## 2019-07-18 PROCEDURE — 25800030 ZZH RX IP 258 OP 636: Performed by: NURSE ANESTHETIST, CERTIFIED REGISTERED

## 2019-07-18 RX ORDER — LABETALOL 20 MG/4 ML (5 MG/ML) INTRAVENOUS SYRINGE
10
Status: DISCONTINUED | OUTPATIENT
Start: 2019-07-18 | End: 2019-07-18 | Stop reason: HOSPADM

## 2019-07-18 RX ORDER — FENTANYL CITRATE 50 UG/ML
25-50 INJECTION, SOLUTION INTRAMUSCULAR; INTRAVENOUS
Status: DISCONTINUED | OUTPATIENT
Start: 2019-07-18 | End: 2019-07-18 | Stop reason: HOSPADM

## 2019-07-18 RX ORDER — ONDANSETRON 2 MG/ML
4 INJECTION INTRAMUSCULAR; INTRAVENOUS EVERY 30 MIN PRN
Status: DISCONTINUED | OUTPATIENT
Start: 2019-07-18 | End: 2019-07-18 | Stop reason: HOSPADM

## 2019-07-18 RX ORDER — NALOXONE HYDROCHLORIDE 0.4 MG/ML
.1-.4 INJECTION, SOLUTION INTRAMUSCULAR; INTRAVENOUS; SUBCUTANEOUS
Status: DISCONTINUED | OUTPATIENT
Start: 2019-07-18 | End: 2019-07-18 | Stop reason: HOSPADM

## 2019-07-18 RX ORDER — LIDOCAINE 40 MG/G
CREAM TOPICAL
Status: DISCONTINUED | OUTPATIENT
Start: 2019-07-18 | End: 2019-07-18 | Stop reason: HOSPADM

## 2019-07-18 RX ORDER — SODIUM CHLORIDE, SODIUM LACTATE, POTASSIUM CHLORIDE, CALCIUM CHLORIDE 600; 310; 30; 20 MG/100ML; MG/100ML; MG/100ML; MG/100ML
INJECTION, SOLUTION INTRAVENOUS CONTINUOUS
Status: DISCONTINUED | OUTPATIENT
Start: 2019-07-18 | End: 2019-07-18 | Stop reason: HOSPADM

## 2019-07-18 RX ORDER — HYDROMORPHONE HYDROCHLORIDE 1 MG/ML
.3-.5 INJECTION, SOLUTION INTRAMUSCULAR; INTRAVENOUS; SUBCUTANEOUS EVERY 10 MIN PRN
Status: DISCONTINUED | OUTPATIENT
Start: 2019-07-18 | End: 2019-07-18 | Stop reason: HOSPADM

## 2019-07-18 RX ORDER — FENTANYL CITRATE 50 UG/ML
INJECTION, SOLUTION INTRAMUSCULAR; INTRAVENOUS PRN
Status: DISCONTINUED | OUTPATIENT
Start: 2019-07-18 | End: 2019-07-18

## 2019-07-18 RX ORDER — FLUMAZENIL 0.1 MG/ML
0.2 INJECTION, SOLUTION INTRAVENOUS
Status: DISCONTINUED | OUTPATIENT
Start: 2019-07-18 | End: 2019-07-18 | Stop reason: HOSPADM

## 2019-07-18 RX ORDER — ALBUTEROL SULFATE 0.83 MG/ML
2.5 SOLUTION RESPIRATORY (INHALATION) EVERY 4 HOURS PRN
Status: DISCONTINUED | OUTPATIENT
Start: 2019-07-18 | End: 2019-07-18 | Stop reason: HOSPADM

## 2019-07-18 RX ORDER — ONDANSETRON 2 MG/ML
4 INJECTION INTRAMUSCULAR; INTRAVENOUS EVERY 6 HOURS PRN
Status: DISCONTINUED | OUTPATIENT
Start: 2019-07-18 | End: 2019-07-18 | Stop reason: HOSPADM

## 2019-07-18 RX ORDER — LIDOCAINE HYDROCHLORIDE 20 MG/ML
INJECTION, SOLUTION INFILTRATION; PERINEURAL PRN
Status: DISCONTINUED | OUTPATIENT
Start: 2019-07-18 | End: 2019-07-18

## 2019-07-18 RX ORDER — ONDANSETRON 2 MG/ML
4 INJECTION INTRAMUSCULAR; INTRAVENOUS
Status: DISCONTINUED | OUTPATIENT
Start: 2019-07-18 | End: 2019-07-18 | Stop reason: HOSPADM

## 2019-07-18 RX ORDER — ONDANSETRON 2 MG/ML
INJECTION INTRAMUSCULAR; INTRAVENOUS PRN
Status: DISCONTINUED | OUTPATIENT
Start: 2019-07-18 | End: 2019-07-18

## 2019-07-18 RX ORDER — ONDANSETRON 4 MG/1
4 TABLET, ORALLY DISINTEGRATING ORAL EVERY 30 MIN PRN
Status: DISCONTINUED | OUTPATIENT
Start: 2019-07-18 | End: 2019-07-18 | Stop reason: HOSPADM

## 2019-07-18 RX ORDER — GLYCOPYRROLATE 0.2 MG/ML
INJECTION, SOLUTION INTRAMUSCULAR; INTRAVENOUS PRN
Status: DISCONTINUED | OUTPATIENT
Start: 2019-07-18 | End: 2019-07-18

## 2019-07-18 RX ORDER — PROPOFOL 10 MG/ML
INJECTION, EMULSION INTRAVENOUS PRN
Status: DISCONTINUED | OUTPATIENT
Start: 2019-07-18 | End: 2019-07-18

## 2019-07-18 RX ORDER — MEPERIDINE HYDROCHLORIDE 50 MG/ML
12.5 INJECTION INTRAMUSCULAR; INTRAVENOUS; SUBCUTANEOUS
Status: DISCONTINUED | OUTPATIENT
Start: 2019-07-18 | End: 2019-07-18 | Stop reason: HOSPADM

## 2019-07-18 RX ORDER — PROPOFOL 10 MG/ML
INJECTION, EMULSION INTRAVENOUS CONTINUOUS PRN
Status: DISCONTINUED | OUTPATIENT
Start: 2019-07-18 | End: 2019-07-18

## 2019-07-18 RX ORDER — ONDANSETRON 4 MG/1
4 TABLET, ORALLY DISINTEGRATING ORAL EVERY 6 HOURS PRN
Status: DISCONTINUED | OUTPATIENT
Start: 2019-07-18 | End: 2019-07-18 | Stop reason: HOSPADM

## 2019-07-18 RX ORDER — EPHEDRINE SULFATE 50 MG/ML
INJECTION, SOLUTION INTRAMUSCULAR; INTRAVENOUS; SUBCUTANEOUS PRN
Status: DISCONTINUED | OUTPATIENT
Start: 2019-07-18 | End: 2019-07-18

## 2019-07-18 RX ADMIN — GLYCOPYRROLATE 0.2 MG: 0.2 INJECTION, SOLUTION INTRAMUSCULAR; INTRAVENOUS at 07:57

## 2019-07-18 RX ADMIN — PHENYLEPHRINE HYDROCHLORIDE 100 MCG: 10 INJECTION INTRAVENOUS at 08:01

## 2019-07-18 RX ADMIN — LIDOCAINE HYDROCHLORIDE 60 MG: 20 INJECTION, SOLUTION INFILTRATION; PERINEURAL at 07:30

## 2019-07-18 RX ADMIN — PHENYLEPHRINE HYDROCHLORIDE 100 MCG: 10 INJECTION INTRAVENOUS at 08:19

## 2019-07-18 RX ADMIN — PROPOFOL 20 MG: 10 INJECTION, EMULSION INTRAVENOUS at 07:44

## 2019-07-18 RX ADMIN — SODIUM CHLORIDE, POTASSIUM CHLORIDE, SODIUM LACTATE AND CALCIUM CHLORIDE: 600; 310; 30; 20 INJECTION, SOLUTION INTRAVENOUS at 07:21

## 2019-07-18 RX ADMIN — PHENYLEPHRINE HYDROCHLORIDE 100 MCG: 10 INJECTION INTRAVENOUS at 08:10

## 2019-07-18 RX ADMIN — Medication 5 MG: at 08:01

## 2019-07-18 RX ADMIN — ONDANSETRON 4 MG: 2 INJECTION INTRAMUSCULAR; INTRAVENOUS at 08:18

## 2019-07-18 RX ADMIN — FENTANYL CITRATE 50 MCG: 50 INJECTION, SOLUTION INTRAMUSCULAR; INTRAVENOUS at 07:40

## 2019-07-18 RX ADMIN — MIDAZOLAM 2 MG: 1 INJECTION INTRAMUSCULAR; INTRAVENOUS at 07:21

## 2019-07-18 RX ADMIN — Medication 5 MG: at 08:02

## 2019-07-18 RX ADMIN — PROPOFOL 75 MCG/KG/MIN: 10 INJECTION, EMULSION INTRAVENOUS at 07:30

## 2019-07-18 RX ADMIN — Medication 5 MG: at 08:19

## 2019-07-18 RX ADMIN — Medication 5 MG: at 08:10

## 2019-07-18 ASSESSMENT — MIFFLIN-ST. JEOR: SCORE: 1669.25

## 2019-07-18 NOTE — OP NOTE
COLONOSCOPY 07/18/2019  7:09 AM [facility], [address] [place] [address] [phone]     Endoscopy Department   _______________________________________________________________________________   Patient Name: Katty Mendoza             Procedure Date: 7/18/2019 7:09 AM   MRN: [id number]                       Account Number: [id number]   YOB: 1980             Admit Type: Outpatient   Age: 38                                Gender: Female   Note Status: Finalized                Attending MD: Juan Miguel Guan MD   Pause for the Cause: time out performed Total Sedation Time:   _______________________________________________________________________________       Procedure:           Colonoscopy   Indications:         Therapeutic procedure for colon polyps; ascending colon                        polyp identified on colonoscopy from 4/22/19 among                        several other advanced adenomas that were resected.;                        Screen for genetic predisposition negative   Providers:           Juan Miguel Guan MD   Referring MD:           Requesting Provider: Cheri Bertrand   Medicines:           Monitored Anesthesia Care   Complications:       No immediate complications. Estimated blood loss:                        Minimal.   _______________________________________________________________________________   Procedure:           Pre-Anesthesia Assessment:                        - Prior to the procedure, a History and Physical was                        performed, and patient medications and allergies were                        reviewed. The patient is competent. The risks and                        benefits of the procedure and the sedation options and                        risks were discussed with the patient. All questions                        were answered and informed consent was obtained. Patient                        identification and proposed  procedure were verified by                        the physician, the nurse, the anesthesiologist and the                        anesthetist in the procedure room. Mental Status                        Examination: alert and oriented. Airway Examination:                        normal oropharyngeal airway and neck mobility.                        Respiratory Examination: clear to auscultation. CV                        Examination: normal. Prophylactic Antibiotics: The                        patient does not require prophylactic antibiotics. Prior                        Anticoagulants: The patient has taken no previous                        anticoagulant or antiplatelet agents. ASA Grade                        Assessment: I - A normal, healthy patient. After                        reviewing the risks and benefits, the patient was deemed                        in satisfactory condition to undergo the procedure. The                        anesthesia plan was to use monitored anesthesia care                        (MAC). Immediately prior to administration of                        medications, the patient was re-assessed for adequacy to                        receive sedatives. The heart rate, respiratory rate,                        oxygen saturations, blood pressure, adequacy of                        pulmonary ventilation, and response to care were                        monitored throughout the procedure. The physical status                        of the patient was re-assessed after the procedure.                        After obtaining informed consent, the colonoscope was                        passed under direct vision. Throughout the procedure,                        the patient's blood pressure, pulse, and oxygen                        saturations were monitored continuously. The Colonoscope                        was introduced through the anus and advanced to the                        cecum, identified by  appendiceal orifice and ileocecal                        valve. The colonoscopy was performed without difficulty.                        The patient tolerated the procedure well. The quality of                        the bowel preparation was evaluated using the BBPS                        (Durand Bowel Preparation Scale) with scores of: Right                        Colon = 3, Transverse Colon = 3 and Left Colon = 3                        (entire mucosa seen well with no residual staining,                        small fragments of stool or opaque liquid). The total                        BBPS score equals 9.                                                                                     Findings:        Hemorrhoids were found on perianal exam.        A subtle 25 mm polyp was found in the ascending colon adjacent to prior        EMR scar from adjacent polyp. The polyp was non-granular lateral        spreading, nano IIa, NICE type 1. Preparations were made for mucosal        resection. Orise gel was injected to raise the lesion. Piecemeal mucosal        resection using a snare was performed. Resection and retrieval were        complete. Margins of the EMR site treated with snare tip ablation. To        prevent bleeding after mucosal resection, six hemostatic clips were        successfully placed (MR conditional). There was no bleeding at the end        of the procedure.        A tattoo was seen in the sigmoid colon. A post-polypectomy scar was        found at the tattoo site. There was no evidence of residual polyp tissue.                                                                                     Impression:          - Hemorrhoids found on perianal exam.                        - One 25 mm polyp in the ascending colon, removed with                        mucosal resection. Resected and retrieved. Snare tip                        ablation of EMR margins performed. Clips (MR                         conditional) were placed.                        - A tattoo was seen in the sigmoid colon. A                        post-polypectomy scar was found at the tattoo site.                        There was no evidence of residual polyp tissue.   Recommendation:      - Discharge patient to home (ambulatory).                        - Await pathology results.                        - Repeat colonoscopy in 6 months for surveillance after                        piecemeal polypectomy.                                                                                       Juan Miguel Guan MD   ________________   Juan Miguel Guan MD

## 2019-07-18 NOTE — ANESTHESIA CARE TRANSFER NOTE
Patient: Katty Mendoza    Procedure(s):  Colonoscopy With Endoscopic Mucosal Resection, polypectomy and endoclips for hemostasis    Diagnosis: Colon Polyposis  Diagnosis Additional Information: No value filed.    Anesthesia Type:   No value filed.     Note:  Airway :Room Air  Patient transferred to:Phase II  Handoff Report: Identifed the Patient, Identified the Reponsible Provider, Reviewed the pertinent medical history, Discussed the surgical course, Reviewed Intra-OP anesthesia mangement and issues during anesthesia, Set expectations for post-procedure period and Allowed opportunity for questions and acknowledgement of understanding      Vitals: (Last set prior to Anesthesia Care Transfer)    CRNA VITALS  7/18/2019 0804 - 7/18/2019 0846      7/18/2019             Pulse:  63    SpO2:  100 %                Electronically Signed By: GREGORIO Barker CRNA  July 18, 2019  8:46 AM

## 2019-07-18 NOTE — ANESTHESIA POSTPROCEDURE EVALUATION
Anesthesia POST Procedure Evaluation    Patient: Katty Mendoza   MRN:     [id number] Gender:   female   Age:    38 year old :      1980        Preoperative Diagnosis: Colon Polyposis   Procedure(s):  Colonoscopy With Endoscopic Mucosal Resection, polypectomy and endoclips for hemostasis   Postop Comments: No value filed.       Anesthesia Type:  MAC  No value filed.    Reportable Event: NO     PAIN: Uncomplicated   Sign Out status: Comfortable, Well controlled pain     PONV: No PONV   Sign Out status:  No Nausea or Vomiting     Neuro/Psych: Uneventful perioperative course   Sign Out Status: Preoperative baseline; Age appropriate mentation     Airway/Resp.: Uneventful perioperative course   Sign Out Status: Non labored breathing, age appropriate RR; Resp. Status within EXPECTED Parameters     CV: Uneventful perioperative course   Sign Out status: Appropriate BP and perfusion indices; Appropriate HR/Rhythm     Disposition:   Sign Out in:  Phase II  Recovery Course: Uneventful  Follow-Up: Not required     Comments/Narrative:  No noted anesthetic complications.  Patient satisfied with anesthetic.             Last Anesthesia Record Vitals:  CRNA VITALS  2019 0804 - 2019 0904      2019             Pulse:  63    SpO2:  100 %          Last PACU Vitals:  Vitals Value Taken Time   /57 2019  8:40 AM   Temp 36.7  C (98  F) 2019  8:40 AM   Pulse 59 2019  8:40 AM   Resp 16 2019  8:40 AM   SpO2 98 % 2019  8:40 AM   Temp src     NIBP 97/52 2019  8:25 AM   Pulse 63 2019  8:31 AM   SpO2 100 % 2019  8:31 AM   Resp     Temp     Ht Rate 71 2019  8:30 AM   Temp 2 35.7  C (96.3  F) 2019  8:29 AM         Electronically Signed By: Kem Hurtado MD, 2019, 9:40 AM

## 2019-07-18 NOTE — ANESTHESIA PREPROCEDURE EVALUATION
Anesthesia Pre-Procedure Evaluation    Patient: Katty Mendoza   MRN:     [id number] Gender:   female   Age:    38 year old :      1980        Preoperative Diagnosis: Colon Polyposis   Procedure(s):  Colonoscopy With Endoscopic Mucosal Resection     Past Medical History:   Diagnosis Date     Anxiety      Colon polyps       Past Surgical History:   Procedure Laterality Date     BREAST BIOPSY, RT/LT Right 2008    times 2; fibroadenoma     BREAST SURGERY  ,2013     COLONOSCOPY N/A 2019    Procedure: COMBINED COLONOSCOPY, SINGLE OR MULTIPLE BIOPSY/POLYPECTOMY BY BIOPSY;  Surgeon: Juan Miguel Guan MD;  Location: [facility] OR     GYN SURGERY       OOPHORECTOMY Right     complete Dr. Villalta     OOPHORECTOMY Left     partial left  Dr. Villalta          Anesthesia Evaluation     .             ROS/MED HX    ENT/Pulmonary:  - neg pulmonary ROS     Neurologic:       Cardiovascular:  - neg cardiovascular ROS       METS/Exercise Tolerance:  >4 METS   Hematologic:         Musculoskeletal:         GI/Hepatic:        (-) GERD   Renal/Genitourinary:         Endo:         Psychiatric:         Infectious Disease:         Malignancy:         Other:                         PHYSICAL EXAM:   Mental Status/Neuro: A/A/O   Airway: Facies: Feasible  Mallampati: II  Mouth/Opening: Full  TM distance: > 6 cm  Neck ROM: Full   Respiratory: Auscultation: CTAB     Resp. Rate: Normal     Resp. Effort: Normal      CV: Rhythm: Regular  Rate: Age appropriate  Heart: Normal Sounds   Comments:      Dental: Normal                  Lab Results   Component Value Date    WBC 5.8 03/15/2019    HGB 13.8 03/15/2019    HCT 41.5 03/15/2019     03/15/2019    GLC 87 2018    TSH 2.51 03/15/2019    HCG Negative 2019       Preop Vitals  BP Readings from Last 3 Encounters:   19 112/89   19 122/79   19 111/70    Pulse Readings from Last 3 Encounters:   19 82   19 71   19 83      Resp Readings from  "Last 3 Encounters:   07/18/19 18   06/21/19 18   04/22/19 16    SpO2 Readings from Last 3 Encounters:   07/18/19 98%   04/22/19 99%      Temp Readings from Last 1 Encounters:   07/18/19 36.9  C (98.4  F) (Oral)    Ht Readings from Last 1 Encounters:   07/18/19 1.778 m (5' 10\")      Wt Readings from Last 1 Encounters:   07/18/19 90.4 kg (199 lb 4.7 oz)    Estimated body mass index is 28.6 kg/m  as calculated from the following:    Height as of this encounter: 1.778 m (5' 10\").    Weight as of this encounter: 90.4 kg (199 lb 4.7 oz).     LDA:            Assessment:   ASA SCORE: 1    NPO Status: > 6 hours since completed Solid Foods   Documentation: H&P complete; Preop Testing complete   Proceeding: Proceed without further delay  Tobacco Use:  NO Active use of Tobacco/UNKNOWN Tobacco use status     Plan:   Anes. Type:  MAC   Pre-Induction: Midazolam IV   Induction:  IV (Standard)   Airway: Native Airway   Access/Monitoring: PIV   Maintenance: Propofol; IV (vs. General - gastroenterologist preference)   Emergence: Procedure Site   Logistics: Same Day Surgery     Postop Pain/Sedation Strategy:  Standard-Options: Opioids PRN     PONV Management:  Adult Risk Factors: Female, Non-Smoker, Postop Opioids  Prevention: Propofol Infusion     CONSENT: Direct conversation   Plan and risks discussed with: Patient          Comments for Plan/Consent:  ______________________________________________________________________  I discussed the risks and benefits of general anesthesia with the patient.  Questions were sought and answered.      Kem Hurtado MD  Attending Anesthesiologist                           Kem Hurtado MD  "

## 2019-07-18 NOTE — DISCHARGE INSTRUCTIONS
[facility]  Same-Day Surgery   Adult Discharge Orders & Instructions     For 24 hours after surgery    1. Get plenty of rest.  A responsible adult must stay with you for at least 24 hours after you leave the hospital.   2. Do not drive or use heavy equipment.  If you have weakness or tingling, don't drive or use heavy equipment until this feeling goes away.  3. Do not drink alcohol.  4. Avoid strenuous or risky activities.  Ask for help when climbing stairs.   5. You may feel lightheaded.  IF so, sit for a few minutes before standing.  Have someone help you get up.   6. If you have nausea (feel sick to your stomach): Drink only clear liquids such as apple juice, ginger ale, broth or 7-Up.  Rest may also help.  Be sure to drink enough fluids.  Move to a regular diet as you feel able.  7. You may have a slight fever. Call the doctor if your fever is over 100 F (37.7 C) (taken under the tongue) or lasts longer than 24 hours.  8. You may have a dry mouth, a sore throat, muscle aches or trouble sleeping.  These should go away after 24 hours.  9. Do not make important or legal decisions.   Call your doctor for any of the followin.  Signs of infection (fever, growing tenderness at the surgery site, a large amount of drainage or bleeding, severe pain, foul-smelling drainage, redness, swelling).    2. It has been over 8 to 10 hours since surgery and you are still not able to urinate (pass water).    3.  Headache for over 24 hours.    4.  Numbness, tingling or weakness the day after surgery (if you had spinal anesthesia).  To contact a doctor, call Dr. Guan [phone]      or:        [phone] and ask for the resident on call for          GI Medicine (answered 24 hours a day)      Emergency Department:    [facility]: [phone]       (TTY for hearing impaired: [phone])

## 2019-07-19 LAB — COPATH REPORT: NORMAL

## 2019-07-22 NOTE — RESULT ENCOUNTER NOTE
Pathology from colonoscopy reviewed. One SSA removed. Plan as per colonoscopy report, repeat colonoscopy in 6 months.    Juan Miguel Guan MD  [facility]  Division of Gastroenterology and Hepatology  [facility] [address] [place], [place] [address]

## 2019-08-22 ENCOUNTER — OFFICE VISIT (OUTPATIENT)
Dept: OBGYN | Facility: CLINIC | Age: 39
End: 2019-08-22
Attending: OBSTETRICS & GYNECOLOGY
Payer: COMMERCIAL

## 2019-08-22 VITALS
HEART RATE: 84 BPM | DIASTOLIC BLOOD PRESSURE: 82 MMHG | SYSTOLIC BLOOD PRESSURE: 124 MMHG | WEIGHT: 199 LBS | BODY MASS INDEX: 28.55 KG/M2

## 2019-08-22 DIAGNOSIS — Z23 NEED FOR HPV VACCINATION: ICD-10-CM

## 2019-08-22 DIAGNOSIS — Z90.721 HISTORY OF RIGHT OOPHORECTOMY: Primary | ICD-10-CM

## 2019-08-22 PROCEDURE — 25000581 ZZH RX MED A9270 GY (STAT IND- M) 250: Mod: ZF

## 2019-08-22 PROCEDURE — G0463 HOSPITAL OUTPT CLINIC VISIT: HCPCS | Mod: ZF,25

## 2019-08-22 PROCEDURE — 90471 IMMUNIZATION ADMIN: CPT | Mod: ZF

## 2019-08-22 PROCEDURE — 90651 9VHPV VACCINE 2/3 DOSE IM: CPT | Mod: ZF

## 2019-08-22 ASSESSMENT — ANXIETY QUESTIONNAIRES
7. FEELING AFRAID AS IF SOMETHING AWFUL MIGHT HAPPEN: NOT AT ALL
GAD7 TOTAL SCORE: 0
6. BECOMING EASILY ANNOYED OR IRRITABLE: NOT AT ALL
3. WORRYING TOO MUCH ABOUT DIFFERENT THINGS: NOT AT ALL
5. BEING SO RESTLESS THAT IT IS HARD TO SIT STILL: NOT AT ALL
2. NOT BEING ABLE TO STOP OR CONTROL WORRYING: NOT AT ALL
1. FEELING NERVOUS, ANXIOUS, OR ON EDGE: NOT AT ALL

## 2019-08-22 ASSESSMENT — PATIENT HEALTH QUESTIONNAIRE - PHQ9
5. POOR APPETITE OR OVEREATING: NOT AT ALL
SUM OF ALL RESPONSES TO PHQ QUESTIONS 1-9: 0

## 2019-08-22 ASSESSMENT — PAIN SCALES - GENERAL: PAINLEVEL: NO PAIN (0)

## 2019-08-22 NOTE — NURSING NOTE
Chief Complaint   Patient presents with     RECHECK     Follow-up test results   Skylar Landry LPN

## 2019-08-22 NOTE — PROGRESS NOTES
"[facility]  Gynecology Visit    Reason for Consult: Discuss prior surgery   Consulting Provider: Self    HPI:    Katty Mendoza is a 38 year old , here to discuss results of ovarian surgery in  with Dr. Villalta and recent labs. She is concerned she will go through menopause early since she has only one ovary and is uncertain if the surgeon took the left one as well. She was in her twenties at the time, and does not recall the details and understandably has questions.  She reports regular cycles, no hot flashes, feels well overall. Heterosexual. She is not currently sexually active, but dating. Would like to know if it is possible for her to get pregnant, she thought it was unlikely considering past surgery. Also reports \"many people in my family have twins in their 40s\". Does not want birth control currently, uses condoms. She also would like Gardasil 9, called her insurance and was told it is covered.     GYN History  - LMP: Patient's last menstrual period was 2019.  - Menses: Regular monthly cycles   - Pap Smears: hx of abnl pap other positive HPV two negative HPV/NIL paps since    Lab Results   Component Value Date    PAP NIL 2019    PAP NIL 2018    PAP NIL 2017     - Contraception: not sexually active x 2 months, uses condoms when she is. Declines birth control   - Sexual Activity/Concerns: no concerns  - Hx STIs/UTIs: denies concern    OBHx  OB History    Para Term  AB Living   0 0 0 0 0 0   SAB TAB Ectopic Multiple Live Births   0 0 0 0 0       PMHx:   Past Medical History:   Diagnosis Date     Anxiety      Colon polyps        PSHx:   Past Surgical History:   Procedure Laterality Date     APPENDECTOMY  2002    Dr. Villalta     BREAST BIOPSY, RT/LT Right 2008    times 2; fibroadenoma     BREAST SURGERY  ,     COLONOSCOPY N/A 2019    Procedure: COMBINED COLONOSCOPY, SINGLE OR MULTIPLE BIOPSY/POLYPECTOMY BY BIOPSY;  Surgeon: Juan Miguel Guan MD;  " Location: [facility] OR     left adnexal cystectomy Left 2002    Dr. Villalta     SALPINGO OOPHORECTOMY,R/L/BOBBY Right     complete Dr. Villalta       02 Pelvic US for adnexal mass [id number] multiloculated R ovarian mass, suspicious for mucinous ovarian neoplasm, causing marked R sided hydronephrosis and occupying entire peritoneal cavity.     -2002 Path report:    A. Right Fallopian tube and ovary:      1. benign multilocular ovarian mucinous cystadenoma     2. Fallopian tube without histopathologic changes   B. 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 Mesosalpinx nodule - portion of fimbriated portion of Fallopian tube with benign serous adenofibroma with focal papillary features   D. Appendix - no histopathologic changes   E. Pelvic fluid for cytology      1. Mesothelial cells and blood     2. Negative for malignant cells    Meds:   Current Outpatient Medications   Medication     adapalene (DIFFERIN) 0.1 % cream     cholecalciferol (VITAMIN  -D) 1000 UNITS capsule     No current facility-administered medications for this visit.        Allergies:  No Known Allergies    SocHx: Not discussed     FamHx:  Not discussed     ROS: 10-Point ROS negative except as noted in HPI    Physical Exam  /82   Pulse 84   Wt 90.3 kg (199 lb)   LMP 2019   BMI 28.55 kg/m    Gen: Well-appearing, NAD, tearful at times but consolable   HEENT: Normocephalic, atraumatic  Ext: No LE edema, extremities warm and well perfused    Assessment/Plan:  Katty Mendoza is a 38 year old  female here to discuss prior ovarian surgery and fertility     #Discussion of prior ovarian surgery by Dr. Villalta in   --Per op report on 02 procedure entailed:   1, Exploratory laparotomy   2. Right salpingo-ophrectomy   3. Left adnexal cystectomy with biopsy of left adnexal nodule  4. Appendectomy  --Revised patient's surgical history to reflect the above procedures.   --Explained the surgery and that the left ovary was,  in fact, not removed as she had suspected.  --Discussed the path results at length. Only her Right ovary was removed as that was where the large benign multilocular ovarian mucinous cystadenoma was. Patient was surprised to learn she had her appendix removed, discussed the rationale for this with mucinous tumors. On the left side, only a cystectomy was done of the left adnexa with a biopsy of a left adnexal nodule - explained that this was adjacent to the ovary but not the left  ovary itself. These results were benign, common findings - hyatid of Morgagni and a Mesosalpinx nodule.   --She is appreciative of Dr. Edge's discussion and understands the operation and its sequelae better.     #Ovarian reserve lab results   -Discussed lab results at length and explained that her ovarian reserve is good for her age  -Discussed that based on these results, it is possible that she could conceive   -Offered contraception, patient declines at this time    #Gardasil vaccine  -Start series today, follow up in 4 mo and 6 mo for subsequent doses    Return to clinic prn     Staffed with Dr. Edge, I acted as her scribe for this patient encounter.     Elvira Espinoza MD  Obstetrics and Gyncology, PGY-1  08/22/19 , 7:54 AM      Total visit time was 25 minutes with 25 minutes spent in counseling and coordination of care for prior ovarian surgery, ovarian reserve and HPV vaccine .    Appreciate Dr. Espinoza's note above, patient also seen and examined by me. I agree with the note above.   Catrina Edge MD

## 2019-08-22 NOTE — LETTER
"2019       RE: Katty Mendoza   [address]  [facility] [address]     Dear Colleague,    Thank you for referring your patient, Katty Mendoza, to the [facility] at [facility]. Please see a copy of my visit note below.    [facility]  Gynecology Visit    Reason for Consult: Discuss prior surgery   Consulting Provider: Self    HPI:    Katty Mendoza is a 38 year old , here to discuss results of ovarian surgery in  with Dr. Villalta and recent labs. She is concerned she will go through menopause early since she has only one ovary and is uncertain if the surgeon took the left one as well. She was in her twenties at the time, and does not recall the details and understandably has questions.  She reports regular cycles, no hot flashes, feels well overall. Heterosexual. She is not currently sexually active, but dating. Would like to know if it is possible for her to get pregnant, she thought it was unlikely considering past surgery. Also reports \"many people in my family have twins in their 40s\". Does not want birth control currently, uses condoms. She also would like Gardasil 9, called her insurance and was told it is covered.     GYN History  - LMP: Patient's last menstrual period was 2019.  - Menses: Regular monthly cycles   - Pap Smears: hx of abnl pap other positive HPV two negative HPV/NIL paps since    Lab Results   Component Value Date    PAP NIL 2019    PAP NIL 2018    PAP NIL 2017     - Contraception: not sexually active x 2 months, uses condoms when she is. Declines birth control   - Sexual Activity/Concerns: no concerns  - Hx STIs/UTIs: denies concern    OBHx  OB History    Para Term  AB Living   0 0 0 0 0 0   SAB TAB Ectopic Multiple Live Births   0 0 0 0 0     PMHx:   Past Medical History:   Diagnosis Date     Anxiety      Colon polyps      PSHx:   Past Surgical History:   Procedure " Laterality Date     APPENDECTOMY  2002    Dr. Villalta     BREAST BIOPSY, RT/LT Right 2008    times 2; fibroadenoma     BREAST SURGERY  ,     COLONOSCOPY N/A 2019    Procedure: COMBINED COLONOSCOPY, SINGLE OR MULTIPLE BIOPSY/POLYPECTOMY BY BIOPSY;  Surgeon: Juan Miguel Guan MD;  Location: [facility]     left adnexal cystectomy Left 2002    Dr. Villalta     SALPINGO OOPHORECTOMY,R/L/BOBBY Right     complete Dr. Villalta       02 Pelvic US for adnexal mass [id number] multiloculated R ovarian mass, suspicious for mucinous ovarian neoplasm, causing marked R sided hydronephrosis and occupying entire peritoneal cavity.     -2002 Path report:    A. Right Fallopian tube and ovary:      1. benign multilocular ovarian mucinous cystadenoma     2. Fallopian tube without histopathologic changes   B. 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 Mesosalpinx nodule - portion of fimbriated portion of Fallopian tube with benign serous adenofibroma with focal papillary features   D. Appendix - no histopathologic changes   E. Pelvic fluid for cytology      1. Mesothelial cells and blood     2. Negative for malignant cells    Meds:   Current Outpatient Medications   Medication     adapalene (DIFFERIN) 0.1 % cream     cholecalciferol (VITAMIN  -D) 1000 UNITS capsule     No current facility-administered medications for this visit.      Allergies:  No Known Allergies    SocHx: Not discussed     FamHx:  Not discussed     ROS: 10-Point ROS negative except as noted in HPI    Physical Exam  /82   Pulse 84   Wt 90.3 kg (199 lb)   LMP 2019   BMI 28.55 kg/m     Gen: Well-appearing, NAD, tearful at times but consolable   HEENT: Normocephalic, atraumatic  Ext: No LE edema, extremities warm and well perfused    Assessment/Plan:  Katty Mendoza is a 38 year old  female here to discuss prior ovarian surgery and fertility     #Discussion of prior ovarian surgery by Dr. Villalta in   --Per op report on  5/7/02 procedure entailed:   1, Exploratory laparotomy   2. Right salpingo-ophrectomy   3. Left adnexal cystectomy with biopsy of left adnexal nodule  4.  Appendectomy  --Revised patient's surgical history to reflect the above procedures.   --Explained the surgery and that the left ovary was, in fact, not removed as she had suspected.  --Discussed the path results at length. Only her Right ovary was removed as that was where the large benign multilocular ovarian mucinous cystadenoma was. Patient was surprised to learn she had her appendix removed, discussed the rationale for this with mucinous tumors. On the left side, only a cystectomy was done of the left adnexa with a biopsy of a left adnexal nodule - explained that this was adjacent to the ovary but not the left  ovary itself. These results were benign, common findings - hyatid of Morgagni and a Mesosalpinx nodule.   --She is appreciative of Dr. Edge's discussion and understands the operation and its sequelae better.     #Ovarian reserve lab results   -Discussed lab results at length and explained that her ovarian reserve is good for her age  -Discussed that based on these results, it is possible that she could conceive   -Offered contraception, patient declines at this time    #Gardasil vaccine  -Start series today, follow up in 4 mo and 6 mo for subsequent doses    Return to clinic prn     Staffed with Dr. Edge, I acted as her scribe for this patient encounter.     Elvira Espinoza MD  Obstetrics and Gyncology, PGY-1  08/22/19 , 7:54 AM      Total visit time was 25 minutes with 25 minutes spent in counseling and coordination of care for prior ovarian surgery, ovarian reserve and HPV vaccine .    Appreciate Dr. Espinoza's note above, patient also seen and examined by me. I agree with the note above.     Catrina Edge MD

## 2019-08-23 ASSESSMENT — ANXIETY QUESTIONNAIRES: GAD7 TOTAL SCORE: 0

## 2019-10-08 ENCOUNTER — HOSPITAL ENCOUNTER (OUTPATIENT)
Facility: AMBULATORY SURGERY CENTER | Age: 39
End: 2019-10-08
Attending: INTERNAL MEDICINE
Payer: COMMERCIAL

## 2019-11-04 ENCOUNTER — HEALTH MAINTENANCE LETTER (OUTPATIENT)
Age: 39
End: 2019-11-04

## 2020-01-07 ENCOUNTER — OFFICE VISIT (OUTPATIENT)
Dept: DERMATOLOGY | Facility: CLINIC | Age: 40
End: 2020-01-07
Payer: COMMERCIAL

## 2020-01-07 DIAGNOSIS — L70.0 ACNE VULGARIS: ICD-10-CM

## 2020-01-07 DIAGNOSIS — D22.9 MULTIPLE NEVI: Primary | ICD-10-CM

## 2020-01-07 RX ORDER — ADAPALENE 0.1 G/100G
CREAM TOPICAL
Qty: 45 G | Refills: 11 | Status: SHIPPED | OUTPATIENT
Start: 2020-01-07 | End: 2020-07-16

## 2020-01-07 ASSESSMENT — PAIN SCALES - GENERAL: PAINLEVEL: NO PAIN (0)

## 2020-01-07 NOTE — NURSING NOTE
Dermatology Rooming Note    Katty Mendoza's goals for this visit include:   Chief Complaint   Patient presents with     Derm Problem     Katty is here for a skin check and acne follow up. States no concerns.        Sara Strong LPN

## 2020-01-07 NOTE — PROGRESS NOTES
[facility] Dermatology Note      Dermatology Problem List:  1.Acne Vulgaris  2. Atopic dermatitis  3. Keratosis pilaris    CC:   Chief Complaint   Patient presents with     Derm Problem     Katty is here for a skin check and acne follow up. States no concerns.          Encounter Date: Jan 7, 2020    History of Present Illness:  Ms. Katty Mendoza is a 39 year old female who with no history of skin cancer presents for a skin check.  No concerns.  No bleeding or tender lesions.  She continues to have stress induced dermatitis around the eyes.  She did not find the Protopic helpful and has been using Aquaphor which helps.  She does like the Differin cream for acne and would like to continue this medication.  She uses either Cera Ve or Vanicream daily as a moisturizer.    Past Medical History:   Patient Active Problem List   Diagnosis     Vitamin D deficiency     Past Medical History:   Diagnosis Date     Anxiety      Colon polyps      Past Surgical History:   Procedure Laterality Date     APPENDECTOMY  05/08/2002    Dr. Villalta     BREAST BIOPSY, RT/LT Right 2008    times 2; fibroadenoma     BREAST SURGERY  2007,2013     COLONOSCOPY N/A 4/22/2019    Procedure: COMBINED COLONOSCOPY, SINGLE OR MULTIPLE BIOPSY/POLYPECTOMY BY BIOPSY;  Surgeon: Juan Miguel Guan MD;  Location: [facility] OR     left adnexal cystectomy Left 05/07/2002    Dr. Villalta     SALPINGO OOPHORECTOMY,R/L/BOBBY Right 2002    complete Dr. Villalta       Social History:  Patient reports that she has never smoked. She has never used smokeless tobacco. She reports current alcohol use of about 0.8 - 1.7 standard drinks of alcohol per week. She reports that she does not use drugs.    Family History:  Family History   Problem Relation Age of Onset     Cancer Paternal Grandmother         melanoma     Melanoma Paternal Grandmother      Lipids Father      Osteoporosis Maternal Grandmother      Cerebrovascular Disease Maternal Grandfather      Asthma No family  hx of      C.A.D. No family hx of      Colon Cancer No family hx of      Inflammatory Bowel Disease No family hx of        Medications:  Current Outpatient Medications   Medication Sig Dispense Refill     adapalene (DIFFERIN) 0.1 % cream At bedtime 45 g 11     cholecalciferol (VITAMIN  -D) 1000 UNITS capsule Take 4 capsules (4,000 Units) by mouth daily Take one capsule daily. 360 capsule 1     No Known Allergies        Physical exam:  Vitals: There were no vitals taken for this visit.  GEN: This is a well developed, well-nourished female in no acute distress, in a pleasant mood.    SKIN: Full skin, which includes the head/face, both arms, chest, back, abdomen,both legs, buttocks, digits and/or nails, was examined.  -Hayes skin type: I  -Multiple regular brown pigmented macules and papules are identified on the face, trunk and extremities.   -No concerning features with dermoscopy.  -There is xerosis of the skin with pink follicle centered scaly papules of the back of the upper arms.   -No other lesions of concern on areas examined.     Impression/Plan:  1. Multiple clinically benign nevi on the exam.  None with atypia  - Reviewed the warning signs for skin cancer      2. Keratosis pilaris  - Continue emollients daily      3. Acne vulgaris  - well controlled  - continue Differin cream at bedtime      CC Referred Self, MD  No address on file on close of this encounter.  Follow-up in 1 year, earlier for new or changing lesions.       Staff Involved:  Staff Only

## 2020-01-07 NOTE — LETTER
1/7/2020       RE: Katty Mendoza  [address]  [facility] [address]     Dear Colleague,    Thank you for referring your patient, Katty Mendoza, to the [facility] DERMATOLOGY at [facility]. Please see a copy of my visit note below.    [facility] Dermatology Note      Dermatology Problem List:  1.Acne Vulgaris  2. Atopic dermatitis  3. Keratosis pilaris    CC:   Chief Complaint   Patient presents with     Derm Problem     Katty is here for a skin check and acne follow up. States no concerns.          Encounter Date: Jan 7, 2020    History of Present Illness:  Ms. Katty Mendoza is a 39 year old female who with no history of skin cancer presents for a skin check.  No concerns.  No bleeding or tender lesions.  She continues to have stress induced dermatitis around the eyes.  She did not find the Protopic helpful and has been using Aquaphor which helps.  She does like the Differin cream for acne and would like to continue this medication.  She uses either Cera Ve or Vanicream daily as a moisturizer.    Past Medical History:   Patient Active Problem List   Diagnosis     Vitamin D deficiency     Past Medical History:   Diagnosis Date     Anxiety      Colon polyps      Past Surgical History:   Procedure Laterality Date     APPENDECTOMY  05/08/2002    Dr. Villalta     BREAST BIOPSY, RT/LT Right 2008    times 2; fibroadenoma     BREAST SURGERY  2007,2013     COLONOSCOPY N/A 4/22/2019    Procedure: COMBINED COLONOSCOPY, SINGLE OR MULTIPLE BIOPSY/POLYPECTOMY BY BIOPSY;  Surgeon: Juan Miguel Guan MD;  Location: [facility] OR     left adnexal cystectomy Left 05/07/2002    Dr. Villalta     SALPINGO OOPHORECTOMY,R/L/BOBBY Right 2002    complete Dr. Villalta       Social History:  Patient reports that she has never smoked. She has never used smokeless tobacco. She reports current alcohol use of about 0.8 - 1.7 standard drinks of alcohol per week. She reports that she does not  use drugs.    Family History:  Family History   Problem Relation Age of Onset     Cancer Paternal Grandmother         melanoma     Melanoma Paternal Grandmother      Lipids Father      Osteoporosis Maternal Grandmother      Cerebrovascular Disease Maternal Grandfather      Asthma No family hx of      C.A.D. No family hx of      Colon Cancer No family hx of      Inflammatory Bowel Disease No family hx of        Medications:  Current Outpatient Medications   Medication Sig Dispense Refill     adapalene (DIFFERIN) 0.1 % cream At bedtime 45 g 11     cholecalciferol (VITAMIN  -D) 1000 UNITS capsule Take 4 capsules (4,000 Units) by mouth daily Take one capsule daily. 360 capsule 1     No Known Allergies        Physical exam:  Vitals: There were no vitals taken for this visit.  GEN: This is a well developed, well-nourished female in no acute distress, in a pleasant mood.    SKIN: Full skin, which includes the head/face, both arms, chest, back, abdomen,both legs, buttocks, digits and/or nails, was examined.  -Hayes skin type: I  -Multiple regular brown pigmented macules and papules are identified on the face, trunk and extremities.   -No concerning features with dermoscopy.  -There is xerosis of the skin with pink follicle centered scaly papules of the back of the upper arms.   -No other lesions of concern on areas examined.     Impression/Plan:  1. Multiple clinically benign nevi on the exam.  None with atypia  - Reviewed the warning signs for skin cancer      2. Keratosis pilaris  - Continue emollients daily      3. Acne vulgaris  - well controlled  - continue Differin cream at bedtime      CC Referred Self, MD  No address on file on close of this encounter.  Follow-up in 1 year, earlier for new or changing lesions.       Staff Involved:  Staff Only    Again, thank you for allowing me to participate in the care of your patient.      Sincerely,    Tamie Carrillo MD

## 2020-01-10 ENCOUNTER — ALLIED HEALTH/NURSE VISIT (OUTPATIENT)
Dept: OBGYN | Facility: CLINIC | Age: 40
End: 2020-01-10
Payer: COMMERCIAL

## 2020-01-10 DIAGNOSIS — Z23 NEED FOR HPV VACCINATION: Primary | ICD-10-CM

## 2020-01-10 PROCEDURE — 90471 IMMUNIZATION ADMIN: CPT | Mod: ZF

## 2020-01-10 PROCEDURE — 90651 9VHPV VACCINE 2/3 DOSE IM: CPT | Mod: ZF

## 2020-01-10 PROCEDURE — 25000581 ZZH RX MED A9270 GY (STAT IND- M) 250: Mod: ZF

## 2020-01-10 NOTE — NURSING NOTE
Chief Complaint   Patient presents with     Allied Health Visit     2 nd HPV injection   Skylar Landry LPN

## 2020-01-13 ENCOUNTER — OFFICE VISIT (OUTPATIENT)
Dept: DERMATOLOGY | Facility: CLINIC | Age: 40
End: 2020-01-13
Payer: COMMERCIAL

## 2020-01-13 ENCOUNTER — TELEPHONE (OUTPATIENT)
Dept: DERMATOLOGY | Facility: CLINIC | Age: 40
End: 2020-01-13

## 2020-01-13 DIAGNOSIS — D48.5 NEOPLASM OF UNCERTAIN BEHAVIOR OF SKIN: Primary | ICD-10-CM

## 2020-01-13 RX ORDER — LIDOCAINE HYDROCHLORIDE AND EPINEPHRINE 10; 10 MG/ML; UG/ML
3 INJECTION, SOLUTION INFILTRATION; PERINEURAL ONCE
Status: DISCONTINUED | OUTPATIENT
Start: 2020-01-13 | End: 2021-05-18

## 2020-01-13 ASSESSMENT — PAIN SCALES - GENERAL
PAINLEVEL: NO PAIN (0)
PAINLEVEL: NO PAIN (0)

## 2020-01-13 NOTE — TELEPHONE ENCOUNTER
[facility] Center    Phone Message    May a detailed message be left on voicemail: yes    Reason for Call: Symptoms or Concerns     If patient has red-flag symptoms, warm transfer to triage line    Current symptom or concern: mole was bleeding and not fully attached    Symptoms have been present for:  1 day(s)    Has patient previously been seen for this? Yes    By : Dr. Carrillo    Date: 1/7/20    Are there any new or worsening symptoms? Yes: patient states she noticed a mole that may have partially gotten detached and was bleeding, bleeding has stopped but patient is not sure if she should be seen for it or wait to see if it reattaches itself. Please call to discuss thank you.       Action Taken: Message routed to:  [facility] ([facility] Derm

## 2020-01-13 NOTE — NURSING NOTE
Dermatology Rooming Note    Katty [facility]'s goals for this visit include:   Chief Complaint   Patient presents with     Derm Problem     Katty is here today for a mole check on her neck.      LANRE Jacobs

## 2020-01-13 NOTE — PROGRESS NOTES
[facility] Dermatology Note      Dermatology Problem List:  1.Acne Vulgaris  2. Atopic dermatitis  3. Keratosis pilaris  4. NUB on the left anterior neck s/p biopsy 1/13/2020 DDx: irritated skin tag vs Other    Encounter Date: Jan 13, 2020    CC:  Chief Complaint   Patient presents with     Derm Problem     aKtty is here today for a mole check on her neck.          History of Present Illness:  Ms. Katty Mendoza is a 39 year old female who is a return patient to the clinic and presents for a lesion of concern. The patient was last seen on 01/07/2020 by Dr. Carrillo when emollients daily were continued for keratosis pilaris and differin cream qPM was continued for acne. At today's visit, the patient notes a mole on her neck that she would like to be evaluated. She states that the mole on her neck has been there her whole life. However, last night she noticed blood trickling down the mole after taking a shower and putting on a collared shirt. She notes that her mole felt half attached last night. She has no recollection of traumatizing it. The patient denies additional lesions or areas of concern. The patient denies painful, itching, tingling or bleeding lesions unless otherwise noted.    Past Medical History:   Patient Active Problem List   Diagnosis     Vitamin D deficiency     Past Medical History:   Diagnosis Date     Anxiety      Colon polyps      Past Surgical History:   Procedure Laterality Date     APPENDECTOMY  05/08/2002    Dr. Villalta     BREAST BIOPSY, RT/LT Right 2008    times 2; fibroadenoma     BREAST SURGERY  2007,2013     COLONOSCOPY N/A 4/22/2019    Procedure: COMBINED COLONOSCOPY, SINGLE OR MULTIPLE BIOPSY/POLYPECTOMY BY BIOPSY;  Surgeon: Juan Miguel Guan MD;  Location: [facility]     left adnexal cystectomy Left 05/07/2002    Dr. Villalta     SALPINGO OOPHORECTOMY,R/L/BOBBY Right 2002    complete Dr. Villalta       Social History:   reports that she has never smoked. She has never used  smokeless tobacco. She reports current alcohol use of about 0.8 - 1.7 standard drinks of alcohol per week. She reports that she does not use drugs.    Family History:  Family History   Problem Relation Age of Onset     Cancer Paternal Grandmother         melanoma     Melanoma Paternal Grandmother      Lipids Father      Osteoporosis Maternal Grandmother      Cerebrovascular Disease Maternal Grandfather      Asthma No family hx of      C.A.D. No family hx of      Colon Cancer No family hx of      Inflammatory Bowel Disease No family hx of        Medications:  Current Outpatient Medications   Medication Sig Dispense Refill     adapalene (DIFFERIN) 0.1 % external cream At bedtime 45 g 11     cholecalciferol (VITAMIN  -D) 1000 UNITS capsule Take 4 capsules (4,000 Units) by mouth daily Take one capsule daily. 360 capsule 1       No Known Allergies    Review of Systems:  -Heme/Lymph: no concerning bumps, no bleeding or bruising problems   -Constitutional: The patient denies fatigue, fevers, chills, unintended weight loss, and night sweats.  -HEENT: Patient denies nonhealing oral sores.  -Skin: As above in HPI. No additional skin concerns.    Physical exam:  Vitals: There were no vitals taken for this visit.  GEN: This is a well developed, well-nourished female in no acute distress, in a pleasant mood.    SKIN: Focused examination of the head and neck was performed.   -There is a 3 mm irritated skin colored pedunculated papule on the left anterior neck. Acutely traumatized.  -No other lesions of concern on areas examined.       Impression/Plan:  1. Neoplasm of uncertain behavior on the left anterior neck. The differential diagnosis includes irritated skin tag vs Other.     Shave biopsy:  After discussion of benefits and risks including but not limited to bleeding/bruising, pain/swelling, infection, scar, incomplete removal, nerve damage/numbness, recurrence, and non-diagnostic biopsy, written consent, verbal consent and  photographs were obtained. Time-out was performed. The area was cleaned with isopropyl alcohol. 0.5ml of 1% lidocaine with 1:100,000 epinephrine was injected to obtain adequate anesthesia. A shave biopsy was performed. Hemostasis was achieved with aluminium chloride. Vaseline and a sterile dressing were applied. The patient tolerated the procedure and no complications were noted. The patient was provided with verbal and written post care instructions.    Photograph was obtained for clinical monitoring and inclusion in medical record.    CC Dr. Dillon on close of this encounter.  Follow-up prn for new or changing lesions.       Staff Involved:  Staff/Scribe    Scribe Disclosure:  IGabe, am serving as a scribe to document services personally performed by Rosi Fragoso PA-C, based on data collection and the provider's statements to me.     Provider Disclosure:   The documentation recorded by the scribe accurately reflects the services I personally performed and the decisions made by me.    All risks, benefits and alternatives were discussed with patient.  Patient is in agreement and understands the assessment and plan.  All questions were answered.    Rosi Fragoso PA-C, [facility]  [facility] [place]: Phone: [phone], Fax: [phone]  [facility]: Phone: [phone],  Fax: [phone]

## 2020-01-13 NOTE — LETTER
1/13/2020       RE: Katty Mendoza  [address]  [address] [address]     Dear Colleague,    Thank you for referring your patient, Katty Mendoza, to the [facility] DERMATOLOGY at [facility]. Please see a copy of my visit note below.    [facility] Dermatology Note      Dermatology Problem List:  1.Acne Vulgaris  2. Atopic dermatitis  3. Keratosis pilaris  4. NUB on the left anterior neck s/p biopsy 1/13/2020 DDx: irritated skin tag vs Other    Encounter Date: Jan 13, 2020    CC:  Chief Complaint   Patient presents with     Derm Problem     Katty is here today for a mole check on her neck.          History of Present Illness:  Ms. Katty Mendoza is a 39 year old female who is a return patient to the clinic and presents for a lesion of concern. The patient was last seen on 01/07/2020 by Dr. Carrillo when emollients daily were continued for keratosis pilaris and differin cream qPM was continued for acne. At today's visit, the patient notes a mole on her neck that she would like to be evaluated. She states that the mole on her neck has been there her whole life. However, last night she noticed blood trickling down the mole after taking a shower and putting on a collared shirt. She notes that her mole felt half attached last night. She has no recollection of traumatizing it. The patient denies additional lesions or areas of concern. The patient denies painful, itching, tingling or bleeding lesions unless otherwise noted.    Past Medical History:   Patient Active Problem List   Diagnosis     Vitamin D deficiency     Past Medical History:   Diagnosis Date     Anxiety      Colon polyps      Past Surgical History:   Procedure Laterality Date     APPENDECTOMY  05/08/2002    Dr. Villalta     BREAST BIOPSY, RT/LT Right 2008    times 2; fibroadenoma     BREAST SURGERY  2007,2013     COLONOSCOPY N/A 4/22/2019    Procedure: COMBINED COLONOSCOPY, SINGLE OR  MULTIPLE BIOPSY/POLYPECTOMY BY BIOPSY;  Surgeon: Juan Miguel Guan MD;  Location: [facility]     left adnexal cystectomy Left 05/07/2002    Dr. Villalta     SALPINGO OOPHORECTOMY,R/L/BOBBY Right 2002    complete Dr. Villalta       Social History:   reports that she has never smoked. She has never used smokeless tobacco. She reports current alcohol use of about 0.8 - 1.7 standard drinks of alcohol per week. She reports that she does not use drugs.    Family History:  Family History   Problem Relation Age of Onset     Cancer Paternal Grandmother         melanoma     Melanoma Paternal Grandmother      Lipids Father      Osteoporosis Maternal Grandmother      Cerebrovascular Disease Maternal Grandfather      Asthma No family hx of      C.A.D. No family hx of      Colon Cancer No family hx of      Inflammatory Bowel Disease No family hx of        Medications:  Current Outpatient Medications   Medication Sig Dispense Refill     adapalene (DIFFERIN) 0.1 % external cream At bedtime 45 g 11     cholecalciferol (VITAMIN  -D) 1000 UNITS capsule Take 4 capsules (4,000 Units) by mouth daily Take one capsule daily. 360 capsule 1       No Known Allergies    Review of Systems:  -Heme/Lymph: no concerning bumps, no bleeding or bruising problems   -Constitutional: The patient denies fatigue, fevers, chills, unintended weight loss, and night sweats.  -HEENT: Patient denies nonhealing oral sores.  -Skin: As above in HPI. No additional skin concerns.    Physical exam:  Vitals: There were no vitals taken for this visit.  GEN: This is a well developed, well-nourished female in no acute distress, in a pleasant mood.    SKIN: Focused examination of the head and neck was performed.   -There is a 3 mm irritated skin colored pedunculated papule on the left anterior neck. Acutely traumatized.  -No other lesions of concern on areas examined.       Impression/Plan:  1. Neoplasm of uncertain behavior on the left anterior neck. The differential diagnosis  includes irritated skin tag vs Other.     Shave biopsy:  After discussion of benefits and risks including but not limited to bleeding/bruising, pain/swelling, infection, scar, incomplete removal, nerve damage/numbness, recurrence, and non-diagnostic biopsy, written consent, verbal consent and photographs were obtained. Time-out was performed. The area was cleaned with isopropyl alcohol. 0.5ml of 1% lidocaine with 1:100,000 epinephrine was injected to obtain adequate anesthesia. A shave biopsy was performed. Hemostasis was achieved with aluminium chloride. Vaseline and a sterile dressing were applied. The patient tolerated the procedure and no complications were noted. The patient was provided with verbal and written post care instructions.    Photograph was obtained for clinical monitoring and inclusion in medical record.    CC Dr. Dillon on close of this encounter.  Follow-up prn for new or changing lesions.       Staff Involved:  Staff/Scribe    Scribe Disclosure:  I, Gabe Dee, am serving as a scribe to document services personally performed by Rosi Fragoso PA-C, based on data collection and the provider's statements to me.     Provider Disclosure:   The documentation recorded by the scribe accurately reflects the services I personally performed and the decisions made by me.    All risks, benefits and alternatives were discussed with patient.  Patient is in agreement and understands the assessment and plan.  All questions were answered.    Rosi Fragoso PA-C, [facility]  [facility]: Phone: [phone], Fax: [phone]  [facility]: Phone: [phone],  Fax: [phone]

## 2020-01-13 NOTE — NURSING NOTE
Lidocaine-epinephrine 1-1:035894 % injection   1mL once for one use, starting 1/13/2020 ending 1/13/2020,  2mL disp, R-0, injection  Injected by LANRE Jacobs

## 2020-01-13 NOTE — PATIENT INSTRUCTIONS

## 2020-01-14 LAB — COPATH REPORT: NORMAL

## 2020-01-21 ENCOUNTER — TELEPHONE (OUTPATIENT)
Dept: GASTROENTEROLOGY | Facility: CLINIC | Age: 40
End: 2020-01-21

## 2020-01-21 DIAGNOSIS — Z12.11 ENCOUNTER FOR SCREENING COLONOSCOPY: Primary | ICD-10-CM

## 2020-01-21 NOTE — TELEPHONE ENCOUNTER
Patient scheduled for Colonoscopy    Indication for procedure. History of colonic polyps    Referring Provider. WAGNER ENRIQUEZ    ? No     Arrival time verified? 6:30 AM    Facility location verified? [address] [facility]    Instructions given regarding prep and procedure patient declined review    Prep Type Golytely    Are you taking any anticoagulants or blood thinners? No     Instructions given? N/a     Electronic implanted devices? Denies     Pre procedure teaching completed? Yes    Transportation from procedure?  policy reviewed. Instructed patient to have someone stay with her for 6 hours post exam    H&P / Pre op physical completed? N/a

## 2020-01-24 RX ORDER — LIDOCAINE 40 MG/G
CREAM TOPICAL
Status: CANCELLED | OUTPATIENT
Start: 2020-01-24

## 2020-01-24 RX ORDER — ONDANSETRON 2 MG/ML
4 INJECTION INTRAMUSCULAR; INTRAVENOUS
Status: CANCELLED | OUTPATIENT
Start: 2020-01-24

## 2020-02-17 ENCOUNTER — TELEPHONE (OUTPATIENT)
Dept: GASTROENTEROLOGY | Facility: CLINIC | Age: 40
End: 2020-02-17

## 2020-02-21 DIAGNOSIS — Z86.0100 HISTORY OF COLONIC POLYPS: Primary | ICD-10-CM

## 2020-02-24 ENCOUNTER — HOSPITAL ENCOUNTER (OUTPATIENT)
Facility: AMBULATORY SURGERY CENTER | Age: 40
End: 2020-02-24
Attending: INTERNAL MEDICINE
Payer: COMMERCIAL

## 2020-02-24 ENCOUNTER — ANESTHESIA EVENT (OUTPATIENT)
Dept: SURGERY | Facility: AMBULATORY SURGERY CENTER | Age: 40
End: 2020-02-24

## 2020-02-24 ENCOUNTER — ANESTHESIA (OUTPATIENT)
Dept: SURGERY | Facility: AMBULATORY SURGERY CENTER | Age: 40
End: 2020-02-24

## 2020-02-24 VITALS
DIASTOLIC BLOOD PRESSURE: 73 MMHG | OXYGEN SATURATION: 98 % | HEART RATE: 67 BPM | RESPIRATION RATE: 16 BRPM | SYSTOLIC BLOOD PRESSURE: 113 MMHG | TEMPERATURE: 99 F

## 2020-02-24 LAB — COLONOSCOPY: NORMAL

## 2020-02-24 RX ORDER — LIDOCAINE 40 MG/G
CREAM TOPICAL
Status: DISCONTINUED | OUTPATIENT
Start: 2020-02-24 | End: 2020-02-24 | Stop reason: HOSPADM

## 2020-02-24 RX ORDER — ONDANSETRON 4 MG/1
4 TABLET, ORALLY DISINTEGRATING ORAL EVERY 6 HOURS PRN
Status: DISCONTINUED | OUTPATIENT
Start: 2020-02-24 | End: 2020-02-25 | Stop reason: HOSPADM

## 2020-02-24 RX ORDER — ONDANSETRON 2 MG/ML
4 INJECTION INTRAMUSCULAR; INTRAVENOUS
Status: DISCONTINUED | OUTPATIENT
Start: 2020-02-24 | End: 2020-02-24 | Stop reason: HOSPADM

## 2020-02-24 RX ORDER — FENTANYL CITRATE 50 UG/ML
INJECTION, SOLUTION INTRAMUSCULAR; INTRAVENOUS PRN
Status: DISCONTINUED | OUTPATIENT
Start: 2020-02-24 | End: 2020-02-24 | Stop reason: HOSPADM

## 2020-02-24 RX ORDER — FLUMAZENIL 0.1 MG/ML
0.2 INJECTION, SOLUTION INTRAVENOUS
Status: DISCONTINUED | OUTPATIENT
Start: 2020-02-24 | End: 2020-02-25 | Stop reason: HOSPADM

## 2020-02-24 RX ORDER — NALOXONE HYDROCHLORIDE 0.4 MG/ML
.1-.4 INJECTION, SOLUTION INTRAMUSCULAR; INTRAVENOUS; SUBCUTANEOUS
Status: DISCONTINUED | OUTPATIENT
Start: 2020-02-24 | End: 2020-02-25 | Stop reason: HOSPADM

## 2020-02-24 RX ORDER — ONDANSETRON 2 MG/ML
4 INJECTION INTRAMUSCULAR; INTRAVENOUS EVERY 6 HOURS PRN
Status: DISCONTINUED | OUTPATIENT
Start: 2020-02-24 | End: 2020-02-25 | Stop reason: HOSPADM

## 2020-02-24 RX ORDER — SIMETHICONE
LIQUID (ML) MISCELLANEOUS PRN
Status: DISCONTINUED | OUTPATIENT
Start: 2020-02-24 | End: 2020-02-24 | Stop reason: HOSPADM

## 2020-02-24 NOTE — DISCHARGE INSTRUCTIONS
Discharge Instructions after Colonoscopy  or Sigmoidoscopy    Today you had a ____ Colonoscopy ____ Sigmoidoscopy    Activity and Diet  You were given medicine for pain. You may be dizzy or sleepy.  For 24 hours:    Do not drive or use heavy equipment.    Do not make important decisions.    Do not drink any alcohol.  You may return to your normal diet and medicines.    Discomfort    Air was placed in your colon during the exam in order to see it. Walking helps to pass the air.    You may take Tylenol (acetaminophen) for pain unless your doctor has told you not to.  Do not take aspirin or ibuprofen (Advil, Motrin, or other anti-inflammatory  drugs) for _____ days.    Follow-up  ____ We took small tissue samples or polyps to study. Your doctor will call you with the results  within two weeks.    When to call:    Call right away if you have:    Unusual pain in belly or chest pain not relieved with passing air.    More than 1 to 2 Tablespoons of bleeding from your rectum.    Fever above 100.6  F (37.5  C).    If you have severe pain, bleeding, or shortness of breath, go to an emergency room.    If you have questions, call:  Monday to Friday, 7 a.m. to 4:30 p.m.  Endoscopy: [phone] (We may have to call you back)    After hours  Hospital: [phone] (Ask for the GI fellow on call)

## 2020-02-26 LAB — COPATH REPORT: NORMAL

## 2020-03-19 ENCOUNTER — TELEPHONE (OUTPATIENT)
Dept: DERMATOLOGY | Facility: CLINIC | Age: 40
End: 2020-03-19

## 2020-05-27 ENCOUNTER — TELEPHONE (OUTPATIENT)
Dept: OBGYN | Facility: CLINIC | Age: 40
End: 2020-05-27

## 2020-05-27 ENCOUNTER — ALLIED HEALTH/NURSE VISIT (OUTPATIENT)
Dept: OBGYN | Facility: CLINIC | Age: 40
End: 2020-05-27
Payer: COMMERCIAL

## 2020-05-27 DIAGNOSIS — Z23 NEED FOR HPV VACCINATION: Primary | ICD-10-CM

## 2020-05-27 PROCEDURE — 90651 9VHPV VACCINE 2/3 DOSE IM: CPT | Mod: ZF

## 2020-05-27 PROCEDURE — 40000269 ZZH STATISTIC NO CHARGE FACILITY FEE: Mod: ZF

## 2020-05-27 PROCEDURE — 90471 IMMUNIZATION ADMIN: CPT | Mod: ZF

## 2020-05-27 PROCEDURE — 25000581 ZZH RX MED A9270 GY (STAT IND- M) 250: Mod: ZF

## 2020-05-27 NOTE — NURSING NOTE
Chief Complaint   Patient presents with     Allied Health Visit     Patient presented to clinic for 3rd dose of Gardasil Vaccine. Patient also complained about some breast discomfort and asked to speak to a triage nurse. Handed off to triage.

## 2020-05-27 NOTE — TELEPHONE ENCOUNTER
Pt had questions after gardisel injection today    She has concerns with intermittent bilateral breast discomfort and describes it on the side of the breast and nothing brings it on    She denies any breast lumps, no discharge from nipples, no inverted nipple    States she has a history of adenoma in breasts that she has had removed x 2 on the right breast and only way these were detected has been by ultrasound she states    She states when she wears two wire bras and a compression bra she has no discomfort    States she has been working on home from [employer] and unsure of ergonomics at home    To MD to review and advise.      Denies any redness or heat to breast.

## 2020-06-01 NOTE — TELEPHONE ENCOUNTER
Called Katty, continues to note breast discomfort that isn't constant, moves throughout both breasts.  Is relieved with wearing sports bra.  Advised trial of wearing sports bra during the day, ibuprofen prn.  Has follow-up in July for annual exam.  Catrina Edge MD

## 2020-06-05 ENCOUNTER — VIRTUAL VISIT (OUTPATIENT)
Dept: FAMILY MEDICINE | Facility: CLINIC | Age: 40
End: 2020-06-05
Payer: COMMERCIAL

## 2020-06-05 ENCOUNTER — OFFICE VISIT (OUTPATIENT)
Dept: URGENT CARE | Facility: URGENT CARE | Age: 40
End: 2020-06-05
Payer: COMMERCIAL

## 2020-06-05 VITALS
DIASTOLIC BLOOD PRESSURE: 77 MMHG | WEIGHT: 195 LBS | TEMPERATURE: 96.8 F | SYSTOLIC BLOOD PRESSURE: 126 MMHG | BODY MASS INDEX: 27.98 KG/M2 | OXYGEN SATURATION: 98 % | HEART RATE: 77 BPM

## 2020-06-05 DIAGNOSIS — J30.1 ALLERGIC RHINITIS DUE TO POLLEN, UNSPECIFIED SEASONALITY: Primary | ICD-10-CM

## 2020-06-05 DIAGNOSIS — R07.0 THROAT DISCOMFORT: Primary | ICD-10-CM

## 2020-06-05 PROCEDURE — 99203 OFFICE O/P NEW LOW 30 MIN: CPT | Mod: GT | Performed by: INTERNAL MEDICINE

## 2020-06-05 PROCEDURE — 99213 OFFICE O/P EST LOW 20 MIN: CPT | Performed by: NURSE PRACTITIONER

## 2020-06-05 RX ORDER — CETIRIZINE HYDROCHLORIDE 10 MG/1
10 TABLET ORAL DAILY
Qty: 21 TABLET | Refills: 0 | Status: SHIPPED | OUTPATIENT
Start: 2020-06-05 | End: 2021-01-04

## 2020-06-05 RX ORDER — PREDNISONE 10 MG/1
TABLET ORAL
Qty: 7 TABLET | Refills: 0 | Status: SHIPPED | OUTPATIENT
Start: 2020-06-05 | End: 2020-06-05

## 2020-06-05 RX ORDER — PREDNISONE 10 MG/1
TABLET ORAL
Qty: 7 TABLET | Refills: 0 | Status: SHIPPED | OUTPATIENT
Start: 2020-06-05 | End: 2020-07-16

## 2020-06-05 RX ORDER — CETIRIZINE HYDROCHLORIDE 10 MG/1
10 TABLET ORAL DAILY
Qty: 21 TABLET | Refills: 0 | Status: SHIPPED | OUTPATIENT
Start: 2020-06-05 | End: 2020-06-05

## 2020-06-05 NOTE — PROGRESS NOTES
"Katty Mendoza is a 39 year old female who is being evaluated via a billable video visit.      The patient has been notified of following:     \"This video visit will be conducted via a call between you and your physician/provider. We have found that certain health care needs can be provided without the need for an in-person physical exam.  This service lets us provide the care you need with a video conversation.  If a prescription is necessary we can send it directly to your pharmacy.  If lab work is needed we can place an order for that and you can then stop by our lab to have the test done at a later time.    Video visits are billed at different rates depending on your insurance coverage.  Please reach out to your insurance provider with any questions.    If during the course of the call the physician/provider feels a video visit is not appropriate, you will not be charged for this service.\"    Patient has given verbal consent for Video visit? Yes    How would you like to obtain your AVS? Caitlinhart    Patient would like the video invitation sent by: Send to e-mail at: [email or web address]    Will anyone else be joining your video visit? No   advised patient differential currently is either postnasal drip or GERD symptoms she denies any swelling of the tonsils or on throat swelling or tightness.  Denies any difficulty breathing    Subjective     Katty Mendoza is a 39 year old female who presents today via video visit for the following health issues:    HPI  Patient requesting video visit to discuss having some scratchy stuck-like sensation in the throat occurring for last couple weeks.  Can happen during the middle of the day but then she forgets about it.  Yesterday middle of the day she felt something \"blocking her throat\".  Breathing and swallowing is okay; with this throat sensation is making her panic and not wanting to eat.  Also she felt left eye was watery, this morning also the face was slightly puffy with " "sensation of \"something stuck in her throat\".  She is concerned because the weekend is coming.  She does have history of adult onset allergies but these happen more during fall time, she takes over-the-counter Sudafed.  She felt her left eye was watery this morning, felt swollen on the left face, no breathing issues or stridor or noisy breathing.  Denies any fever cough or postnasal drip no choking with food.  She does not know if her tonsils are swollen, denies any sore throat.  But she has a constant sensation of something there in her throat.  She is not wearing make-up, she put some sunscreen, cinnamon she has been eating more cinnamon \"in her food but nothing out of ordinary\", She has doubled her coffee intake.  Denies any GERD symptoms.  Denies any fever, denies any runny nose or sinus pressure congestion.  She has been sleeping with the window open and she feels stuffiness in the morning.  No rashes, no tongue or lip swelling, no other systemic complaints.      Video Start Time: 12:13 PM      Patient Active Problem List   Diagnosis     Vitamin D deficiency     Past Surgical History:   Procedure Laterality Date     APPENDECTOMY  05/08/2002    Dr. Villalta     BREAST BIOPSY, RT/LT Right 2008    times 2; fibroadenoma     BREAST SURGERY  2007,2013     COLONOSCOPY N/A 4/22/2019    Procedure: COMBINED COLONOSCOPY, SINGLE OR MULTIPLE BIOPSY/POLYPECTOMY BY BIOPSY;  Surgeon: Juan Miguel Guan MD;  [facility] OR     COLONOSCOPY N/A 2/24/2020    Procedure: COLONOSCOPY, WITH POLYPECTOMY AND BIOPSY;  Surgeon: Juan Miguel Guan MD;  Location: [facility] OR     left adnexal cystectomy Left 05/07/2002    Dr. Villalta     SALPINGO OOPHORECTOMY,R/L/BOBBY Right 2002    complete Dr. Villalta       Social History     Tobacco Use     Smoking status: Never Smoker     Smokeless tobacco: Never Used   Substance Use Topics     Alcohol use: Yes     Alcohol/week: 0.8 - 1.7 standard drinks     Comment: occas      Family History   Problem Relation Age of " "Onset     Cancer Paternal Grandmother         melanoma     Melanoma Paternal Grandmother      Lipids Father      Osteoporosis Maternal Grandmother      Cerebrovascular Disease Maternal Grandfather      Asthma No family hx of      C.A.D. No family hx of      Colon Cancer No family hx of      Inflammatory Bowel Disease No family hx of          Current Outpatient Medications   Medication Sig Dispense Refill     adapalene (DIFFERIN) 0.1 % external cream At bedtime (Patient not taking: Reported on 6/5/2020) 45 g 11     cetirizine (ZYRTEC) 10 MG tablet Take 1 tablet (10 mg) by mouth daily 21 tablet 0     predniSONE (DELTASONE) 10 MG tablet 2 tabs on day 1&2, 1 tab on day 3&4, 0.5 tab on day 5&6 7 tablet 0     cholecalciferol (VITAMIN  -D) 1000 UNITS capsule Take 4 capsules (4,000 Units) by mouth daily Take one capsule daily. (Patient not taking: Reported on 6/5/2020) 360 capsule 1     No Known Allergies  Recent Labs   Lab Test 03/15/19  0906 07/06/18  0740 06/10/15  0932   LDL  --  60  --    HDL  --  69  --    TRIG  --  46  --    TSH 2.51  --  0.86      BP Readings from Last 3 Encounters:   06/05/20 126/77   02/24/20 113/73   08/22/19 124/82    Wt Readings from Last 3 Encounters:   06/05/20 88.5 kg (195 lb)   08/22/19 90.3 kg (199 lb)   07/18/19 90.4 kg (199 lb 4.7 oz)                    Reviewed and updated as needed this visit by Provider  Tobacco  Allergies  Meds  Med Hx  Surg Hx  Soc Hx        Review of Systems   Constitutional, HEENT, cardiovascular, pulmonary, gi and gu systems are negative, except as otherwise noted.      Objective    There were no vitals taken for this visit.  Estimated body mass index is 27.98 kg/m  as calculated from the following:    Height as of 7/18/19: 1.778 m (5' 10\").    Weight as of an earlier encounter on 6/5/20: 88.5 kg (195 lb).  Physical Exam     GENERAL: Healthy, alert and no distress  EYES: Eyes grossly normal to inspection.  No discharge or erythema, or obvious " scleral/conjunctival abnormalities.  RESP: No audible wheeze, cough, or visible cyanosis.  No visible retractions or increased work of breathing.    SKIN: Visible skin clear. No significant rash, abnormal pigmentation or lesions.  NEURO: Cranial nerves grossly intact.  Mentation and speech appropriate for age.  PSYCH: Mentation appears normal, affect normal/bright, judgement and insight intact, normal speech and appearance well-groomed.      Diagnostic Test Results:  Labs reviewed in Epic        Katty was seen today for throat problem.    Diagnoses and all orders for this visit:    Throat discomfort  -     Discontinue: predniSONE (DELTASONE) 10 MG tablet; 2 tabs on day 1&2, 1 tab on day 3&4, 0.5 tab on day 5&6 (Patient not taking: Reported on 6/5/2020)  -     Discontinue: cetirizine (ZYRTEC) 10 MG tablet; Take 1 tablet (10 mg) by mouth daily (Patient not taking: Reported on 6/5/2020)  -     cetirizine (ZYRTEC) 10 MG tablet; Take 1 tablet (10 mg) by mouth daily  -     predniSONE (DELTASONE) 10 MG tablet; 2 tabs on day 1&2, 1 tab on day 3&4, 0.5 tab on day 5&6    Advised patient differential includes postnasal drip versus GERD.  She denies any tonsillar swelling.  Denies any difficulty breathing dysphasia or shortness of breath.  Denies any difficulty swallowing.  Denies any sore throat or fevers.  Possible allergies she had some R and of facial puffiness.  Advised to start on Zyrtec daily and will give her a small morning dose of prednisone starting with 20 mg dose.  Patient would like to be seen in the clinic advised that she can go to the urgent care for further evaluation.  Patient in agreement.  Video-Visit Details    Type of service:  Video Visit    Video End Time:12:41 pM    Originating Location (pt. Location): Home    Distant Location (provider location):  [facility]     Platform used for Video Visit: AbigailWell    No follow-ups on file.       Keyonna Taylor MD

## 2020-06-05 NOTE — PATIENT INSTRUCTIONS
Go and fill your prescriptions for the Steroid and Zyrtec that your dermatologist gave you and start today.  Follow up with Allergy for patch testing.

## 2020-06-05 NOTE — PROGRESS NOTES
SUBJECTIVE:   Katty Mendoza is a 39 year old female presenting with a chief complaint of   Chief Complaint   Patient presents with     Urgent Care     Allergies     woke up this morning and left side of face was swollen. for the last couple weeks throat closes up around lunch time after eating. strain to swallow .        She is an established patient of [facility].  She presents today with a sensation of something in her throat for the past week.  States every day at noon she can feel something in the back of her throat and it makes her panic.  States feels like she has a piece of food stuck back there, however denies any choking on any food she has been eating.  She does have a history of seasonal allergies, and this past winter saw her dermatologist who wanted her to see an allergist for patch testing.  Her dermatologist also gave her prescription for Zyrtec as well as a short dose of prednisone.  She did not fill these prescriptions.  Also states her eyes have been watery and tearing, no change in her vision.  Does wake up feeling congested and has to blow her nose.  Does sleep with the windows open at night but does not have air conditioning.  Has not tried anything for her symptoms.  Denies any chest pain or shortness of breath.  Has not heard any wheezing.      Review of Systems    ROS: 10 point ROS neg other than the symptoms noted above in the HPI.    Past Medical History:   Diagnosis Date     Anxiety      Colon polyps      Family History   Problem Relation Age of Onset     Cancer Paternal Grandmother         melanoma     Melanoma Paternal Grandmother      Lipids Father      Osteoporosis Maternal Grandmother      Cerebrovascular Disease Maternal Grandfather      Asthma No family hx of      C.A.D. No family hx of      Colon Cancer No family hx of      Inflammatory Bowel Disease No family hx of      Current Outpatient Medications   Medication Sig Dispense Refill     adapalene (DIFFERIN) 0.1 % external cream At  bedtime (Patient not taking: Reported on 6/5/2020) 45 g 11     cetirizine (ZYRTEC) 10 MG tablet Take 1 tablet (10 mg) by mouth daily (Patient not taking: Reported on 6/5/2020) 21 tablet 0     cholecalciferol (VITAMIN  -D) 1000 UNITS capsule Take 4 capsules (4,000 Units) by mouth daily Take one capsule daily. (Patient not taking: Reported on 6/5/2020) 360 capsule 1     predniSONE (DELTASONE) 10 MG tablet 2 tabs on day 1&2, 1 tab on day 3&4, 0.5 tab on day 5&6 (Patient not taking: Reported on 6/5/2020) 7 tablet 0     Social History     Tobacco Use     Smoking status: Never Smoker     Smokeless tobacco: Never Used   Substance Use Topics     Alcohol use: Yes     Alcohol/week: 0.8 - 1.7 standard drinks     Comment: occas        OBJECTIVE  /77   Pulse 77   Temp 96.8  F (36  C) (Skin)   Wt 88.5 kg (195 lb)   SpO2 98%   BMI 27.98 kg/m      Physical Exam   EXAM:  Constitutional: healthy, alert and no distress   Cardiovascular: negative, PMI normal. No lifts, heaves, or thrills. RRR. No murmurs, clicks gallops or rub  Respiratory: negative, Percussion normal. Good diaphragmatic excursion. Lungs clear  Head: Normocephalic. No masses, lesions, tenderness or abnormalities  Neck: Neck supple. No adenopathy. Thyroid symmetric, normal size,  ENT: ENT exam normal, no neck nodes or sinus tenderness  SKIN: no suspicious lesions or rashes         ASSESSMENT:    Allergic rhinitis      PLAN:    Will have her start Zyrtec tonight and start prednisone in the morning. Follow up with allergy for patch testing.     Followup:    If not improving or if condition worsens, follow up with your Primary Care Provider    There are no Patient Instructions on file for this visit.

## 2020-07-16 ENCOUNTER — OFFICE VISIT (OUTPATIENT)
Dept: OBGYN | Facility: CLINIC | Age: 40
End: 2020-07-16
Attending: OBSTETRICS & GYNECOLOGY
Payer: COMMERCIAL

## 2020-07-16 VITALS
HEART RATE: 73 BPM | HEIGHT: 70 IN | BODY MASS INDEX: 27.49 KG/M2 | WEIGHT: 192 LBS | SYSTOLIC BLOOD PRESSURE: 135 MMHG | DIASTOLIC BLOOD PRESSURE: 85 MMHG

## 2020-07-16 DIAGNOSIS — Z01.419 ENCOUNTER FOR GYNECOLOGICAL EXAMINATION WITHOUT ABNORMAL FINDING: Primary | ICD-10-CM

## 2020-07-16 DIAGNOSIS — Z12.4 SCREENING FOR MALIGNANT NEOPLASM OF CERVIX: ICD-10-CM

## 2020-07-16 PROCEDURE — 87624 HPV HI-RISK TYP POOLED RSLT: CPT | Performed by: OBSTETRICS & GYNECOLOGY

## 2020-07-16 PROCEDURE — G0145 SCR C/V CYTO,THINLAYER,RESCR: HCPCS | Performed by: OBSTETRICS & GYNECOLOGY

## 2020-07-16 PROCEDURE — G0463 HOSPITAL OUTPT CLINIC VISIT: HCPCS | Mod: ZF

## 2020-07-16 ASSESSMENT — ANXIETY QUESTIONNAIRES
1. FEELING NERVOUS, ANXIOUS, OR ON EDGE: SEVERAL DAYS
2. NOT BEING ABLE TO STOP OR CONTROL WORRYING: SEVERAL DAYS
GAD7 TOTAL SCORE: 4
7. FEELING AFRAID AS IF SOMETHING AWFUL MIGHT HAPPEN: SEVERAL DAYS
6. BECOMING EASILY ANNOYED OR IRRITABLE: NOT AT ALL
3. WORRYING TOO MUCH ABOUT DIFFERENT THINGS: SEVERAL DAYS
5. BEING SO RESTLESS THAT IT IS HARD TO SIT STILL: NOT AT ALL

## 2020-07-16 ASSESSMENT — PATIENT HEALTH QUESTIONNAIRE - PHQ9
SUM OF ALL RESPONSES TO PHQ QUESTIONS 1-9: 4
5. POOR APPETITE OR OVEREATING: NOT AT ALL

## 2020-07-16 ASSESSMENT — PAIN SCALES - GENERAL: PAINLEVEL: NO PAIN (0)

## 2020-07-16 ASSESSMENT — MIFFLIN-ST. JEOR: SCORE: 1623.22

## 2020-07-16 NOTE — PROGRESS NOTES
"  Progress Note    SUBJECTIVE:  Katty Mendoza is an 39 year old  , who requests a breast and pelvic exam.    Patient is followed by Dr. Bertrand for primary care.    Concerns today include: having some random pains in her breasts, it helps when she wears sports bras and has more compression.  No masses or changes in her breasts, although she states, \"I'm not sure what I'm feeling\".     Completed Gardasil series this past year.    Over the past year has had 2 colonoscopies which found multiple polyps, now plan for annual colonoscopies.  No other family members affected.    Menstrual History:  Menstrual History 2018   LAST MENSTRUAL PERIOD - 2019   Menarche Age - - - - -   Period Cycle (Days) 28 - - - -   Period Duration (Days) 3 - - - -   Method of Contraception None - - - -   Period Pattern Regular - - - -   Menstrual Flow Moderate - - - -   Menstrual Control - - - - -   Dysmenorrhea - - - - -   Reviewed Today Yes - - - -       Last    Lab Results   Component Value Date    PAP NIL 2019     History of abnormal Pap smear: YES - updated in Problem List and Health Maintenance accordingly.  History of HPV+ pap smear, desires annual pap smears    Last   Lab Results   Component Value Date    HPV16 Negative 2019     Last   Lab Results   Component Value Date    HPV18 Negative 2019     Last   Lab Results   Component Value Date    HRHPV Negative 2019       Mammogram current: not applicable    HISTORY:  Prescription Medications as of 2020       Rx Number Disp Refills Start End Last Dispensed Date Next Fill Date Owning Pharmacy    cetirizine (ZYRTEC) 10 MG tablet  21 tablet 0 2020    [id number] - [place], [place] - [address]    Sig: Take 1 tablet (10 mg) by mouth daily    Class: E-Prescribe    Route: Oral      Clinic-Administered Medications as of 2020       Dose " Frequency Start End    lidocaine 1% with EPINEPHrine 1:100,000 injection 3 mL 3 mL ONCE 2020     Route: Intradermal        No Known Allergies  Immunization History   Administered Date(s) Administered     HPV 2007, 2007, 10/23/2008     HPV Quadrivalent 2019, 01/10/2020     HPV9 2020     HepB 2004, 2005     Influenza (IIV3) PF 2005, 2005, 10/23/2008     TD (ADULT, 7+) 1996, 2002, 2005     Tdap (Adacel,Boostrix) 2009       OB History    Para Term  AB Living   0 0 0 0 0 0   SAB TAB Ectopic Multiple Live Births   0 0 0 0 0     Past Medical History:   Diagnosis Date     Anxiety      Colon polyps      Past Surgical History:   Procedure Laterality Date     APPENDECTOMY  2002    Dr. Villalta     BREAST BIOPSY, RT/LT Right     times 2; fibroadenoma     BREAST SURGERY  ,     COLONOSCOPY N/A 2019    Procedure: COMBINED COLONOSCOPY, SINGLE OR MULTIPLE BIOPSY/POLYPECTOMY BY BIOPSY;  Surgeon: Juan Miguel Guan MD;  [facility]     COLONOSCOPY N/A 2020    Procedure: COLONOSCOPY, WITH POLYPECTOMY AND BIOPSY;  Surgeon: Juan Miguel Guan MD;  [facility]     left adnexal cystectomy Left 2002    Dr. Villalta     SALPINGO OOPHORECTOMY,R/L/BOBBY Right     complete Dr. Villalta     Family History   Problem Relation Age of Onset     Cancer Paternal Grandmother         melanoma     Melanoma Paternal Grandmother      Lipids Father      Osteoporosis Maternal Grandmother      Cerebrovascular Disease Maternal Grandfather      Asthma No family hx of      C.A.D. 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   Social Needs     Financial resource strain: None     Food insecurity     Worry: None     Inability: None     Transportation needs     Medical: None     Non-medical: None   Tobacco Use     Smoking status: Never Smoker     Smokeless tobacco: Never Used   Substance and Sexual Activity     Alcohol use: Yes     Alcohol/week: 0.8 - 1.7 standard drinks     Comment: occas      Drug use: No     Sexual activity: Not Currently     Partners: Male     Birth control/protection: Condom, None   Lifestyle     Physical activity     Days per week: None     Minutes per session: None     Stress: None   Relationships     Social connections     Talks on phone: None     Gets together: None     Attends Worship service: None     Active member of club or organization: None     Attends meetings of clubs or organizations: None     Relationship status: None     Intimate partner violence     Fear of current or ex partner: None     Emotionally abused: None     Physically abused: None     Forced sexual activity: None   Other Topics Concern     Parent/sibling w/ CABG, MI or angioplasty before 65F 55M? Not Asked      Service No     Blood Transfusions No     Caffeine Concern No     Occupational Exposure No     Hobby Hazards No     Sleep Concern No     Stress Concern Yes     Comment: needs alone time     Weight Concern No     Special Diet No     Back Care No     Exercise Yes     Bike Helmet Yes     Seat Belt Yes     Self-Exams Yes   Social History Narrative    How much exercise per week? Runs 3-4 times per week    How much calcium per day? Some, milk and yogurt       How much caffeine per day? 1-2 cups per day    How much vitamin D per day? None    Do you/your family wear seatbelts?  Yes    Do you/your family use safety helmets? Yes    Do you/your family use sunscreen? Yes    Do you/your family keep firearms in the home? No    Do you/your family have a smoke  "detector(s)? Yes        Do you feel safe in your home? Yes    Has anyone ever touched you in an unwanted manner? No     Explain          Maida [birth year], 2014 Estela Rodriguez LPN                    5/14/14        Lives in [place]. Enjoys running and road biking.     Dating, heterosexual.     Megan Uriostegui MD           ROS    EXAM:  Blood pressure 135/85, pulse 73, height 1.765 m (5' 9.5\"), weight 87.1 kg (192 lb), last menstrual period 06/20/2020, not currently breastfeeding. Body mass index is 27.95 kg/m .  General appearance: Pleasant female in no acute distress.     BREAST EXAM:  Breast: Without visible skin changes. No dimpling or lesions seen.   Breasts supple, non-tender with palpation, no dominant mass, nodularity, or nipple discharge noted bilaterally. Axillary nodes negative.      PELVIC EXAM:  EG/BUS: Normal genital architecture without lesions, erythema or abnormal secretions Bartholin's, Urethra, [place]'s normal   Urethral meatus: normal    Urethra: no masses, tenderness, or scarring    Bladder: no masses or tenderness    Vagina: moist, pink, rugae with creamy, white and odorless  secretions  Cervix: no lesions and friable with pap  Uterus: retroverted,  and small, smooth, firm, mobile w/o pain  Adnexa: Within normal limits and No masses, nodularity, tenderness  Rectum:anus normal       ASSESSMENT:  Encounter Diagnoses   Name Primary?     Screening for malignant neoplasm of cervix      Encounter for gynecological examination without abnormal finding       39 year old Female Pelvic and Breast Exam  Breast tenderness, likely musculoskeletal     PLAN:   Orders Placed This Encounter   Procedures     Pelvic and Breast Exam Procedure []     Pap Smear Exam []     Pap imaged thin layer screen with HPV - recommended age 30 - 65 years (select HPV order below)     HPV High Risk Types DNA Cervical     Monitor changes in breasts, exam normal.  Wear tight fitting bras, avoid " chaka.    Return in one year/PRN for preventive care or problems/concerns.     Verbalized understanding and agreement with visit plan.    Catrina Edge MD

## 2020-07-16 NOTE — LETTER
"2020       RE: Katty Mendoza   [address]  [facility] [address]     Dear Colleague,    Thank you for referring your patient, Katty Mendoza, to the [facility] at [facility]. Please see a copy of my visit note below.      Progress Note    SUBJECTIVE:  Katty Mendoza is an 39 year old  , who requests a breast and pelvic exam.    Patient is followed by Dr. Bertrand for primary care.    Concerns today include: having some random pains in her breasts, it helps when she wears sports bras and has more compression.  No masses or changes in her breasts, although she states, \"I'm not sure what I'm feeling\".     Completed Gardasil series this past year.    Over the past year has had 2 colonoscopies which found multiple polyps, now plan for annual colonoscopies.  No other family members affected.    Menstrual History:  Menstrual History 2018   LAST MENSTRUAL PERIOD - 2019   Menarche Age - - - - -   Period Cycle (Days) 28 - - - -   Period Duration (Days) 3 - - - -   Method of Contraception None - - - -   Period Pattern Regular - - - -   Menstrual Flow Moderate - - - -   Menstrual Control - - - - -   Dysmenorrhea - - - - -   Reviewed Today Yes - - - -       Last    Lab Results   Component Value Date    PAP NIL 2019     History of abnormal Pap smear: YES - updated in Problem List and Health Maintenance accordingly.  History of HPV+ pap smear, desires annual pap smears    Last   Lab Results   Component Value Date    HPV16 Negative 2019     Last   Lab Results   Component Value Date    HPV18 Negative 2019     Last   Lab Results   Component Value Date    HRHPV Negative 2019       Mammogram current: not applicable    HISTORY:  Prescription Medications as of 2020       Rx Number Disp Refills Start End Last Dispensed Date Next Fill Date Owning " Pharmacy    cetirizine (ZYRTEC) 10 MG tablet  21 tablet 0 2020    [id number] - [place], [place] - [address] AT [address] [place] [address]    Sig: Take 1 tablet (10 mg) by mouth daily    Class: E-Prescribe    Route: Oral      Clinic-Administered Medications as of 2020       Dose Frequency Start End    lidocaine 1% with EPINEPHrine 1:100,000 injection 3 mL 3 mL ONCE 2020     Route: Intradermal        No Known Allergies  Immunization History   Administered Date(s) Administered     HPV 2007, 2007, 10/23/2008     HPV Quadrivalent 2019, 01/10/2020     HPV9 2020     HepB 2004, 2005     Influenza (IIV3) PF 2005, 2005, 10/23/2008     TD (ADULT, 7+) 1996, 2002, 2005     Tdap (Adacel,Boostrix) 2009       OB History    Para Term  AB Living   0 0 0 0 0 0   SAB TAB Ectopic Multiple Live Births   0 0 0 0 0     Past Medical History:   Diagnosis Date     Anxiety      Colon polyps      Past Surgical History:   Procedure Laterality Date     APPENDECTOMY  2002    Dr. Villalta     BREAST BIOPSY, RT/LT Right 2008    times 2; fibroadenoma     BREAST SURGERY  ,     COLONOSCOPY N/A 2019    Procedure: COMBINED COLONOSCOPY, SINGLE OR MULTIPLE BIOPSY/POLYPECTOMY BY BIOPSY;  Surgeon: Juan Miguel Guan MD;  [facility]     COLONOSCOPY N/A 2020    Procedure: COLONOSCOPY, WITH POLYPECTOMY AND BIOPSY;  Surgeon: Juan Miguel Guan MD;  [facility]     left adnexal cystectomy Left 2002    Dr. Villalta     SALPINGO OOPHORECTOMY,R/L/BOBBY Right     complete Dr. Villalta     Family History   Problem Relation Age of Onset     Cancer Paternal Grandmother         melanoma     Melanoma Paternal Grandmother      Lipids Father      Osteoporosis Maternal Grandmother      Cerebrovascular Disease Maternal Grandfather      Asthma No family hx of      C.A.D. 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   Social Needs     Financial resource strain: None     Food insecurity     Worry: None     Inability: None     Transportation needs     Medical: None     Non-medical: None   Tobacco Use     Smoking status: Never Smoker     Smokeless tobacco: Never Used   Substance and Sexual Activity     Alcohol use: Yes     Alcohol/week: 0.8 - 1.7 standard drinks     Comment: occas      Drug use: No     Sexual activity: Not Currently     Partners: Male     Birth control/protection: Condom, None   Lifestyle     Physical activity     Days per week: None     Minutes per session: None     Stress: None   Relationships     Social connections     Talks on phone: None     Gets together: None     Attends Restoration service: None     Active member of club or organization: None     Attends meetings of clubs or organizations: None     Relationship status: None     Intimate partner violence     Fear of current or ex partner: None     Emotionally abused: None     Physically abused: None     Forced sexual activity: None   Other Topics Concern     Parent/sibling w/ CABG, MI or angioplasty before 65F 55M? Not Asked      Service No     Blood Transfusions No     Caffeine Concern No     Occupational Exposure No     Hobby Hazards No     Sleep Concern No     Stress Concern Yes     Comment: needs alone time     Weight Concern No     Special Diet No     Back Care No     Exercise Yes     Bike Helmet Yes     Seat Belt Yes     Self-Exams Yes   Social History Narrative    How much exercise per week? Runs 3-4 times per week    How much calcium per day? Some, milk and yogurt       How much  "caffeine per day? 1-2 cups per day    How much vitamin D per day? None    Do you/your family wear seatbelts?  Yes    Do you/your family use safety helmets? Yes    Do you/your family use sunscreen? Yes    Do you/your family keep firearms in the home? No    Do you/your family have a smoke detector(s)? Yes        Do you feel safe in your home? Yes    Has anyone ever touched you in an unwanted manner? No     Explain          Maida [birth year], 2014 Estela Rodriguez BETHANIE                    5/14/14        Lives in [facility] [place] [place] [employer]. Enjoys running and road biking.     Dating, heterosexual.     Megan Uriostegui MD           ROS    EXAM:  Blood pressure 135/85, pulse 73, height 1.765 m (5' 9.5\"), weight 87.1 kg (192 lb), last menstrual period 06/20/2020, not currently breastfeeding. Body mass index is 27.95 kg/m .  General appearance: Pleasant female in no acute distress.     BREAST EXAM:  Breast: Without visible skin changes. No dimpling or lesions seen.   Breasts supple, non-tender with palpation, no dominant mass, nodularity, or nipple discharge noted bilaterally. Axillary nodes negative.      PELVIC EXAM:  EG/BUS: Normal genital architecture without lesions, erythema or abnormal secretions Bartholin's, Urethra, [place]'s normal   Urethral meatus: normal    Urethra: no masses, tenderness, or scarring    Bladder: no masses or tenderness    Vagina: moist, pink, rugae with creamy, white and odorless  secretions  Cervix: no lesions and friable with pap  Uterus: retroverted,  and small, smooth, firm, mobile w/o pain  Adnexa: Within normal limits and No masses, nodularity, tenderness  Rectum:anus normal       ASSESSMENT:  Encounter Diagnoses   Name Primary?     Screening for malignant neoplasm of cervix      Encounter for gynecological examination without abnormal finding       39 year old Female Pelvic and Breast Exam  Breast tenderness, likely musculoskeletal     PLAN:   Orders Placed This Encounter   Procedures     " Pelvic and Breast Exam Procedure []     Pap Smear Exam []     Pap imaged thin layer screen with HPV - recommended age 30 - 65 years (select HPV order below)     HPV High Risk Types DNA Cervical     Monitor changes in breasts, exam normal.  Wear tight fitting bras, avoid underwire.    Return in one year/PRN for preventive care or problems/concerns.     Verbalized understanding and agreement with visit plan.    Catrina Edge MD

## 2020-07-17 ASSESSMENT — ANXIETY QUESTIONNAIRES: GAD7 TOTAL SCORE: 4

## 2020-07-20 LAB
COPATH REPORT: NORMAL
PAP: NORMAL

## 2020-08-25 NOTE — TELEPHONE ENCOUNTER
2-24 Colonoscopy appointment confirmed. Patient reports no changes since last pre-assessment. Patient reported vomiting prep last time. Advised patient to drink prep slower and to take breaks if she started to feel full. Patient verbalized understanding.   HoLEP, morcellation of prostate

## 2020-11-22 ENCOUNTER — HEALTH MAINTENANCE LETTER (OUTPATIENT)
Age: 40
End: 2020-11-22

## 2020-12-08 ENCOUNTER — TELEPHONE (OUTPATIENT)
Dept: GASTROENTEROLOGY | Facility: CLINIC | Age: 40
End: 2020-12-08

## 2020-12-08 DIAGNOSIS — Z86.0100 HISTORY OF COLONIC POLYPS: Primary | ICD-10-CM

## 2020-12-08 NOTE — TELEPHONE ENCOUNTER
[facility] Call Center    Phone Message    May a detailed message be left on voicemail: yes     Reason for Call: Other: Patient is requesting an order for a colonoscopy.  Dr Guan usually enters them for her.  Please call her once order has been placed so she knows to schedule     Action Taken: Message routed to:  [facility] AE    Travel Screening: Not Applicable

## 2020-12-09 ENCOUNTER — TELEPHONE (OUTPATIENT)
Dept: GASTROENTEROLOGY | Facility: CLINIC | Age: 40
End: 2020-12-09

## 2020-12-09 NOTE — TELEPHONE ENCOUNTER
Left message for patient to call back to schedule colonoscopy with Dr. Guan before he moves over to [facility]. Dr. Guan [facility] openings at the [facility] on [facility].     Procedure: Lower Endoscopy    Preferred Location: [facility]    Scheduling Instructions: If you have not heard from the scheduling office within 2 business days, please call [phone].      Dx: History of colonic polyps [Z86.010]

## 2020-12-09 NOTE — TELEPHONE ENCOUNTER
Patient is scheduled for COLONOSCOPY with Dr. HENDERSON    Spoke with: FILOMENA    Date of Procedure: 01/25/2021    Location: [facility]    Sedation Type CS    Pre-op for Unit [address] MAC NOT NEEDED    (if yes advise patient they will need a pre-op prior to procedure)      Is patient on blood thinners? -NO (If yes- inform patient to follow up with PCP or provider for follow up instructions)     Informed patient they will need an adult  YES    Informed Patient of COVID Test Requirement YES; SCHEDULED 01/22    Preferred Pharmacy for Pre Prescription UNKNOWN    Confirmed Nurse will call to complete assessment YES    Additional comments: NA

## 2021-01-03 DIAGNOSIS — Z11.59 ENCOUNTER FOR SCREENING FOR OTHER VIRAL DISEASES: Primary | ICD-10-CM

## 2021-01-04 ENCOUNTER — OFFICE VISIT (OUTPATIENT)
Dept: DERMATOLOGY | Facility: CLINIC | Age: 41
End: 2021-01-04
Payer: COMMERCIAL

## 2021-01-04 DIAGNOSIS — L81.4 SOLAR LENTIGO: ICD-10-CM

## 2021-01-04 DIAGNOSIS — D22.9 MULTIPLE BENIGN NEVI: ICD-10-CM

## 2021-01-04 DIAGNOSIS — L82.1 SEBORRHEIC KERATOSIS: ICD-10-CM

## 2021-01-04 DIAGNOSIS — L20.9 ATOPIC DERMATITIS, UNSPECIFIED TYPE: Primary | ICD-10-CM

## 2021-01-04 DIAGNOSIS — L70.0 ACNE VULGARIS: ICD-10-CM

## 2021-01-04 DIAGNOSIS — D48.9 NEOPLASM OF UNCERTAIN BEHAVIOR: ICD-10-CM

## 2021-01-04 DIAGNOSIS — J30.2 SEASONAL ALLERGIES: ICD-10-CM

## 2021-01-04 PROCEDURE — 88305 TISSUE EXAM BY PATHOLOGIST: CPT | Performed by: PATHOLOGY

## 2021-01-04 PROCEDURE — 11102 TANGNTL BX SKIN SINGLE LES: CPT | Mod: GC | Performed by: DERMATOLOGY

## 2021-01-04 PROCEDURE — 99213 OFFICE O/P EST LOW 20 MIN: CPT | Mod: 25 | Performed by: DERMATOLOGY

## 2021-01-04 RX ORDER — TRETINOIN 0.25 MG/G
CREAM TOPICAL
Qty: 45 G | Refills: 5 | Status: SHIPPED | OUTPATIENT
Start: 2021-01-04 | End: 2021-07-29

## 2021-01-04 RX ORDER — CETIRIZINE HYDROCHLORIDE 10 MG/1
10 TABLET ORAL DAILY
Qty: 90 TABLET | Refills: 3 | Status: SHIPPED | OUTPATIENT
Start: 2021-01-04

## 2021-01-04 ASSESSMENT — PAIN SCALES - GENERAL: PAINLEVEL: NO PAIN (0)

## 2021-01-04 NOTE — NURSING NOTE
Chief Complaint   Patient presents with     Skin Check     Katty, is here for an annual skin check.      Alf Castañeda EMT

## 2021-01-04 NOTE — LETTER
1/4/2021       RE: Katty Mendoza  [address]  [facility] [address]     Dear Colleague,    Thank you for referring your patient, Katty Mendoza, to the [facility] [place] [facility]. Please see a copy of my visit note below.    [facility] Dermatology Note    Dermatology Problem List:  1. Acne Vulgaris  - Current Rx: tretinoin 0.05% cream  2. Atopic dermatitis, seasonal allergies  - Current Rx: cetirizine 10mg every day, frequent emollient use   3. Keratosis pilaris  4. NUB on the left knee biopsy 1/4/21    Encounter Date: Jan 4, 2021    CC:  Skin check, eye irritation     History of Present Illness:  Ms. Katty Mendoza is a 40 year old female with no personal hx of skin cancer here for skin check and follow-up of atopic dermatitis.    She was last seen in January when she had a biopsy of a mole on the L anterior neck which was bleeding. Pathology showed a benign intradermal nevus. Today she reports that the irritation around her eyes is different as well as she thinks worse.  She describes it as more of a red divot around her eyes.  Her right medial canthus also gets quite dry and irritated which she describes as eczema.  She does have a history of seasonal allergies.  She was going to try to think about taking Zyrtec every day but has not started this yet.  She did have an episode of swelling over the summer and saw an allergist for prick testing but it was negative.  She has never had patch testing.  She uses Aquaphor, Vanicream and CeraVe emollients on a regular basis.  Her nose is frequently runny as well.  She would also like her moles checked today.    Otherwise feeling well today with no additional skin concerns.     Past Medical History:   Patient Active Problem List   Diagnosis     Vitamin D deficiency     Past Medical History:   Diagnosis Date     Anxiety      Colon polyps      Past Surgical History:    Procedure Laterality Date     APPENDECTOMY  05/08/2002    Dr. Villalta     BREAST BIOPSY, RT/LT Right 2008    times 2; fibroadenoma     BREAST SURGERY  2007,2013     COLONOSCOPY N/A 4/22/2019    Procedure: COMBINED COLONOSCOPY, SINGLE OR MULTIPLE BIOPSY/POLYPECTOMY BY BIOPSY;  Surgeon: Juan Miguel Guan MD;  [facility]     COLONOSCOPY N/A 2/24/2020    Procedure: COLONOSCOPY, WITH POLYPECTOMY AND BIOPSY;  Surgeon: Juan Miguel Guan MD;  [facility]     left adnexal cystectomy Left 05/07/2002    Dr. Villalta     SALPINGO OOPHORECTOMY,R/L/BOBBY Right 2002    complete Dr. Villalta       Social History:   reports that she has never smoked. She has never used smokeless tobacco. She reports current alcohol use of about 0.8 - 1.7 standard drinks of alcohol per week. She reports that she does not use drugs.    Family History:  Family History   Problem Relation Age of Onset     Cancer Paternal Grandmother         melanoma     Melanoma Paternal Grandmother      Lipids Father      Osteoporosis Maternal Grandmother      Cerebrovascular Disease Maternal Grandfather      Asthma No family hx of      C.A.D. No family hx of      Colon Cancer No family hx of      Inflammatory Bowel Disease No family hx of      Medications:  Current Outpatient Medications   Medication Sig Dispense Refill     cetirizine (ZYRTEC) 10 MG tablet Take 1 tablet (10 mg) by mouth daily 21 tablet 0     No Known Allergies    Review of Systems:  -Heme/Lymph: no concerning bumps, no bleeding or bruising problems   -Constitutional: The patient denies fatigue, fevers, chills, unintended weight loss, and night sweats.  -HEENT: Patient denies nonhealing oral sores.  -Skin: As above in HPI. No additional skin concerns.    Physical exam:  Vitals: There were no vitals taken for this visit.  GEN: This is a well developed, well-nourished female in no acute distress, in a pleasant mood.    SKIN: Full skin, which includes the head/face, both arms, chest, back, abdomen,both legs,  genitalia and/or groin buttocks, digits and/or nails, was examined.  - Numerous scattered light to medium brown macules and papules with regular pigment networks  - 2 mm dark brown macule on the left medial knee which appears much darker than her other nevi; on dermoscopy there are central globules with peripheral reticular network  - Light brown macules in sun exposed areas  - Dennie-Matt lines of the bilateral eyes   - No other lesions of concern on areas examined.     Impression/Plan:  1. NUB   - L medial knee, atypical nevus vs less likely MM  - Shave biopsy:  After discussion of benefits and risks including but not limited to bleeding/bruising, pain/swelling, infection, scar, incomplete removal, nerve damage/numbness, recurrence, and non-diagnostic biopsy, written consent, verbal consent and photographs were obtained. Time-out was performed. The area was cleaned with isopropyl alcohol. 0.5ml of 1% lidocaine with 1:100,000 epinephrine was injected to obtain adequate anesthesia. A shave biopsy was performed. Hemostasis was achieved with aluminium chloride. Vaseline and a sterile dressing were applied. The patient tolerated the procedure and no complications were noted. The patient was provided with verbal and written post care instructions.    2. Atopic dermatitis, seasonal allergies with eye changes flaring  - Recommended 10mg zyrtec daily; recommended daily use for 12 weeks   - Ok to continue vanicream, cerave, aquaphor prn          Follow-up prn for new or changing lesions.     Staff Involved:  Resident/Staff    Jennie Chicas MD/MPH  Internal Medicine/Dermatology  PGY-4  [phone]    I was present for key portions of the procedure. Tamie Carrillo MD  I, Tamie Carrillo MD, saw this patient with the resident and agree with the resident s findings and plan of care as documented in the resident s note.

## 2021-01-04 NOTE — NURSING NOTE
Lidocaine-epinephrine 1-1:453022 % injection   1.5 mL once for one use, starting 1/4/2021 ending 1/4/2021,  2mL disp, R-0, injection  Injected by Dr. Chicas

## 2021-01-04 NOTE — PATIENT INSTRUCTIONS
Start taking zyrtec (cetirizine) 10mg one a day. We recommend taking this every day for 12 weeks before seeing results.     Continue to use your gentle moisturizers.     You can also continue the tretinoin cream at night.     Wound Care After a Biopsy    What is a skin biopsy?  A skin biopsy allows the doctor to examine a very small piece of tissue under the microscope to determine the diagnosis and the best treatment for the skin condition. A local anesthetic (numbing medicine)  is injected with a very small needle into the skin area to be tested. A small piece of skin is taken from the area. Sometimes a suture (stitch) is used.     What are the risks of a skin biopsy?  I will experience scar, bleeding, swelling, pain, crusting and redness. I may experience incomplete removal or recurrence. Risks of this procedure are excessive bleeding, bruising, infection, nerve damage, numbness, thick (hypertrophic or keloidal) scar and non-diagnostic biopsy.    How should I care for my wound for the first 24 hours?    Keep the wound dry and covered for 24 hours    If it bleeds, hold direct pressure on the area for 15 minutes. If bleeding does not stop then go to the emergency room    Avoid strenuous exercise the first 1-2 days or as your doctor instructs you    How should I care for the wound after 24 hours?    After 24 hours, remove the bandage    You may bathe or shower as normal    If you had a scalp biopsy, you can shampoo as usual and can use shower water to clean the biopsy site daily    Clean the wound twice a day with gentle soap and water    Do not scrub, be gentle    Apply white petroleum/Vaseline after cleaning the wound with a cotton swab or a clean finger, and keep the site covered with a Bandaid /bandage. Bandages are not necessary with a scalp biopsy    If you are unable to cover the site with a Bandaid /bandage, re-apply ointment 2-3 times a day to keep the site moist. Moisture will help with healing    Avoid  strenuous activity for first 1-2 days    Avoid lakes, rivers, pools, and oceans until the stitches are removed or the site is healed    How do I clean my wound?    Wash hands thoroughly with soap or use hand  before all wound care    Clean the wound with gentle soap and water    Apply white petroleum/Vaseline  to wound after it is clean    Replace the Bandaid /bandage to keep the wound covered for the first few days or as instructed by your doctor    If you had a scalp biopsy, warm shower water to the area on a daily basis should suffice    What should I use to clean my wound?     Cotton-tipped applicators (Qtips )    White petroleum jelly (Vaseline ). Use a clean new container and use Q-tips to apply.    Bandaids   as needed    Gentle soap     How should I care for my wound long term?    Do not get your wound dirty    Keep up with wound care for one week or until the area is healed.    A small scab will form and fall off by itself when the area is completely healed. The area will be red and will become pink in color as it heals. Sun protection is very important for how your scar will turn out. Sunscreen with an SPF 30 or greater is recommended once the area is healed.    If you have stitches, stitches need to be removed in 14 days. You may return to our clinic for this or you may have it done locally at your doctor s office.    You should have some soreness but it should be mild and slowly go away over several days. Talk to your doctor about using tylenol for pain,    When should I call my doctor?  If you have increased:     Pain or swelling    Pus or drainage (clear or slightly yellow drainage is ok)    Temperature over 100F    Spreading redness or warmth around wound    When will I hear about my results?  The biopsy results can take 2-3 weeks to come back. The clinic will call you with the results, send you a Survata message, or have you schedule a follow-up clinic or phone time to discuss the results.  Contact our clinics if you do not hear from us in 3 weeks.     Who should I call with questions?    [facility]: [phone]     [facility]: [phone]    For urgent needs outside of business hours call the [facility] at [phone] and ask for the dermatology resident on call

## 2021-01-04 NOTE — PROGRESS NOTES
[facility] Dermatology Note    Dermatology Problem List:  1. Acne Vulgaris  - Current Rx: tretinoin 0.05% cream  2. Atopic dermatitis, seasonal allergies  - Current Rx: cetirizine 10mg every day, frequent emollient use   3. Keratosis pilaris  4. NUB on the left knee biopsy 1/4/21    Encounter Date: Jan 4, 2021    CC:  Skin check, eye irritation     History of Present Illness:  Ms. Katty Mendoza is a 40 year old female with no personal hx of skin cancer here for skin check and follow-up of atopic dermatitis.    She was last seen in January when she had a biopsy of a mole on the L anterior neck which was bleeding. Pathology showed a benign intradermal nevus. Today she reports that the irritation around her eyes is different as well as she thinks worse.  She describes it as more of a red divot around her eyes.  Her right medial canthus also gets quite dry and irritated which she describes as eczema.  She does have a history of seasonal allergies.  She was going to try to think about taking Zyrtec every day but has not started this yet.  She did have an episode of swelling over the summer and saw an allergist for prick testing but it was negative.  She has never had patch testing.  She uses Aquaphor, Vanicream and CeraVe emollients on a regular basis.  Her nose is frequently runny as well.  She would also like her moles checked today.    Otherwise feeling well today with no additional skin concerns.     Past Medical History:   Patient Active Problem List   Diagnosis     Vitamin D deficiency     Past Medical History:   Diagnosis Date     Anxiety      Colon polyps      Past Surgical History:   Procedure Laterality Date     APPENDECTOMY  05/08/2002    Dr. Villalta     BREAST BIOPSY, RT/LT Right 2008    times 2; fibroadenoma     BREAST SURGERY  2007,2013     COLONOSCOPY N/A 4/22/2019    Procedure: COMBINED COLONOSCOPY, SINGLE OR MULTIPLE BIOPSY/POLYPECTOMY BY BIOPSY;  Surgeon: Juan Miguel Guan MD;  Location:  [facility] OR     COLONOSCOPY N/A 2/24/2020    Procedure: COLONOSCOPY, WITH POLYPECTOMY AND BIOPSY;  Surgeon: Juan Miguel Guan MD;  Location: [facility] OR     left adnexal cystectomy Left 05/07/2002    Dr. Villalta     SALPINGO OOPHORECTOMY,R/L/BOBBY Right 2002    complete Dr. Villalta       Social History:   reports that she has never smoked. She has never used smokeless tobacco. She reports current alcohol use of about 0.8 - 1.7 standard drinks of alcohol per week. She reports that she does not use drugs.    Family History:  Family History   Problem Relation Age of Onset     Cancer Paternal Grandmother         melanoma     Melanoma Paternal Grandmother      Lipids Father      Osteoporosis Maternal Grandmother      Cerebrovascular Disease Maternal Grandfather      Asthma No family hx of      C.A.D. No family hx of      Colon Cancer No family hx of      Inflammatory Bowel Disease No family hx of      Medications:  Current Outpatient Medications   Medication Sig Dispense Refill     cetirizine (ZYRTEC) 10 MG tablet Take 1 tablet (10 mg) by mouth daily 21 tablet 0     No Known Allergies    Review of Systems:  -Heme/Lymph: no concerning bumps, no bleeding or bruising problems   -Constitutional: The patient denies fatigue, fevers, chills, unintended weight loss, and night sweats.  -HEENT: Patient denies nonhealing oral sores.  -Skin: As above in HPI. No additional skin concerns.    Physical exam:  Vitals: There were no vitals taken for this visit.  GEN: This is a well developed, well-nourished female in no acute distress, in a pleasant mood.    SKIN: Full skin, which includes the head/face, both arms, chest, back, abdomen,both legs, genitalia and/or groin buttocks, digits and/or nails, was examined.  - Numerous scattered light to medium brown macules and papules with regular pigment networks  - 2 mm dark brown macule on the left medial knee which appears much darker than her other nevi; on dermoscopy there are central globules with peripheral  reticular network  - Light brown macules in sun exposed areas  - Dennie-Matt lines of the bilateral eyes   - No other lesions of concern on areas examined.     Impression/Plan:  1. NUB   - L medial knee, atypical nevus vs less likely MM  - Shave biopsy:  After discussion of benefits and risks including but not limited to bleeding/bruising, pain/swelling, infection, scar, incomplete removal, nerve damage/numbness, recurrence, and non-diagnostic biopsy, written consent, verbal consent and photographs were obtained. Time-out was performed. The area was cleaned with isopropyl alcohol. 0.5ml of 1% lidocaine with 1:100,000 epinephrine was injected to obtain adequate anesthesia. A shave biopsy was performed. Hemostasis was achieved with aluminium chloride. Vaseline and a sterile dressing were applied. The patient tolerated the procedure and no complications were noted. The patient was provided with verbal and written post care instructions.    2. Atopic dermatitis, seasonal allergies with eye changes flaring  - Recommended 10mg zyrtec daily; recommended daily use for 12 weeks   - Ok to continue vanicream, cerave, aquaphor prn          Follow-up prn for new or changing lesions.     Staff Involved:  Resident/Staff    Jennie Chicas MD/MPH  Internal Medicine/Dermatology  PGY-4  [phone]    I was present for key portions of the procedure. Tamie Carrillo MD  I, Tamie Carrillo MD, saw this patient with the resident and agree with the resident s findings and plan of care as documented in the resident s note.

## 2021-01-05 LAB — COPATH REPORT: NORMAL

## 2021-01-18 DIAGNOSIS — Z86.0100 HISTORY OF COLONIC POLYPS: Primary | ICD-10-CM

## 2021-01-18 RX ORDER — BISACODYL 5 MG
5 TABLET, DELAYED RELEASE (ENTERIC COATED) ORAL SEE ADMIN INSTRUCTIONS
Qty: 4 TABLET | Refills: 0 | Status: SHIPPED | OUTPATIENT
Start: 2021-01-18 | End: 2021-05-18

## 2021-01-21 ENCOUNTER — OFFICE VISIT (OUTPATIENT)
Dept: LAB | Facility: CLINIC | Age: 41
End: 2021-01-21
Attending: INTERNAL MEDICINE
Payer: COMMERCIAL

## 2021-01-21 DIAGNOSIS — Z11.59 ENCOUNTER FOR SCREENING FOR OTHER VIRAL DISEASES: ICD-10-CM

## 2021-01-21 LAB
LABORATORY COMMENT REPORT: NORMAL
SARS-COV-2 RNA RESP QL NAA+PROBE: NEGATIVE
SARS-COV-2 RNA RESP QL NAA+PROBE: NORMAL
SPECIMEN SOURCE: NORMAL
SPECIMEN SOURCE: NORMAL

## 2021-01-21 PROCEDURE — 99000 SPECIMEN HANDLING OFFICE-LAB: CPT | Performed by: PATHOLOGY

## 2021-01-21 PROCEDURE — U0005 INFEC AGEN DETEC AMPLI PROBE: HCPCS | Mod: 90 | Performed by: PATHOLOGY

## 2021-01-21 PROCEDURE — U0003 INFECTIOUS AGENT DETECTION BY NUCLEIC ACID (DNA OR RNA); SEVERE ACUTE RESPIRATORY SYNDROME CORONAVIRUS 2 (SARS-COV-2) (CORONAVIRUS DISEASE [COVID-19]), AMPLIFIED PROBE TECHNIQUE, MAKING USE OF HIGH THROUGHPUT TECHNOLOGIES AS DESCRIBED BY CMS-2020-01-R: HCPCS | Mod: 90 | Performed by: PATHOLOGY

## 2021-01-25 ENCOUNTER — HOSPITAL ENCOUNTER (OUTPATIENT)
Facility: AMBULATORY SURGERY CENTER | Age: 41
Discharge: HOME | End: 2021-01-25
Attending: INTERNAL MEDICINE | Admitting: INTERNAL MEDICINE
Payer: COMMERCIAL

## 2021-01-25 VITALS
OXYGEN SATURATION: 98 % | SYSTOLIC BLOOD PRESSURE: 117 MMHG | DIASTOLIC BLOOD PRESSURE: 74 MMHG | HEIGHT: 70 IN | WEIGHT: 185 LBS | RESPIRATION RATE: 17 BRPM | BODY MASS INDEX: 26.48 KG/M2 | TEMPERATURE: 97.9 F | HEART RATE: 67 BPM

## 2021-01-25 LAB — COLONOSCOPY: NORMAL

## 2021-01-25 PROCEDURE — 45385 COLONOSCOPY W/LESION REMOVAL: CPT | Mod: 33

## 2021-01-25 PROCEDURE — 45385 COLONOSCOPY W/LESION REMOVAL: CPT | Mod: 33 | Performed by: INTERNAL MEDICINE

## 2021-01-25 PROCEDURE — 88305 TISSUE EXAM BY PATHOLOGIST: CPT | Mod: GC | Performed by: PATHOLOGY

## 2021-01-25 RX ORDER — FENTANYL CITRATE 50 UG/ML
INJECTION, SOLUTION INTRAMUSCULAR; INTRAVENOUS PRN
Status: DISCONTINUED | OUTPATIENT
Start: 2021-01-25 | End: 2021-01-25 | Stop reason: HOSPADM

## 2021-01-25 RX ORDER — ONDANSETRON 2 MG/ML
4 INJECTION INTRAMUSCULAR; INTRAVENOUS
Status: DISCONTINUED | OUTPATIENT
Start: 2021-01-25 | End: 2021-01-26 | Stop reason: HOSPADM

## 2021-01-25 RX ORDER — LIDOCAINE 40 MG/G
CREAM TOPICAL
Status: DISCONTINUED | OUTPATIENT
Start: 2021-01-25 | End: 2021-01-26 | Stop reason: HOSPADM

## 2021-01-25 ASSESSMENT — MIFFLIN-ST. JEOR: SCORE: 1594.4

## 2021-01-26 LAB — COPATH REPORT: NORMAL

## 2021-01-27 NOTE — RESULT ENCOUNTER NOTE
Pathology from colonoscopy reviewed with patient over the phone. Appendiceal orifice adenomas removed. Recommend repeat colonoscopy in 1 year for surveillance.    Krystle, can you set a reminder? We can do this at [facility] next year. Thanks.    Juan Miguel Guan MD  [facility]  Division of Gastroenterology and Hepatology  [facility] [address], [place] [address]

## 2021-02-23 ENCOUNTER — VIRTUAL VISIT (OUTPATIENT)
Dept: DERMATOLOGY | Facility: CLINIC | Age: 41
End: 2021-02-23
Payer: COMMERCIAL

## 2021-02-23 ENCOUNTER — MYC MEDICAL ADVICE (OUTPATIENT)
Dept: DERMATOLOGY | Facility: CLINIC | Age: 41
End: 2021-02-23

## 2021-02-23 DIAGNOSIS — L30.9 DERMATITIS: Primary | ICD-10-CM

## 2021-02-23 PROCEDURE — 99213 OFFICE O/P EST LOW 20 MIN: CPT | Mod: 95 | Performed by: DERMATOLOGY

## 2021-02-23 RX ORDER — HYDROCORTISONE 25 MG/G
OINTMENT TOPICAL
Qty: 15 G | Refills: 0 | Status: SHIPPED | OUTPATIENT
Start: 2021-02-23 | End: 2021-05-18

## 2021-02-23 ASSESSMENT — PAIN SCALES - GENERAL: PAINLEVEL: NO PAIN (0)

## 2021-02-23 NOTE — NURSING NOTE
Teledermatology Nurse Call Patients:     Are you  in the [facility] at the time of the encounter? yes    Today's visit will be billed to you and your insurance.    A teledermatology visit is not as thorough as an in-person visit and the quality of the photograph sent may not be of the same quality as that taken by the dermatology clinic.    Chief Complaint   Patient presents with     Derm Problem     Dermatitis - seen Dr. Carrillo        1.  Atopic dermatitis, seasonal allergies with eye changes flaring. Dryness and redness under the eyes; severe.     - Patient is taking Zyrtec daily since Jan. 4th 2021; it is helping, but underneath the eye shows some redness. Right side is worse than left.   - Using cerave at night and aquaphor throughout the day.      Patient would like to discuss cosmetic options if needed.       [id number], MEDICAL ASSISTANT

## 2021-02-23 NOTE — PROGRESS NOTES
[facility] Dermatology Note  Encounter Date: Feb 23, 2021  Video (invitation sent by [employer]). Location of teledermatologist: [facility]. Start time: 3:23pm. End time: 3:38pm.    Dermatology Problem List:  1. Acne Vulgaris  - Current Rx: tretinoin 0.05% cream  2. Atopic dermatitis, seasonal allergies  - Current Rx: cetirizine 10mg every day, frequent emollient use   3. Keratosis pilaris  4. Mildly dysplastic nevus, on the left knee biopsy 1/4/21    ____________________________________________    Assessment & Plan:     # Hx of dysplastic nevus, mild, left knee  -recheck 1/2022    # Dermatitis, hx of atopic dermatitis   -hydrocortisone BID for 2 weeks  -Reviewed risks of skin thinning.  -vanicream product or Aquaphor only on top  - do not touch face  -transition to Elidel and or patch testing at follow up  -okay to hold zyrtec  -no tretinoin    # Rhytides  -neocutic eye cream at follow up  -crows feet botox  -consider tear trough deformity filler     Procedures Performed:    None    Follow-up: 2 week(s) virtually (telephone with photos), or earlier for new or changing lesions    Staff:     Candelaria Taylor MD    Department of Dermatology  [facility]: Phone: [phone], Fax:[phone]  [facility]: Phone: [phone], Fax: [phone]      ____________________________________________    CC: Derm Problem    HPI:  Ms. Katty Mendoza is a(n) 40 year old female who presents today as a return patient for rash. Under eyelids, in the setting of atopic dermatitis. Patient has had zyrtec with improvement. Also cerace and aquaphor. Redness, troughs, vertical lines    Patient is otherwise feeling well, without additional concerns.    Labs:     FINAL DIAGNOSIS:   Skin, L medial knee, shave:   - Junctional dysplastic nevus with mild atypia - (see description)      Physical Exam:  Vitals: There were no vitals taken for this visit.  SKIN: Teledermatology photos were reviewed; image quality and interpretability: acceptable. Image date: see upload date. Taken within last 2 weeks per patient. New ones uploaded today   -infraorbital erythema, scaling, possibly lichenification  - No other lesions of concern on areas examined.     Medications:  Current Outpatient Medications   Medication     bisacodyl (DULCOLAX) 5 MG EC tablet     cetirizine (ZYRTEC) 10 MG tablet     polyethylene glycol (GOLYTELY) 236 g suspension     tretinoin (RETIN-A) 0.025 % external cream     Current Facility-Administered Medications   Medication     lidocaine 1% with EPINEPHrine 1:100,000 injection 3 mL      Past Medical/Surgical History:   Patient Active Problem List   Diagnosis     Vitamin D deficiency     Past Medical History:   Diagnosis Date     Anxiety      Colon polyps        CC No referring provider defined for this encounter. on close of this encounter.

## 2021-02-23 NOTE — PATIENT INSTRUCTIONS
[facility] Dermatology Visit    Thank you for allowing us to participate in your care. Your findings, instructions and follow-up plan are as follows:     Use vanicream only beside aquaphor    When should I call my doctor?    If you are worsening or not improving, please, contact us or seek urgent care as noted below.     Who should I call with questions (adults)?    [facility] (adult and pediatric): [phone]     [facility] (adult): [phone]    For urgent needs outside of business hours call the [facility] at [phone] and ask for the dermatology resident on call    If this is a medical emergency and you are unable to reach an ER, Call 911      Who should I call with questions (pediatric)?  [facility]- Pediatric Dermatology  Dr. Majo Wise, Dr. Juliana Bianchi, Dr. Rebecca Wild, Priya Ray, PA  Dr. Jackelin Rivera, Dr. Yoana Beauchamp & Dr. Axel Yip  Non Urgent  Nurse Triage Line; [phone]- Kirsty and Vero RN Care Coordinators   Elza (/Complex ) [phone]    If you need a prescription refill, please contact your pharmacy. Refills are approved or denied by our Physicians during normal business hours, Monday through Fridays  Per office policy, refills will not be granted if you have not been seen within the past year (or sooner depending on your child's condition)    Scheduling Information:  Pediatric Appointment Scheduling and Call Center [phone]  Radiology Scheduling- [phone]  Sedation Unit Scheduling- [phone]  [place] Scheduling- General [phone]; Pediatric Dermatology [phone]  Main  Services: [phone]  Dutch: [phone]  Croatian: [phone]  Hmong/Shane/Anguillan: [phone]  Preadmission Nursing Department Fax Number: [phone] (Fax all pre-operative paperwork to this number)    For urgent  matters arising during evenings, weekends, or holidays that cannot wait for normal business hours please call [phone] and ask for the Dermatology Resident On-Call to be paged.

## 2021-02-23 NOTE — LETTER
2/23/2021         RE: Katty Mendoza  [address]  [place] [address]        Dear Colleague,    Thank you for referring your patient, Katty Mendoza, to the [facility]. Please see a copy of my visit note below.    [facility] Dermatology Note  Encounter Date: Feb 23, 2021  Video (invitation sent by [employer]). Location of teledermatologist: [facility]. Start time: 3:23pm. End time: 3:38pm.    Dermatology Problem List:  1. Acne Vulgaris  - Current Rx: tretinoin 0.05% cream  2. Atopic dermatitis, seasonal allergies  - Current Rx: cetirizine 10mg every day, frequent emollient use   3. Keratosis pilaris  4. Mildly dysplastic nevus, on the left knee biopsy 1/4/21    ____________________________________________    Assessment & Plan:     # Hx of dysplastic nevus, mild, left knee  -recheck 1/2022    # Dermatitis, hx of atopic dermatitis   -hydrocortisone BID for 2 weeks  -Reviewed risks of skin thinning.  -vanicream product or Aquaphor only on top  - do not touch face  -transition to Elidel and or patch testing at follow up  -okay to hold zyrtec  -no tretinoin    # Rhytides  -neocutic eye cream at follow up  -crows feet botox  -consider tear trough deformity filler     Procedures Performed:    None    Follow-up: 2 week(s) virtually (telephone with photos), or earlier for new or changing lesions    Staff:     Candelaria Taylor MD    Department of Dermatology  [facility]: Phone: [phone], Fax:[phone]  [facility]: Phone: [phone], Fax: [phone]      ____________________________________________    CC: Derm Problem    HPI:  Ms. Katty Mendoza is a(n) 40 year old female who presents today as a return patient for rash. Under eyelids, in the setting of atopic dermatitis. Patient has had zyrtec with  improvement. Also cerace and aquaphor. Redness, troughs, vertical lines    Patient is otherwise feeling well, without additional concerns.    Labs:     FINAL DIAGNOSIS:   Skin, L medial knee, shave:   - Junctional dysplastic nevus with mild atypia - (see description)     Physical Exam:  Vitals: There were no vitals taken for this visit.  SKIN: Teledermatology photos were reviewed; image quality and interpretability: acceptable. Image date: see upload date. Taken within last 2 weeks per patient. New ones uploaded today   -infraorbital erythema, scaling, possibly lichenification  - No other lesions of concern on areas examined.     Medications:  Current Outpatient Medications   Medication     bisacodyl (DULCOLAX) 5 MG EC tablet     cetirizine (ZYRTEC) 10 MG tablet     polyethylene glycol (GOLYTELY) 236 g suspension     tretinoin (RETIN-A) 0.025 % external cream     Current Facility-Administered Medications   Medication     lidocaine 1% with EPINEPHrine 1:100,000 injection 3 mL      Past Medical/Surgical History:   Patient Active Problem List   Diagnosis     Vitamin D deficiency     Past Medical History:   Diagnosis Date     Anxiety      Colon polyps        CC No referring provider defined for this encounter. on close of this encounter.        Again, thank you for allowing me to participate in the care of your patient.        Sincerely,        Candelaria Taylor MD

## 2021-02-24 ENCOUNTER — TELEPHONE (OUTPATIENT)
Dept: DERMATOLOGY | Facility: CLINIC | Age: 41
End: 2021-02-24

## 2021-02-24 NOTE — TELEPHONE ENCOUNTER
2/24 Provided phone number [phone] to schedule telephone visit in 2 weeks around 3/9/2021.     Teresa Dean   Procedure    Ortho/Sports Med/Pod/Ent/Eye  [facility]   [phone]

## 2021-02-26 NOTE — TELEPHONE ENCOUNTER
2nd attempt to schedule as noted below. Message left for patient to return call and schedule.    Yola Mcgill  Surgical Specialties Procedure   [facility]  2/26/2021 3:40 PM

## 2021-03-25 ENCOUNTER — VIRTUAL VISIT (OUTPATIENT)
Dept: DERMATOLOGY | Facility: CLINIC | Age: 41
End: 2021-03-25
Payer: COMMERCIAL

## 2021-03-25 DIAGNOSIS — L30.9 DERMATITIS: Primary | ICD-10-CM

## 2021-03-25 PROCEDURE — 99213 OFFICE O/P EST LOW 20 MIN: CPT | Mod: 95 | Performed by: DERMATOLOGY

## 2021-03-25 RX ORDER — PIMECROLIMUS 10 MG/G
CREAM TOPICAL
Qty: 30 G | Refills: 0 | Status: SHIPPED | OUTPATIENT
Start: 2021-03-25 | End: 2021-05-18

## 2021-03-25 NOTE — PATIENT INSTRUCTIONS
[facility] Dermatology Visit    Thank you for allowing us to participate in your care. Your findings, instructions and follow-up plan are as follows:         When should I call my doctor?    If you are worsening or not improving, please, contact us or seek urgent care as noted below.     Who should I call with questions (adults)?    [facility] (adult and pediatric): [phone]     [facility] (adult): [phone]    For urgent needs outside of business hours call the [facility] at [phone] and ask for the dermatology resident on call    If this is a medical emergency and you are unable to reach an ER, Call 911      Who should I call with questions (pediatric)?  [facility]- Pediatric Dermatology  Dr. Majo Wise, Dr. Juliana Bianchi, Dr. Rebecca Wild, Priya Ray, PA  Dr. Jackelin Rivera, Dr. Yoana Beauchamp & Dr. Axel Yip  Non Urgent  Nurse Triage Line; [phone]- Kirsty and Vero RN Care Coordinators   Elza (/Complex ) [phone]    If you need a prescription refill, please contact your pharmacy. Refills are approved or denied by our Physicians during normal business hours, Monday through Fridays  Per office policy, refills will not be granted if you have not been seen within the past year (or sooner depending on your child's condition)    Scheduling Information:  Pediatric Appointment Scheduling and Call Center [phone]  Radiology Scheduling- [phone]  Sedation Unit Scheduling- [phone]  [place] Scheduling- General [phone]; Pediatric Dermatology [phone]  Main  Services: [phone]  Belarusian: [phone]  [place] Islander: [phone]  Hmong/Maori/Greenlandic: [phone]  Preadmission Nursing Department Fax Number: [phone] (Fax all pre-operative paperwork to this number)    For urgent matters arising during evenings,  weekends, or holidays that cannot wait for normal business hours please call [phone] and ask for the Dermatology Resident On-Call to be paged.

## 2021-03-25 NOTE — PROGRESS NOTES
Teledermatology Nurse Call Patients:     Are you  in the state [facility] at the time of the encounter? yes    Today's visit will be billed to you and your insurance.    A teledermatology visit is not as thorough as an in-person visit and the quality of the photograph sent may not be of the same quality as that taken by the dermatology clinic.                                                                                                                                                                                                                        [facility] Dermatology Note  Encounter Date: Mar 25, 2021  Store-and-Forward and Telephone ([phone]  ). Location of teledermatologist: [facility].  Start time: 12:00pm. End time: 12:23pm.    Dermatology Problem List:  1. Acne Vulgaris  - Current Rx: tretinoin 0.05% cream  2. Atopic dermatitis, seasonal allergies  - Current Rx: cetirizine 10mg every day, frequent emollient use   3. Keratosis pilaris  4. Mildly dysplastic nevus, on the left knee biopsy 1/4/21     ____________________________________________     Assessment & Plan:      # Hx of dysplastic nevus, mild, left knee  -recheck 1/2022     # Dermatitis, hx of atopic dermatitis-resolved   -hydrocortisone hold  -Elidel twice daily as needed, reviewed black box warning    # Rhytides-  -cheeks recommend restlane Lyft 1 syringe to start  -2 weeks later restylane to tear troughs.   -then     Procedures Performed:    None    Follow-up: 4 week(s) virtually (telephone with photos), or earlier for new or changing lesions    Staff:     Candelaria Taylor MD    Department of Dermatology  [facility]: Phone: [phone], Fax:[phone]  [facility]: Phone: [phone], Fax: [phone]      ____________________________________________    CC: Derm  Problem    HPI:  Ms. Katty Mendoza is a(n) 40 year old female who presents today as a return patient for rash which is now improved.     Tear troughs bothersome    Patient is otherwise feeling well, without additional skin concerns.    Labs Reviewed:   N/A    Physical Exam:  Vitals: There were no vitals taken for this visit.  SKIN: Teledermatology photos were reviewed; image quality and interpretability: acceptable. Image date: yesterday.  - cheek volume loss  -tear trough deformity.   - No other lesions of concern on areas examined.     Medications:  Current Outpatient Medications   Medication     bisacodyl (DULCOLAX) 5 MG EC tablet     cetirizine (ZYRTEC) 10 MG tablet     hydrocortisone 2.5 % ointment     polyethylene glycol (GOLYTELY) 236 g suspension     tretinoin (RETIN-A) 0.025 % external cream     Current Facility-Administered Medications   Medication     lidocaine 1% with EPINEPHrine 1:100,000 injection 3 mL      Past Medical/Surgical History:   Patient Active Problem List   Diagnosis     Vitamin D deficiency     Past Medical History:   Diagnosis Date     Anxiety      Colon polyps        CC No referring provider defined for this encounter. on close of this encounter.

## 2021-03-25 NOTE — LETTER
3/25/2021         RE: Katty Mendoza  [address]  [place] [place] [facility] [address]        Dear Colleague,    Thank you for referring your patient, Katty Mendoza, to the [facility]. Please see a copy of my visit note below.    Teledermatology Nurse Call Patients:     Are you  in the state [facility] at the time of the encounter? yes    Today's visit will be billed to you and your insurance.    A teledermatology visit is not as thorough as an in-person visit and the quality of the photograph sent may not be of the same quality as that taken by the dermatology clinic.                                                                                                                                                                                                                        [facility] Dermatology Note  Encounter Date: Mar 25, 2021  Store-and-Forward and Telephone ([phone]  ). Location of teledermatologist: [facility].  Start time: 12:00pm. End time: 12:23pm.    Dermatology Problem List:  1. Acne Vulgaris  - Current Rx: tretinoin 0.05% cream  2. Atopic dermatitis, seasonal allergies  - Current Rx: cetirizine 10mg every day, frequent emollient use   3. Keratosis pilaris  4. Mildly dysplastic nevus, on the left knee biopsy 1/4/21     ____________________________________________     Assessment & Plan:      # Hx of dysplastic nevus, mild, left knee  -recheck 1/2022     # Dermatitis, hx of atopic dermatitis-resolved   -hydrocortisone hold  -Elidel twice daily as needed, reviewed black box warning    # Rhytides-  -cheeks recommend restlane Lyft 1 syringe to start  -2 weeks later restylane to tear troughs.   -then     Procedures Performed:    None    Follow-up: 4 week(s) virtually (telephone with photos), or earlier for new or changing lesions    Staff:     Candelaria Taylor MD    Department of  Dermatology  [facility]: Phone: [phone], Fax:[phone]  [facility]: Phone: [phone], Fax: [phone]      ____________________________________________    CC: Derm Problem    HPI:  Ms. Katty Mendoza is a(n) 40 year old female who presents today as a return patient for rash which is now improved.     Tear troughs bothersome    Patient is otherwise feeling well, without additional skin concerns.    Labs Reviewed:   N/A    Physical Exam:  Vitals: There were no vitals taken for this visit.  SKIN: Teledermatology photos were reviewed; image quality and interpretability: acceptable. Image date: yesterday.  - cheek volume loss  -tear trough deformity.   - No other lesions of concern on areas examined.     Medications:  Current Outpatient Medications   Medication     bisacodyl (DULCOLAX) 5 MG EC tablet     cetirizine (ZYRTEC) 10 MG tablet     hydrocortisone 2.5 % ointment     polyethylene glycol (GOLYTELY) 236 g suspension     tretinoin (RETIN-A) 0.025 % external cream     Current Facility-Administered Medications   Medication     lidocaine 1% with EPINEPHrine 1:100,000 injection 3 mL      Past Medical/Surgical History:   Patient Active Problem List   Diagnosis     Vitamin D deficiency     Past Medical History:   Diagnosis Date     Anxiety      Colon polyps        CC No referring provider defined for this encounter. on close of this encounter.        Again, thank you for allowing me to participate in the care of your patient.        Sincerely,        Candelaria Taylor MD

## 2021-03-25 NOTE — NURSING NOTE
Katty Mendoza's goals for this visit include:   Chief Complaint   Patient presents with     Derm Problem     dermatitis around eyes        She requests these members of her care team be copied on today's visit information: no    PCP: No Ref-Primary, Physician    Referring Provider:  No referring provider defined for this encounter.    There were no vitals taken for this visit.    Do you need any medication refills at today's visit? No..Yola Diallo RN

## 2021-03-30 ENCOUNTER — TELEPHONE (OUTPATIENT)
Dept: DERMATOLOGY | Facility: CLINIC | Age: 41
End: 2021-03-30

## 2021-03-30 NOTE — TELEPHONE ENCOUNTER
[facility] Call Center    Phone Message    May a detailed message be left on voicemail: yes     Reason for Call: Other: Patient needs to schedule procedure per her last visit. Patient would like a call back to schedule. Thank you.    Action Taken: Message routed to:  Adult Clinics: Dermatology p [id number]    Travel Screening: Not Applicable

## 2021-03-30 NOTE — TELEPHONE ENCOUNTER
Pt called and scheduled with  for filler on 4/30/2021. Pt states for now she just wants to do filler to her cheeks.  Filler information reviewed with pt as well as cost for 1 vial of Restylane silk which is what was mentioned in the last office visit notes. Pt verbalized understanding and had no further questions...Yola Diallo RN        Filler Information:    I will have pain, bruising, redness, and swelling after the procedure and lasting for approximately 1-3 weeks. Risks are lumps/bumps, skin discoloration, bleeding, numbness, infection, granuloma formation, scar, ulceration, under correction, over correction and rarely, risks of stroke and blindness. Multiple treatments may be required.        Dermal fillers are injected into the skin to soften crease or folds, support areas of volume loss or contour specific facial areas.     You may experience a mild to moderate amount of stinging or aching sensation post injection.    To ensure an even correction, the physician will massage the area treated, which may cause a temporary amount of redness to your skin.    Bruising at the site of injection is common and may last two weeks    Temporary minimal to moderate swelling can be expected, which should gradually improve following injection    It is normal to experience some tenderness at the treatment site for a few days    After treatment care instructions:    Apply an ice pack or cold compress to the injection area after treatment to help reduce swelling.    Keep your head elevated(even while sleeping) as much as possible and avoid sleeping on your side or stomach    No alcohol consumption or exercise for the first 24 hours after treatment.    Do not touch the treated area for 6 hours    You may use make-up the following day    You may return to normal/routine activities but you should check with your physician for their recommendation     Avoid excessive scrubbing or rubbing of the injection area    If you  have previously suffered from cold sores, there is a risk that the needle punctures around the mouth/lips could contribute to another recurrence    Immediately report to your physician if you have any of the following:    Delayed swelling happening 7-14 days after treatment    Numbness lasting 3-4 days    Muscle weakness in the area of injection    Severe pain    Dusky discoloration of one half of the face    Changes in vision or eye pain    Cold sore    Who should I call with questions?    [facility]: [phone]     [facility]: [phone]    For urgent needs outside of business hours call the [facility] at [phone] and ask for the resident on call

## 2021-05-18 ENCOUNTER — OFFICE VISIT (OUTPATIENT)
Dept: DERMATOLOGY | Facility: CLINIC | Age: 41
End: 2021-05-18

## 2021-05-18 ENCOUNTER — OFFICE VISIT (OUTPATIENT)
Dept: NURSING | Facility: CLINIC | Age: 41
End: 2021-05-18
Payer: COMMERCIAL

## 2021-05-18 DIAGNOSIS — L70.0 ACNE VULGARIS: Primary | ICD-10-CM

## 2021-05-18 DIAGNOSIS — L30.9 DERMATITIS: ICD-10-CM

## 2021-05-18 DIAGNOSIS — L98.8 RHYTIDES: Primary | ICD-10-CM

## 2021-05-18 DIAGNOSIS — R23.8 FACIAL VOLUME DEPLETION: ICD-10-CM

## 2021-05-18 PROCEDURE — 96999 UNLISTED SPEC DERM SVC/PX: CPT | Performed by: DERMATOLOGY

## 2021-05-18 PROCEDURE — 99207 PR NO CHARGE NURSE ONLY: CPT | Performed by: DERMATOLOGY

## 2021-05-18 RX ORDER — HYDROCORTISONE 25 MG/G
OINTMENT TOPICAL
Qty: 15 G | Refills: 0 | Status: SHIPPED | OUTPATIENT
Start: 2021-05-18 | End: 2022-05-24

## 2021-05-18 RX ORDER — TRETINOIN 0.5 MG/G
CREAM TOPICAL AT BEDTIME
Qty: 45 G | Refills: 11 | Status: SHIPPED | OUTPATIENT
Start: 2021-05-18 | End: 2022-05-24

## 2021-05-18 ASSESSMENT — PAIN SCALES - GENERAL: PAINLEVEL: NO PAIN (0)

## 2021-05-18 NOTE — PATIENT INSTRUCTIONS
Filler Information:    I will have pain, bruising, redness, and swelling after the procedure and lasting for approximately 1-3 weeks. Risks are lumps/bumps, skin discoloration, bleeding, numbness, infection, granuloma formation, scar, ulceration, under correction, over correction and rarely, risks of stroke and blindness. Multiple treatments may be required.        Dermal fillers are injected into the skin to soften crease or folds, support areas of volume loss or contour specific facial areas.     You may experience a mild to moderate amount of stinging or aching sensation post injection.    To ensure an even correction, the physician will massage the area treated, which may cause a temporary amount of redness to your skin.    Bruising at the site of injection is common and may last two weeks    Temporary minimal to moderate swelling can be expected, which should gradually improve following injection    It is normal to experience some tenderness at the treatment site for a few days    After treatment care instructions:    Apply an ice pack or cold compress to the injection area after treatment to help reduce swelling.    Keep your head elevated(even while sleeping) as much as possible and avoid sleeping on your side or stomach    No alcohol consumption or exercise for the first 24 hours after treatment.    Do not touch the treated area for 6 hours    You may use make-up the following day    You may return to normal/routine activities but you should check with your physician for their recommendation     Avoid excessive scrubbing or rubbing of the injection area    If you have previously suffered from cold sores, there is a risk that the needle punctures around the mouth/lips could contribute to another recurrence    Immediately report to your physician if you have any of the following:    Delayed swelling happening 7-14 days after treatment    Numbness lasting 3-4 days    Muscle weakness in the area of  injection    Severe pain    Dusky discoloration of one half of the face    Changes in vision or eye pain    Cold sore    Who should I call with questions?    [facility]: [phone]     [facility]: [phone]    For urgent needs outside of business hours call the [facility] at [phone] and ask for the resident on call

## 2021-05-18 NOTE — NURSING NOTE
The following medication was applied to skin:    MEDICATION: Benzocaine 20%/Lidocaine 8%/Tetracaine 4%  ROUTE: Topical   SITE: lower face  DOSE: ~1 g  LOT #: [id number]  : [employer]  EXPIRATION DATE: 9/26/21  Was there drug waste? No  Multi-dose vial: Yes    Clair Galvan RN

## 2021-05-18 NOTE — LETTER
5/18/2021         RE: Katty Mendoza  [address]  [place] [address]        Dear Colleague,    Thank you for referring your patient, Katty Mendoza, to the [facility]. Please see a copy of my visit note below.    Soft Tissue Augmentation Procedure Note: Cosmetic      Dermatology Problem List:  1. Acne Vulgaris  - Current Rx: tretinoin 0.05% cream  2. Atopic dermatitis, seasonal allergies  - Current Rx: cetirizine 10mg every day, frequent emollient use   - Elidel: negative rxn.  3. Keratosis pilaris  4. Mildly dysplastic nevus, on the left knee biopsy 1/4/21    Procedure Date: 5/18/2021    Attending Staff: Candelaria Taylor MD    Resident: N/A    Assistant: Eulalia Gray CMA    Diagnosis: Facial   volume depletion    Location: cheeks  Product: Restylane Lyft  Amount: 1 cc  Lot #: [id number]  Exp Date: 3/31/2023    Description of Operation/Procedure:   The nature and purpose of the procedure, associated risks, possible consequences and complications, and alternative methods of treatment were explained in detail including but not limited to bruising, pain, redness,lumps/bumps, granuloma formation, scar blindness, stroke, ulceration, ischemia, under correction, over correction, swelling, possible need for multiple treatments, infection, granuloma, pain, dyspigmentation, numbness, weakness or tingling were explained to the patient. The patient verbalized understanding. Photo consent and signed informed consent were obtained.Time-out was performed and patient denied history of severe allergy to bees.  Denies recent upcoming dental procedures or covid vaccine in the last 2 weeks.      The facial areas were cleansed with tehcnicare and alcohol and injections were performed.  The patient tolerated the procedure well and there were no complications. Ice was provided post-procedure. The patient was provided after care instructions and will follow-up in 1 week.     The patient will  pay cosmetic fee today.    Plan for 10% azelaic acid after next visit. For under eyes after tear trough filler    Staff:  Scribe/Staff     Candelaria Taylor MD    Scribe Disclosure:   I, Angeline Gee, am serving as a scribe to document services personally performed by this physician, Dr. Candelaria Taylor, based on data collection and the provider's statements to me.     Provider Disclosure:   The documentation recorded by the scribe accurately reflects the services I personally performed and the decisions made by me.    Candelaria Taylor MD    Department of Dermatology  [facility]: Phone: [phone], Fax:[phone]  [facility]: Phone: [phone], Fax: [phone]          Again, thank you for allowing me to participate in the care of your patient.        Sincerely,        Candelaria Taylor MD

## 2021-05-18 NOTE — PROGRESS NOTES
Soft Tissue Augmentation Procedure Note: Cosmetic      Dermatology Problem List:  1. Acne Vulgaris  - Current Rx: tretinoin 0.05% cream  2. Atopic dermatitis, seasonal allergies  - Current Rx: cetirizine 10mg every day, frequent emollient use   - Elidel: negative rxn.  3. Keratosis pilaris  4. Mildly dysplastic nevus, on the left knee biopsy 1/4/21    Procedure Date: 5/18/2021    Attending Staff: Candelaria Taylor MD    Resident: N/A    Assistant: Eulalia Gray CMA    Diagnosis: Facial   volume depletion    Location: cheeks  Product: Restylane Lyft  Amount: 1 cc  Lot #: [id number]  Exp Date: 3/31/2023    Description of Operation/Procedure:   The nature and purpose of the procedure, associated risks, possible consequences and complications, and alternative methods of treatment were explained in detail including but not limited to bruising, pain, redness,lumps/bumps, granuloma formation, scar blindness, stroke, ulceration, ischemia, under correction, over correction, swelling, possible need for multiple treatments, infection, granuloma, pain, dyspigmentation, numbness, weakness or tingling were explained to the patient. The patient verbalized understanding. Photo consent and signed informed consent were obtained.Time-out was performed and patient denied history of severe allergy to bees.  Denies recent upcoming dental procedures or covid vaccine in the last 2 weeks.      The facial areas were cleansed with tehcnicare and alcohol and injections were performed.  The patient tolerated the procedure well and there were no complications. Ice was provided post-procedure. The patient was provided after care instructions and will follow-up in 1 week.     The patient will pay cosmetic fee today.    Plan for 10% azelaic acid after next visit. For under eyes after tear trough filler    Staff:  Scribe/Staff     Candelaria Taylor MD    Scribe Disclosure:   Angeline RIOS, am serving as a scribe to document services personally performed  by this physician, Dr. Candelaria Taylor, based on data collection and the provider's statements to me.     Provider Disclosure:   The documentation recorded by the scribe accurately reflects the services I personally performed and the decisions made by me.    Candelaria Taylor MD    Department of Dermatology  [facility]: Phone: [phone], Fax:[phone]  [facility]: Phone: [phone], Fax: [phone]

## 2021-05-18 NOTE — NURSING NOTE
Katty Mendoza's goals for this visit include:   Chief Complaint   Patient presents with     Procedure     Filler     Medication Change     Would like stronger Retin-A prescription.  Using 0.025%.       She requests these members of her care team be copied on today's visit information: n/a    PCP: No Ref-Primary, Physician    Referring Provider:  No referring provider defined for this encounter.    There were no vitals taken for this visit.    Do you need any medication refills at today's visit? Yes, hydrocortisone and stronger strength of Retin-A.    Clair Galvan RN

## 2021-05-19 ENCOUNTER — VIRTUAL VISIT (OUTPATIENT)
Dept: DERMATOLOGY | Facility: CLINIC | Age: 41
End: 2021-05-19
Payer: COMMERCIAL

## 2021-05-19 DIAGNOSIS — R23.8 FACIAL VOLUME DEPLETION: Primary | ICD-10-CM

## 2021-05-19 PROCEDURE — 99207 PR NO CHARGE LOS: CPT | Performed by: DERMATOLOGY

## 2021-05-19 NOTE — PROGRESS NOTES
Teledermatology Nurse Call Patients:     Are you  in the state [facility] at the time of the encounter? Yes    Today's visit will be billed to you and your insurance.    A teledermatology visit is not as thorough as an in-person visit and the quality of the photograph sent may not be of the same quality as that taken by the dermatology clinic.    Lauren Weiner LPN    [facility] Dermatology Note- n o charge dermatology cosmetic follow up  Encounter Date: May 19, 2021  Store-and-Forward and Telephone ([phone]). Location of teledermatologist: [facility].  Start time: 10:14am. End time: 10:21am.    Dermatology Problem List:  1. Acne Vulgaris  - Current Rx: tretinoin 0.05% cream  2. Atopic dermatitis, seasonal allergies  - Current Rx: cetirizine 10mg every day, frequent emollient use   - Elidel: negative rxn.  3. Keratosis pilaris  4. Mildly dysplastic nevus, on the left knee biopsy 1/4/21       ____________________________________________    Assessment & Plan:   # Hx of dysplastic nevus, mild, left knee  -recheck 1/2022     # Dermatitis, hx of atopic dermatitis-resolved   -hydrocortisone hold  -Elidel hold     # Rhytides-jowls bothersome, consider botox of the forehead and also kybella of the lower  -cheeks recommend restlane Lyft 1 syringe to start  -pending tear trough filler    Procedures Performed:    None    Follow-up: 2 months lower face, filler possibly on the JAWLINE    Staff:     Candelaria Taylor MD    Department of Dermatology  [facility]: Phone: [phone], Fax:[phone]  [facility]: Phone: [phone], Fax: [phone]      ____________________________________________    CC: Follow Up (post filler )    HPI:  Ms. Katty Mendoza is a(n) 40 year old female who presents today as a return patient for filler. One bruise right side,  otherwise doing well.     Patient is otherwise feeling well, without additional skin concerns.    Labs Reviewed:  NA    Physical Exam:  Vitals: There were no vitals taken for this visit.  SKIN: Teledermatology photos were reviewed; image quality and interpretability:  acceptable. Image date: today.  - marionette lines and jowls  - No other lesions of concern on areas examined.     Medications:  Current Outpatient Medications   Medication     cetirizine (ZYRTEC) 10 MG tablet     hydrocortisone 2.5 % ointment     tretinoin (RETIN-A) 0.025 % external cream     tretinoin (RETIN-A) 0.05 % external cream     No current facility-administered medications for this visit.       Past Medical/Surgical History:   Patient Active Problem List   Diagnosis     Vitamin D deficiency     Past Medical History:   Diagnosis Date     Anxiety      Colon polyps        CC No referring provider defined for this encounter. on close of this encounter.

## 2021-05-19 NOTE — PATIENT INSTRUCTIONS
[facility] Dermatology Visit    Thank you for allowing us to participate in your care. Your findings, instructions and follow-up plan are as follows:         When should I call my doctor?    If you are worsening or not improving, please, contact us or seek urgent care as noted below.     Who should I call with questions (adults)?    [facility] (adult and pediatric): [phone]     [facility] (adult): [phone]    For urgent needs outside of business hours call the [facility] at [phone] and ask for the dermatology resident on call    If this is a medical emergency and you are unable to reach an ER, Call 911      Who should I call with questions (pediatric)?  [facility]- Pediatric Dermatology  Dr. Majo Wise, Dr. Juliana Bianchi, Dr. Rebecca Wild, Priya Ray, PA  Dr. Jackelin Rivera, Dr. Yoana Beauchamp & Dr. Axel Yip  Non Urgent  Nurse Triage Line; [phone]- Kirsty and Vero RN Care Coordinators   [facility] (/Complex ) [phone]    If you need a prescription refill, please contact your pharmacy. Refills are approved or denied by our Physicians during normal business hours, Monday through Fridays  Per office policy, refills will not be granted if you have not been seen within the past year (or sooner depending on your child's condition)    Scheduling Information:  Pediatric Appointment Scheduling and Call Center [phone]  Radiology Scheduling- [phone]  Sedation Unit Scheduling- [phone]  [place] Scheduling- General [phone]; Pediatric Dermatology [phone]  Main  Services: [phone]  Japanese: [phone]  Icelandic: [phone]  Hmong/Ukrainian/Belarusian: [phone]  Preadmission Nursing Department Fax Number: [phone] (Fax all pre-operative paperwork to this number)    For urgent matters arising during evenings,  weekends, or holidays that cannot wait for normal business hours please call [phone] and ask for the Dermatology Resident On-Call to be paged.

## 2021-05-19 NOTE — LETTER
5/19/2021         RE: Katty Mendoza  [address]  [facility] [address]        Dear Colleague,    Thank you for referring your patient, Katty Mendoza, to the [facility]. Please see a copy of my visit note below.    Teledermatology Nurse Call Patients:     Are you  in the [facility] at the time of the encounter? Yes    Today's visit will be billed to you and your insurance.    A teledermatology visit is not as thorough as an in-person visit and the quality of the photograph sent may not be of the same quality as that taken by the dermatology clinic.    Lauren Weiner LPN    [facility] Dermatology Note- n o charge dermatology cosmetic follow up  Encounter Date: May 19, 2021  [employer] and Telephone ([phone]). Location of teledermatologist: [facility].  Start time: 10:14am. End time: 10:21am.    Dermatology Problem List:  1. Acne Vulgaris  - Current Rx: tretinoin 0.05% cream  2. Atopic dermatitis, seasonal allergies  - Current Rx: cetirizine 10mg every day, frequent emollient use   - Elidel: negative rxn.  3. Keratosis pilaris  4. Mildly dysplastic nevus, on the left knee biopsy 1/4/21       ____________________________________________    Assessment & Plan:   # Hx of dysplastic nevus, mild, left knee  -recheck 1/2022     # Dermatitis, hx of atopic dermatitis-resolved   -hydrocortisone hold  -Elidel hold     # Rhytides-jowls bothersome, consider botox of the forehead and also kybella of the lower  -cheeks recommend restlane Lyft 1 syringe to start  -pending tear trough filler    Procedures Performed:    None    Follow-up: 2 months lower face, filler possibly on the JAWLINE    Staff:     Candelaria Taylor MD    Department of Dermatology  [facility]: Phone: [phone], Fax:[phone]  [facility]: Phone: [phone], Fax: [phone]      ____________________________________________    CC: Follow Up (post filler )    HPI:  Ms. Katty Mendoza is a(n) 40 year old female who presents today as a return patient for filler. One bruise right side, otherwise doing well.     Patient is otherwise feeling well, without additional skin concerns.    Labs Reviewed:  NA    Physical Exam:  Vitals: There were no vitals taken for this visit.  SKIN: Teledermatology photos were reviewed; image quality and interpretability:  acceptable. Image date: today.  - marionette lines and jowls  - No other lesions of concern on areas examined.     Medications:  Current Outpatient Medications   Medication     cetirizine (ZYRTEC) 10 MG tablet     hydrocortisone 2.5 % ointment     tretinoin (RETIN-A) 0.025 % external cream     tretinoin (RETIN-A) 0.05 % external cream     No current facility-administered medications for this visit.       Past Medical/Surgical History:   Patient Active Problem List   Diagnosis     Vitamin D deficiency     Past Medical History:   Diagnosis Date     Anxiety      Colon polyps        CC No referring provider defined for this encounter. on close of this encounter.        Again, thank you for allowing me to participate in the care of your patient.        Sincerely,        Candelaria Taylor MD

## 2021-06-02 ENCOUNTER — OFFICE VISIT (OUTPATIENT)
Dept: OPHTHALMOLOGY | Facility: CLINIC | Age: 41
End: 2021-06-02
Payer: COMMERCIAL

## 2021-06-02 DIAGNOSIS — H02.832 DERMATOCHALASIS OF UPPER AND LOWER EYELIDS OF BOTH EYES: Primary | ICD-10-CM

## 2021-06-02 DIAGNOSIS — H02.831 DERMATOCHALASIS OF UPPER AND LOWER EYELIDS OF BOTH EYES: Primary | ICD-10-CM

## 2021-06-02 DIAGNOSIS — H02.835 DERMATOCHALASIS OF UPPER AND LOWER EYELIDS OF BOTH EYES: Primary | ICD-10-CM

## 2021-06-02 DIAGNOSIS — H02.834 DERMATOCHALASIS OF UPPER AND LOWER EYELIDS OF BOTH EYES: Primary | ICD-10-CM

## 2021-06-02 PROCEDURE — 96999 UNLISTED SPEC DERM SVC/PX: CPT | Performed by: OPHTHALMOLOGY

## 2021-06-02 RX ORDER — PIMECROLIMUS 10 MG/G
CREAM TOPICAL
Status: ON HOLD | COMMUNITY
Start: 2021-03-25 | End: 2022-03-24

## 2021-06-02 ASSESSMENT — CONF VISUAL FIELD
METHOD: COUNTING FINGERS
OS_NORMAL: 1
OD_NORMAL: 1

## 2021-06-02 ASSESSMENT — TONOMETRY
OD_IOP_MMHG: 15
OS_IOP_MMHG: 16
IOP_METHOD: ICARE

## 2021-06-02 ASSESSMENT — VISUAL ACUITY
METHOD: SNELLEN - LINEAR
OS_SC: 20/15
OD_SC+: -1
OD_SC: 20/15
OS_SC+: -1

## 2021-06-02 NOTE — NURSING NOTE
Chief Complaints and History of Present Illnesses   Patient presents with     Filler       Chief Complaint(s) and History of Present Illness(es)     Filler               Comments     Patient referred by Dr. Taylor for under eye filler consult. On 05/18/2021 Dr. Taylor did  Restylane Lyft  patients cheeks and recommended to see Dr. Good for under eye filler. Patient has never had under eye filler. Patient has noticed that her eyes look hollowed out and tired over the past few years.                 Yadiel Eduardo, Ophthalmic Assistant

## 2021-06-02 NOTE — Clinical Note
Thanks for sending Katty! She was so nice and so reasonable. I think got a nice result for her! She is coming to see you Friday. Can you get a post picture for me so she doesn't have to take time off work? Thanks!

## 2021-06-02 NOTE — Clinical Note
Satish Perea, this was a cosmetic consult and she did filler same day so I think no charge clinic visit. I put procedure, but I don't know if I am suppose to use a cosmetic LOS, and how to do that. THanks!

## 2021-06-02 NOTE — PROGRESS NOTES
Chief Complaint(s) and History of Present Illness(es)     Filler               Comments     Patient referred by Dr. Taylor for under eye filler consult. On 05/18/2021   Dr. Taylor did  Restylane Lyft  patients cheeks and recommended to   see Dr. Good for under eye filler. Patient has never had under eye   filler. Patient has noticed that her eyes look hollowed out and tired over   the past few years.       Current Outpatient Medications   Medication     cetirizine (ZYRTEC) 10 MG tablet     hydrocortisone 2.5 % ointment     pimecrolimus (ELIDEL) 1 % external cream     tretinoin (RETIN-A) 0.05 % external cream     tretinoin (RETIN-A) 0.025 % external cream     Past Medical/Surgical History:       Patient Active Problem List   Diagnosis     Vitamin D deficiency       Anxiety       Colon polyps              Assessment & Plan     Katty Mendoza is a 40 year old female with the following diagnoses:   Encounter Diagnosis   Name Primary?     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    Plan:  Bilateral tear trough Restylane-L today 1 syringe used.  She has f/u this Friday with Dr. Taylor and will ask that she obtain post photos. I am happy to see her back at any point.     Patient disposition:   No follow-ups on file.        Attending Physician Attestation: Complete documentation of historical and exam elements from today's encounter can be found in the full encounter summary report (not reduplicated in this progress note). I personally obtained the chief complaint(s) and history of present illness. I confirmed and edited as necessary the review of systems, past medical/surgical history, family history, social history, and examination findings as documented by others; and I examined the patient myself. I personally reviewed the relevant tests, images, and reports as documented above. I  formulated and edited as necessary the assessment and plan and discussed the findings and management plan with the patient.  -Murtaza Good MD    Soft Tissue Augmentation (Facial Filler) Procedure Note - Cosmetic:        Attending Staff: Murtaza Good     Diagnosis: Both lower eyelid tear trough deformity     Location: Bilateral tear trough  Product: Restylane L  Amount: 1 cc total       Description of Operation/Procedure:   The nature and purpose of the procedure, associated risks, possible consequences and complications, and alternative methods of treatment were explained in detail including but not limited to bruising, pain, redness,lumps/bumps, granuloma formation, scar blindness, stroke, ulceration, ischemia, under correction, over correction, swelling, possible need for multiple treatments, infection, granuloma, pain, dyspigmentation, numbness, weakness or tingling were explained to the patient. We discussed that multiple treatments may be required.Discussion of FDA on-label and off-label use was completed and disclosure for any sites treated off-label versus on-label was provided to patient. The patient verbalized understanding, and consent was obtained. Time-out was performed.      The tear trough injections were performed. The patient tolerated the procedure well and there were no complications.    Murtaza Good MD

## 2021-06-04 ENCOUNTER — OFFICE VISIT (OUTPATIENT)
Dept: DERMATOLOGY | Facility: CLINIC | Age: 41
End: 2021-06-04
Payer: COMMERCIAL

## 2021-06-04 ENCOUNTER — OFFICE VISIT (OUTPATIENT)
Dept: DERMATOLOGY | Facility: CLINIC | Age: 41
End: 2021-06-04

## 2021-06-04 DIAGNOSIS — L81.8 PERIORBITAL HYPERPIGMENTATION: Primary | ICD-10-CM

## 2021-06-04 DIAGNOSIS — E86.9 VOLUME DEPLETION: Primary | ICD-10-CM

## 2021-06-04 DIAGNOSIS — R23.8 FACIAL VOLUME DEPLETION: Primary | ICD-10-CM

## 2021-06-04 PROCEDURE — 99207 PR NO CHARGE NURSE ONLY: CPT | Performed by: DERMATOLOGY

## 2021-06-04 PROCEDURE — 99213 OFFICE O/P EST LOW 20 MIN: CPT | Performed by: DERMATOLOGY

## 2021-06-04 PROCEDURE — 96999 UNLISTED SPEC DERM SVC/PX: CPT | Performed by: DERMATOLOGY

## 2021-06-04 RX ORDER — AZELAIC ACID 0.15 G/G
GEL TOPICAL
Qty: 50 G | Refills: 3 | Status: SHIPPED | OUTPATIENT
Start: 2021-06-04 | End: 2021-07-29

## 2021-06-04 NOTE — PROGRESS NOTES
Soft Tissue Augmentation Procedure Note: Cosmetic    Dermatology Problem List:  1. Acne Vulgaris  - Current Rx: tretinoin 0.05% cream  2. Atopic dermatitis, seasonal allergies  - Current Rx: cetirizine 10mg every day, frequent emollient use   - Elidel: negative rxn.  3. Keratosis pilaris  4. Mildly dysplastic nevus, on the left knee biopsy 1/4/21    Procedure Date: 6/4/2021    Attending Staff: Candelaria Taylor MD    Assistant: Eulalia ABDI LPN    Diagnosis: Facial rhytides and facical volume depletion    Location: Injections completed along the jaw line on bone and on the marionette lines within the dermis  Product: Restylane Lyft  Amount: 1 ml  Lot #: [id number]  Exp Date: 2023-09-30    The area was prepped with [employer]are prior.    Description of Operation/Procedure:   The nature and purpose of the procedure, associated risks, possible consequences and complications, and alternative methods of treatment were explained in detail including but not limited to bruising, pain, redness,lumps/bumps, granuloma formation, scar, blindness, stroke, ulceration, ischemia, under correction, over correction, swelling, possible need for multiple treatments, infection, granuloma, pain, dyspigmentation, numbness, weakness or tingling were explained to the patient. We discussed that multiple treatments may be required.Discussion of FDA on-label and off-label use was completed and disclosure for any sites treated off-label versus on-label was provided to patient. The patient verbalized understanding. Photo consent and signed informed consent were obtained.Time-out was performed and patient denied history of severe allergy to bees.  Denies recent upcoming dental procedures or covid vaccine in the last 2 weeks.      The facial areas were cleansed with alcohol swabs and injections were performed.  The patient tolerated the procedure well and there were no complications. Ice was provided post-procedure. The patient was provided after care  instructions and will follow-up in 1 week.     The patient will pay cosmetic fee today.    Staff:  Scribe/Staff    Scribe Disclosure:   I, Guillermo Tejada, am serving as a scribe to document services personally performed by this physician, Dr. Candelaria Taylor, based on data collection and the provider's statements to me.     Candelaria Taylor MD    Provider Disclosure:   The documentation recorded by the scribe accurately reflects the services I personally performed and the decisions made by me.    Candelaria Taylor MD    Department of Dermatology  [facility]: Phone: [phone], Fax:[phone]  [facility]: Phone: [phone], Fax: [phone]

## 2021-06-04 NOTE — NURSING NOTE
Katty Mendoza's goals for this visit include:   Chief Complaint   Patient presents with     Filler     She requests these members of her care team be copied on today's visit information:     PCP: No Ref-Primary, Physician    Referring Provider:  No referring provider defined for this encounter.    There were no vitals taken for this visit.    Do you need any medication refills at today's visit? No    Eulalia Gray, SARATH on 6/4/2021 at 2:52 PM

## 2021-06-04 NOTE — LETTER
6/4/2021         RE: Katty Mendoza  [address]  [facility] [address]        Dear Colleague,    Thank you for referring your patient, Katty Mendoza, to the [facility]. Please see a copy of my visit note below.    [facility] Dermatology Note  Encounter Date: Jun 4, 2021  Office Visit     Dermatology Problem List:  1. Acne Vulgaris  - Current Rx: tretinoin 0.05% cream  2. Atopic dermatitis, seasonal allergies  - Current Rx: cetirizine 10mg every day, frequent emollient use , Elidel  - Elidel: negative rxn.  3. Keratosis pilaris  4. Mildly dysplastic nevus, on the left knee biopsy 1/4/21    ____________________________________________    Assessment & Plan:    # Hyperpigmentation, infraorbital   - Start Azelaic Acid daily, hold for irritation  -susncreen    # Hx of dysplastic nevus, mild, left knee  -no repigmetnation  Procedures Performed:   None    Follow-up: 9 month(s) in-person for filler on cheeks and skin check, or earlier for new or changing lesions    Staff and Scribe:     Scribe Disclosure:   I, Guillermo Tejada, am serving as a scribe to document services personally performed by this physician, Dr. Candelaria Taylor, based on data collection and the provider's statements to me.     Provider Disclosure:   The documentation recorded by the scribe accurately reflects the services I personally performed and the decisions made by me.    Candelaria Taylor MD    Department of Dermatology  [facility]: Phone: [phone], Fax:[phone]  [facility]: Phone: [phone], Fax: [phone]      ____________________________________________    CC: Derm Problem (Azelaic acid)    HPI:  Ms. Katty Mendoza is a(n) 40 year old female who presents today as a return patient for Botox.    She is also requesting a refill of Azelaic  acid.    Patient is otherwise feeling well, without additional skin concerns.    Labs Reviewed:  N/A    Physical Exam:  Vitals: There were no vitals taken for this visit.  SKIN: Focused examination of the face was performed.  - Hyperpigmentation noted infraorbital  No re pigmentation on knww  - No other lesions of concern on areas examined.     Medications:  Current Outpatient Medications   Medication     azelaic acid (FINACIA) 15 % external gel     cetirizine (ZYRTEC) 10 MG tablet     hydrocortisone 2.5 % ointment     pimecrolimus (ELIDEL) 1 % external cream     tretinoin (RETIN-A) 0.025 % external cream     tretinoin (RETIN-A) 0.05 % external cream     No current facility-administered medications for this visit.       Past Medical History:   Patient Active Problem List   Diagnosis     Vitamin D deficiency     Past Medical History:   Diagnosis Date     Anxiety      Colon polyps         CC No referring provider defined for this encounter. on close of this encounter.      Again, thank you for allowing me to participate in the care of your patient.        Sincerely,        Candelaria Taylor MD

## 2021-06-04 NOTE — PATIENT INSTRUCTIONS
Filler Information:    I will have pain, bruising, redness, and swelling after the procedure and lasting for approximately 1-3 weeks. Risks are lumps/bumps, skin discoloration, bleeding, numbness, infection, granuloma formation, scar, ulceration, under correction, over correction and rarely, risks of stroke and blindness. Multiple treatments may be required.        Dermal fillers are injected into the skin to soften crease or folds, support areas of volume loss or contour specific facial areas.     You may experience a mild to moderate amount of stinging or aching sensation post injection.    To ensure an even correction, the physician will massage the area treated, which may cause a temporary amount of redness to your skin.    Bruising at the site of injection is common and may last two weeks    Temporary minimal to moderate swelling can be expected, which should gradually improve following injection    It is normal to experience some tenderness at the treatment site for a few days    After treatment care instructions:    Apply an ice pack or cold compress to the injection area after treatment to help reduce swelling.    Keep your head elevated(even while sleeping) as much as possible and avoid sleeping on your side or stomach    No alcohol consumption or exercise for the first 24 hours after treatment.    Do not touch the treated area for 6 hours    You may use make-up the following day    You may return to normal/routine activities but you should check with your physician for their recommendation     Avoid excessive scrubbing or rubbing of the injection area    If you have previously suffered from cold sores, there is a risk that the needle punctures around the mouth/lips could contribute to another recurrence    Immediately report to your physician if you have any of the following:    Delayed swelling happening 7-14 days after treatment    Numbness lasting 3-4 days    Muscle weakness in the area of  injection    Severe pain    Dusky discoloration of one half of the face    Changes in vision or eye pain    Cold sore    Who should I call with questions?    [facility]: [phone]     [facility]: [phone]    For urgent needs outside of business hours call the [facility] at [phone] and ask for the resident on call

## 2021-06-04 NOTE — NURSING NOTE
Katty Mendoza comes into clinic today at the request of Dr. Taylor Ordering Provider for LMX prior to filler on lower face/ chin.     This service provided today was under the supervising provider of the day Dr. Taylor, who was available if needed.    The following medication was given:     MEDICATION: lidocaine cream 4%  ROUTE: topical  SITE: lower face and chin area  DOSE: 0.25g  LOT #: [id number]  :  [employer]   EXPIRATION DATE:  12.2022      Eulalia Gray, CMA

## 2021-06-04 NOTE — PROGRESS NOTES
[facility] Dermatology Note  Encounter Date: Jun 4, 2021  Office Visit     Dermatology Problem List:  1. Acne Vulgaris  - Current Rx: tretinoin 0.05% cream  2. Atopic dermatitis, seasonal allergies  - Current Rx: cetirizine 10mg every day, frequent emollient use , Elidel  - Elidel: negative rxn.  3. Keratosis pilaris  4. Mildly dysplastic nevus, on the left knee biopsy 1/4/21    ____________________________________________    Assessment & Plan:    # Hyperpigmentation, infraorbital   - Start Azelaic Acid daily, hold for irritation  -susncreen    # Hx of dysplastic nevus, mild, left knee  -no repigmetnation  Procedures Performed:   None    Follow-up: 9 month(s) in-person for filler on cheeks and skin check, or earlier for new or changing lesions    Staff and Scribe:     Scribe Disclosure:   I, Guillermo Tejada, am serving as a scribe to document services personally performed by this physician, Dr. Candelaria Taylor, based on data collection and the provider's statements to me.     Provider Disclosure:   The documentation recorded by the scribe accurately reflects the services I personally performed and the decisions made by me.    Candelaria Taylor MD    Department of Dermatology  [facility]: Phone: [phone], Fax:[phone]  [facility]: Phone: [phone], Fax: [phone]      ____________________________________________    CC: Derm Problem (Azelaic acid)    HPI:  Ms. Katty Mendoza is a(n) 40 year old female who presents today as a return patient for Botox.    She is also requesting a refill of Azelaic acid.    Patient is otherwise feeling well, without additional skin concerns.    Labs Reviewed:  N/A    Physical Exam:  Vitals: There were no vitals taken for this visit.  SKIN: Focused examination of the face was performed.  - Hyperpigmentation noted infraorbital  No re  pigmentation on knww  - No other lesions of concern on areas examined.     Medications:  Current Outpatient Medications   Medication     azelaic acid (FINACIA) 15 % external gel     cetirizine (ZYRTEC) 10 MG tablet     hydrocortisone 2.5 % ointment     pimecrolimus (ELIDEL) 1 % external cream     tretinoin (RETIN-A) 0.025 % external cream     tretinoin (RETIN-A) 0.05 % external cream     No current facility-administered medications for this visit.       Past Medical History:   Patient Active Problem List   Diagnosis     Vitamin D deficiency     Past Medical History:   Diagnosis Date     Anxiety      Colon polyps         CC No referring provider defined for this encounter. on close of this encounter.      ADDENDUM 6/8/2021    No photos see Saturday.

## 2021-06-04 NOTE — LETTER
6/4/2021         RE: Katty Mendoza  [address]  [facility] [address]        Dear Colleague,    Thank you for referring your patient, Katty Mendoza, to the [facility]. Please see a copy of my visit note below.         Soft Tissue Augmentation Procedure Note: Cosmetic    Dermatology Problem List:  1. Acne Vulgaris  - Current Rx: tretinoin 0.05% cream  2. Atopic dermatitis, seasonal allergies  - Current Rx: cetirizine 10mg every day, frequent emollient use   - Elidel: negative rxn.  3. Keratosis pilaris  4. Mildly dysplastic nevus, on the left knee biopsy 1/4/21    Procedure Date: 6/4/2021    Attending Staff: Candelaria Taylor MD    Assistant: Eulalia ABDI LPN    Diagnosis: Facial rhytides and facical volume depletion    Location: Injections completed along the jaw line on bone and on the marionette lines within the dermis  Product: Restylane Lyft  Amount: 1 ml  Lot #: [id number]  Exp Date: 2023-09-30    The area was prepped with [employer]ic[employer] prior.    Description of Operation/Procedure:   The nature and purpose of the procedure, associated risks, possible consequences and complications, and alternative methods of treatment were explained in detail including but not limited to bruising, pain, redness,lumps/bumps, granuloma formation, scar, blindness, stroke, ulceration, ischemia, under correction, over correction, swelling, possible need for multiple treatments, infection, granuloma, pain, dyspigmentation, numbness, weakness or tingling were explained to the patient. We discussed that multiple treatments may be required.Discussion of FDA on-label and off-label use was completed and disclosure for any sites treated off-label versus on-label was provided to patient. The patient verbalized understanding. Photo consent and signed informed consent were obtained.Time-out was performed and patient denied history of severe allergy to bees.  Denies recent upcoming dental procedures or covid  vaccine in the last 2 weeks.      The facial areas were cleansed with alcohol swabs and injections were performed.  The patient tolerated the procedure well and there were no complications. Ice was provided post-procedure. The patient was provided after care instructions and will follow-up in 1 week.     The patient will pay cosmetic fee today.    Staff:  Scribe/Staff    Scribe Disclosure:   I, Guillermo Tejada, am serving as a scribe to document services personally performed by this physician, Dr. Candelaria Taylor, based on data collection and the provider's statements to me.     Candelaria Taylor MD    Provider Disclosure:   The documentation recorded by the scribe accurately reflects the services I personally performed and the decisions made by me.    Candelaria Taylor MD    Department of Dermatology  [facility]: Phone: [phone], Fax:[phone]  [facility]: Phone: [phone], Fax: [phone]                Again, thank you for allowing me to participate in the care of your patient.        Sincerely,        Candelaria Taylor MD

## 2021-06-10 NOTE — PROGRESS NOTES
Her filler was perfect  No complications at all from eye filler    I couldn't feel nodules, more mild swelling

## 2021-06-30 ENCOUNTER — OFFICE VISIT (OUTPATIENT)
Dept: OPHTHALMOLOGY | Facility: CLINIC | Age: 41
End: 2021-06-30
Payer: COMMERCIAL

## 2021-06-30 DIAGNOSIS — H02.832 DERMATOCHALASIS OF UPPER AND LOWER EYELIDS OF BOTH EYES: Primary | ICD-10-CM

## 2021-06-30 DIAGNOSIS — H02.834 DERMATOCHALASIS OF UPPER AND LOWER EYELIDS OF BOTH EYES: Primary | ICD-10-CM

## 2021-06-30 DIAGNOSIS — H02.835 DERMATOCHALASIS OF UPPER AND LOWER EYELIDS OF BOTH EYES: Primary | ICD-10-CM

## 2021-06-30 DIAGNOSIS — H02.831 DERMATOCHALASIS OF UPPER AND LOWER EYELIDS OF BOTH EYES: Primary | ICD-10-CM

## 2021-06-30 DIAGNOSIS — L81.8 PERIORBITAL HYPERPIGMENTATION: ICD-10-CM

## 2021-06-30 PROCEDURE — 99212 OFFICE O/P EST SF 10 MIN: CPT | Performed by: OPHTHALMOLOGY

## 2021-06-30 ASSESSMENT — SLIT LAMP EXAM - LIDS: COMMENTS: NORMAL

## 2021-07-06 NOTE — NURSING NOTE
Dermatology Rooming Note    Katty BOOKER Durgacarmelina's goals for this visit include:   Chief Complaint   Patient presents with     Derm Problem     Katty is here for a skin check and to discuss acne.  States no concerning areas for her skin check.        Sara Strong LPN  
76y/o M with PMH HTN, DM, CKD III p/w 1 month of left hand weakness for 1 month. Pt presented to the ED and was seen by neurology.  Pt states that his left hand numbness/weakness associated with intermittent "twitching" x 4 weeks. Pt is on hydralazine 100mg TID , amlodipine 10mg , atenelol 100mg daily, Benzaperil 40mg. He denies fever, chills, chest pain, dizziness. He had negative CT scans which showed -No acute transcortical infarct or intracranial hemorrhage.-Prominent lateral ventricles with acute callosal angle. Cannot exclude normal pressure hydrocephalus.  denies fever, chills, chest pain, SOB, abdominal pain, diarrhea, dysuria, syncope, bleeding, new rash,weakness, +numbness, blurred vision    ROS  otherwise negative as per HPI  Gen: Awake, Alert, WD, WN, NAD  Head:  NC/AT  Eyes:  PERRL, EOMI, Conjunctiva pink, lids normal, no scleral icterus  ENT: OP clear, no exudates, no erythema, uvula midline, moist mucus membranes  Neck: supple, nontender, no meningismus, no JVD, trachea midline  Cardiac/CV:  S1 S2, RRR, no M/G/R  Respiratory/Pulm:  CTAB, good air movement, normal resp effort, no wheezes/stridor/retractions/rales/rhonchi  Gastrointestinal/Abdomen:  Soft, nontender, nondistended, +BS, no rebound/guarding  Back:  no CVAT, no MLT  Ext:  warm, well perfused, moving all extremities spontaneously, no peripheral edema, distal pulses intact  Skin: intact, no rash  Neuro:  AAOx3, sensation intact, motor 5/5 x 4 extremities, normal gait, speech clear left hand  strength   CDU:  pt pending MRI   Blood pressure elevated pt on several antihypertensive medications, recommended follow up with his PMD for blood pressure titration  CKD: pt w/ CKD elevated creatinine in 2020 and current 2.1 pt given nephrology follow up   Left hand numbness, pt w/ negative ct scan pending MRI will obtain neuro recommendations

## 2021-07-16 ENCOUNTER — OFFICE VISIT (OUTPATIENT)
Dept: OBGYN | Facility: CLINIC | Age: 41
End: 2021-07-16
Attending: OBSTETRICS & GYNECOLOGY
Payer: COMMERCIAL

## 2021-07-16 VITALS
HEIGHT: 70 IN | DIASTOLIC BLOOD PRESSURE: 82 MMHG | HEART RATE: 61 BPM | BODY MASS INDEX: 29.76 KG/M2 | SYSTOLIC BLOOD PRESSURE: 122 MMHG | WEIGHT: 207.9 LBS

## 2021-07-16 DIAGNOSIS — Z12.4 SCREENING FOR MALIGNANT NEOPLASM OF CERVIX: ICD-10-CM

## 2021-07-16 DIAGNOSIS — Z01.419 ENCOUNTER FOR GYNECOLOGICAL EXAMINATION WITHOUT ABNORMAL FINDING: ICD-10-CM

## 2021-07-16 PROCEDURE — G0145 SCR C/V CYTO,THINLAYER,RESCR: HCPCS | Performed by: OBSTETRICS & GYNECOLOGY

## 2021-07-16 PROCEDURE — 87624 HPV HI-RISK TYP POOLED RSLT: CPT | Performed by: OBSTETRICS & GYNECOLOGY

## 2021-07-16 PROCEDURE — G0101 CA SCREEN;PELVIC/BREAST EXAM: HCPCS | Performed by: OBSTETRICS & GYNECOLOGY

## 2021-07-16 PROCEDURE — G0463 HOSPITAL OUTPT CLINIC VISIT: HCPCS | Mod: 25

## 2021-07-16 ASSESSMENT — MIFFLIN-ST. JEOR: SCORE: 1698.28

## 2021-07-16 NOTE — LETTER
2021       RE: Katty Mendoza  [address]  [place] [address]     Dear Colleague,    Thank you for referring your patient, Katty Mendoza, to the [facility] [place] [facility]. Please see a copy of my visit note below.    [facility]  Gynecology Visit    SUBJECTIVE    Katty Mendoza is a 40 year old  who is here for her annual visit and Pap smear.    Her LMP is: No LMP recorded.    PAST MEDICAL HISTORY  Past Medical History:   Diagnosis Date     Anxiety      Colon polyps        MEDICATIONS  Current Outpatient Medications   Medication     azelaic acid (FINACIA) 15 % external gel     cetirizine (ZYRTEC) 10 MG tablet     hydrocortisone 2.5 % ointment     pimecrolimus (ELIDEL) 1 % external cream     tretinoin (RETIN-A) 0.025 % external cream     tretinoin (RETIN-A) 0.05 % external cream     No current facility-administered medications for this visit.       ALLERGIES  No Known Allergies    OBSTETRIC/GYNECOLOGIC HISTORY  Period cycle/length/flow/associated symptoms: q 25 days, lasts 3 days  Current contraception: none, currently not sexually active  History of STDs: no  Lab Results   Component Value Date    PAP NIL 2020      History of abnormal Pap smear: Pap from 06/15/2016 showed HR HPV; All Pap smears since have been normal    OB History    Para Term  AB Living   0 0 0 0 0 0   SAB TAB Ectopic Multiple Live Births   0 0 0 0 0       PAST SURGICAL HISTORY   Past Surgical History:   Procedure Laterality Date     APPENDECTOMY  2002    Dr. Villalta     BREAST BIOPSY, RT/LT Right 2008    times 2; fibroadenoma     BREAST SURGERY  ,     COLONOSCOPY N/A 2019    Procedure: COMBINED COLONOSCOPY, SINGLE OR MULTIPLE BIOPSY/POLYPECTOMY BY BIOPSY;  Surgeon: Juan Miguel Guan MD;  [facility]     COLONOSCOPY N/A 2020    Procedure: COLONOSCOPY, WITH POLYPECTOMY AND BIOPSY;   Surgeon: Juan Miguel Guan MD;  [facility]     COLONOSCOPY N/A 1/25/2021    Procedure: COLONOSCOPY, WITH POLYPECTOMY AND BIOPSY;  Surgeon: Juan Miguel Guan MD;  Location: [facility]     left adnexal cystectomy Left 05/07/2002    Dr. Villalta     SALPINGO OOPHORECTOMY,R/L/BOBBY Right 2002    complete Dr. Villalta       SOCIAL HISTORY    Social History     Tobacco Use     Smoking status: Never Smoker     Smokeless tobacco: Never Used   Substance Use Topics     Alcohol use: Yes     Alcohol/week: 0.8 - 1.7 standard drinks     Comment: occas      Drug use: No     Social History     Social History Narrative    How much exercise per week? Runs 3-4 times per week    How much calcium per day? Some, milk and yogurt       How much caffeine per day? 1-2 cups per day    How much vitamin D per day? None    Do you/your family wear seatbelts?  Yes    Do you/your family use safety helmets? Yes    Do you/your family use sunscreen? Yes    Do you/your family keep firearms in the home? No    Do you/your family have a smoke detector(s)? Yes        Do you feel safe in your home? Yes    Has anyone ever touched you in an unwanted manner? No     Explain          Maida [birth year], 2015 Estela Rodriguez BHARATH                    5/14/14        Lives in [facility] near the [employer]. Enjoys running and road biking.     Dating, heterosexual.     Megan Uriostegui MD       New job with start up this week!  Works remotely from home, concerned about decreased activity level.    FAMILY HISTORY    Family History   Problem Relation Age of Onset     Cancer Paternal Grandmother         melanoma     Melanoma Paternal Grandmother      Lipids Father      Osteoporosis Maternal Grandmother      Cerebrovascular Disease Maternal Grandfather      Asthma No family hx of      C.A.D. No family hx of      Colon Cancer No family hx of      Inflammatory Bowel Disease No family hx of        REVIEW OF SYSTEMS  A 10 point review of systems including Constitutional, Eyes, Respiratory,  "Cardiovascular, Gastroenterology, Genitourinary, Integumentary, Musculoskeletal, and Psychiatric, were all negative, except for pertinent positives noted in the above HPI.    OBJECTIVE  /82   Pulse 61   Ht 1.778 m (5' 10\")   Wt 94.3 kg (207 lb 14.4 oz)   BMI 29.83 kg/m      General: Alert, without distress  HEENT: normocephalic, without obvious abnormality; normal thyroid gland   Breast: Within normal limits bilaterally, no nipple discharge, no lymphadenopathy  Cardiovascular: regular rate and rhythm, no murmurs/rubs/gallops   Lungs: clear to auscultation bilaterally   Abdomen: soft, non-tender, non-distended, normal bowel sounds   Pelvic: normal external female genitalia; normal vagina with physiologic discharge; normal cervix without lesions/masses; uterus 6 week size, retroverted, mobile, nontender; adnexae nontender and without masses; normal anus/perineum   Extremities: normal      ASSESSMENT  Katty Mendoza is a 40 year old  who is here for annual breast and pelvic exam  Primary care through [facility].    PLAN    Age 19-39 Annual Breast/Pelvic Exam  1.  Screening   Ng/Ct testing declined  Cervical cancer- given history of HPV, patient prefers annually, sent today   CBE, mammograms to start next year.    2.  Immunizations   HPV,completed   Influenza yearly           RTC in one year for an annual examination.    Patient staffed and seen with Dr. Minesh Vega, MS3    I, Catrina Edge, was present with the medical student who participated in the service and in the documentation of the note. I have verified the history and personally performed the physical exam and medical decision making. I agree with the assessment and plan of care as documented in the note.     Catrina Edge MD      "

## 2021-07-16 NOTE — PROGRESS NOTES
[facility]  Gynecology Visit    SUBJECTIVE    Katty Mendoza is a 40 year old  who is here for her annual visit and Pap smear.    Her LMP is: No LMP recorded.    PAST MEDICAL HISTORY  Past Medical History:   Diagnosis Date     Anxiety      Colon polyps        MEDICATIONS  Current Outpatient Medications   Medication     azelaic acid (FINACIA) 15 % external gel     cetirizine (ZYRTEC) 10 MG tablet     hydrocortisone 2.5 % ointment     pimecrolimus (ELIDEL) 1 % external cream     tretinoin (RETIN-A) 0.025 % external cream     tretinoin (RETIN-A) 0.05 % external cream     No current facility-administered medications for this visit.       ALLERGIES  No Known Allergies    OBSTETRIC/GYNECOLOGIC HISTORY  Period cycle/length/flow/associated symptoms: q 25 days, lasts 3 days  Current contraception: none, currently not sexually active  History of STDs: no  Lab Results   Component Value Date    PAP NIL 2020      History of abnormal Pap smear: Pap from 06/15/2016 showed HR HPV; All Pap smears since have been normal    OB History    Para Term  AB Living   0 0 0 0 0 0   SAB TAB Ectopic Multiple Live Births   0 0 0 0 0       PAST SURGICAL HISTORY   Past Surgical History:   Procedure Laterality Date     APPENDECTOMY  2002    Dr. Villalta     BREAST BIOPSY, RT/LT Right     times 2; fibroadenoma     BREAST SURGERY  ,     COLONOSCOPY N/A 2019    Procedure: COMBINED COLONOSCOPY, SINGLE OR MULTIPLE BIOPSY/POLYPECTOMY BY BIOPSY;  Surgeon: Juan Miguel Guan MD;  [facility]     COLONOSCOPY N/A 2020    Procedure: COLONOSCOPY, WITH POLYPECTOMY AND BIOPSY;  Surgeon: Juan Miguel Guan MD;  [facility]     COLONOSCOPY N/A 2021    Procedure: COLONOSCOPY, WITH POLYPECTOMY AND BIOPSY;  Surgeon: Juan Miguel Guan MD;  Location: [facility]     left adnexal cystectomy Left 2002    Dr. Villalta     SALPINGO OOPHORECTOMY,R/L/BOBBY Right     complete Dr. Villalta       SOCIAL  "HISTORY    Social History     Tobacco Use     Smoking status: Never Smoker     Smokeless tobacco: Never Used   Substance Use Topics     Alcohol use: Yes     Alcohol/week: 0.8 - 1.7 standard drinks     Comment: occas      Drug use: No     Social History     Social History Narrative    How much exercise per week? Runs 3-4 times per week    How much calcium per day? Some, milk and yogurt       How much caffeine per day? 1-2 cups per day    How much vitamin D per day? None    Do you/your family wear seatbelts?  Yes    Do you/your family use safety helmets? Yes    Do you/your family use sunscreen? Yes    Do you/your family keep firearms in the home? No    Do you/your family have a smoke detector(s)? Yes        Do you feel safe in your home? Yes    Has anyone ever touched you in an unwanted manner? No     Explain          Maida 10, 2015 Estela Rodriguez LPN                    5/14/14        Lives in [place] near the [employer]. Enjoys running and road biking.     Dating, heterosexual.     Megan Uriostegui MD       New job with start up this week!  Works remotely from home, concerned about decreased activity level.    FAMILY HISTORY    Family History   Problem Relation Age of Onset     Cancer Paternal Grandmother         melanoma     Melanoma Paternal Grandmother      Lipids Father      Osteoporosis Maternal Grandmother      Cerebrovascular Disease Maternal Grandfather      Asthma No family hx of      C.A.D. No family hx of      Colon Cancer No family hx of      Inflammatory Bowel Disease No family hx of        REVIEW OF SYSTEMS  A 10 point review of systems including Constitutional, Eyes, Respiratory, Cardiovascular, Gastroenterology, Genitourinary, Integumentary, Musculoskeletal, and Psychiatric, were all negative, except for pertinent positives noted in the above HPI.    OBJECTIVE  /82   Pulse 61   Ht 1.778 m (5' 10\")   Wt 94.3 kg (207 lb 14.4 oz)   BMI 29.83 kg/m      General: Alert, without " distress  HEENT: normocephalic, without obvious abnormality; normal thyroid gland   Breast: Within normal limits bilaterally, no nipple discharge, no lymphadenopathy  Cardiovascular: regular rate and rhythm, no murmurs/rubs/gallops   Lungs: clear to auscultation bilaterally   Abdomen: soft, non-tender, non-distended, normal bowel sounds   Pelvic: normal external female genitalia; normal vagina with physiologic discharge; normal cervix without lesions/masses; uterus 6 week size, retroverted, mobile, nontender; adnexae nontender and without masses; normal anus/perineum   Extremities: normal      ASSESSMENT  Katty Mendoza is a 40 year old  who is here for annual breast and pelvic exam  Primary care through [facility].    PLAN    Age 19-39 Annual Breast/Pelvic Exam  1.  Screening   Ng/Ct testing declined  Cervical cancer- given history of HPV, patient prefers annually, sent today   CBE, mammograms to start next year.    2.  Immunizations   HPV,completed   Influenza yearly           RTC in one year for an annual examination.    Patient staffed and seen with Dr. Minesh Vega, MS3    I, Catrina Edge, was present with the medical student who participated in the service and in the documentation of the note. I have verified the history and personally performed the physical exam and medical decision making. I agree with the assessment and plan of care as documented in the note.     Catrina Edge MD

## 2021-07-21 LAB
BKR LAB AP GYN ADEQUACY: NORMAL
BKR LAB AP GYN INTERPRETATION: NORMAL
BKR LAB AP HPV REFLEX: NORMAL
BKR LAB AP PREVIOUS ABNL DX: NORMAL
BKR LAB AP PREVIOUS ABNORMAL: NORMAL
PATH REPORT.COMMENTS IMP SPEC: NORMAL
PATH REPORT.RELEVANT HX SPEC: NORMAL

## 2021-07-23 LAB
HUMAN PAPILLOMA VIRUS 16 DNA: NEGATIVE
HUMAN PAPILLOMA VIRUS 18 DNA: NEGATIVE
HUMAN PAPILLOMA VIRUS FINAL DIAGNOSIS: NORMAL
HUMAN PAPILLOMA VIRUS OTHER HR: NEGATIVE

## 2021-07-29 ENCOUNTER — OFFICE VISIT (OUTPATIENT)
Dept: FAMILY MEDICINE | Facility: CLINIC | Age: 41
End: 2021-07-29
Payer: COMMERCIAL

## 2021-07-29 VITALS
TEMPERATURE: 97.3 F | DIASTOLIC BLOOD PRESSURE: 67 MMHG | SYSTOLIC BLOOD PRESSURE: 101 MMHG | WEIGHT: 206 LBS | BODY MASS INDEX: 29.49 KG/M2 | HEIGHT: 70 IN | HEART RATE: 58 BPM | OXYGEN SATURATION: 98 %

## 2021-07-29 DIAGNOSIS — F41.9 ANXIETY: ICD-10-CM

## 2021-07-29 DIAGNOSIS — F51.02 ADJUSTMENT INSOMNIA: Primary | ICD-10-CM

## 2021-07-29 PROCEDURE — 99213 OFFICE O/P EST LOW 20 MIN: CPT | Performed by: PHYSICIAN ASSISTANT

## 2021-07-29 RX ORDER — PROPRANOLOL HYDROCHLORIDE 10 MG/1
10 TABLET ORAL
Qty: 45 TABLET | Refills: 0 | Status: SHIPPED | OUTPATIENT
Start: 2021-07-29 | End: 2022-03-21

## 2021-07-29 ASSESSMENT — MIFFLIN-ST. JEOR: SCORE: 1689.66

## 2021-07-29 NOTE — PROGRESS NOTES
"Assessment and Plan:     (F51.02) Adjustment insomnia  (primary encounter diagnosis)  Comment: suspect most sxs secondary to new job, she is limited in what she will take, very sensitive to medications and does not like to feel groggy in the am  Plan: propranolol (INDERAL) 10 MG tablet at bedtime prn      Trial of inderal, she'll follow-up with me in one month to see how she is doing    (F41.9) Anxiety  Comment: reports that she has had issues w/anxiety since her 20s, does not like medications and would like to avoid if possible  Plan: check back w/me in one month, if still having a lot of anxiety would discuss mental health referral    Rossana Jules PA-C        Tha Alaniz is a 40 year old who presents for the following health issues     HPI     Has trouble getting solid 8 hours of sleep at night  Usually wakes up at about 4am and cannot fall back asleep  Recently started new job (July 12)  and now awake even earlier 1:30am or so  Has tried taking medications for sleep but doesn't like feeling like a zombie after taking something  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 and feels a lot of pressure to perform  She gets up around 5:30 usually  She goes to bed about 10pm, last few weeks 9:30-10pm  She does one cup of coffee in the am and nothing after that  She walks daily--2.5-3 miles    She denies that she feels depressed but does have a history of anxiety which she feels she is under control though admits to very stressful current situation     Very sensitive to medications--has tried ssri but did not like how it made her feel  Does not like how advil pm, melatonin make her feel--too groggy  Concerned that ambien would make her too groggy    Review of Systems   See above      Objective      /67 (BP Location: Left arm, Patient Position: Chair, Cuff Size: Adult Large)   Pulse 58   Temp 97.3  F (36.3  C) (Temporal)   Ht 1.778 m (5' 10\")   Wt 93.4 kg (206 lb)   SpO2 98%   " BMI 29.56 kg/m        Physical Exam     GENERAL: healthy, alert and no distress  RESP: lungs clear to auscultation - no rales, no rhonchi, no wheezes  CV: regular rates and rhythm, normal S1 S2, no S3 or S4 and no murmur, no click or rub

## 2021-08-04 ENCOUNTER — TELEPHONE (OUTPATIENT)
Dept: FAMILY MEDICINE | Facility: CLINIC | Age: 41
End: 2021-08-04

## 2021-08-04 DIAGNOSIS — F41.9 ANXIETY: ICD-10-CM

## 2021-08-04 DIAGNOSIS — F51.02 ADJUSTMENT INSOMNIA: Primary | ICD-10-CM

## 2021-08-04 NOTE — TELEPHONE ENCOUNTER
Called patient 8/4/21 to follow-up on insomnia/anxiety.  She has used inderal 3 times at bedtime and feels it is helping her relax and fall asleep quicker, rodger when she works late.  She denies any significant side effects.  We discussed that it can lower BP and HR though likely not significantly at such a low dose (10mg).  She suspects most of her current symptoms w/sleep due to situational anxiety w/new job but will make appointment in a few weeks if still having difficulty w/anxiety/sleep.

## 2021-09-18 ENCOUNTER — HEALTH MAINTENANCE LETTER (OUTPATIENT)
Age: 41
End: 2021-09-18

## 2021-10-25 NOTE — TELEPHONE ENCOUNTER
I called Katty and SAMMIE. Due to the COVID-19  virus we are reducing the traffic at the [facility] and asking patients who do not need to be seen in a urgent matter if they can reschedule for 4-6 weeks. Clinic number provided.   Odilia Singh, LANRE           
65M with CAD s/p Stent in 2013 who presents with abdominal pain. Pain started yesterday evening in the epigastric region with radiation to right upper quadrant. Pain with no associated symptoms. no nausea or vomiting. No relation to oral intake. Normal bowel movement. Patient reports he feels hungry at this time. Denies fevers, chest pain and shortness of breath.

## 2021-11-08 ENCOUNTER — TELEPHONE (OUTPATIENT)
Dept: DERMATOLOGY | Facility: CLINIC | Age: 41
End: 2021-11-08
Payer: COMMERCIAL

## 2021-12-01 ENCOUNTER — PREP FOR PROCEDURE (OUTPATIENT)
Dept: GASTROENTEROLOGY | Facility: CLINIC | Age: 41
End: 2021-12-01
Payer: COMMERCIAL

## 2021-12-01 DIAGNOSIS — D12.1 ADENOMA OF APPENDIX: Primary | ICD-10-CM

## 2021-12-06 ENCOUNTER — PATIENT OUTREACH (OUTPATIENT)
Dept: GASTROENTEROLOGY | Facility: CLINIC | Age: 41
End: 2021-12-06
Payer: COMMERCIAL

## 2021-12-06 NOTE — PROGRESS NOTES
"Called to discuss with patient.  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 If outside [facility] will need physical faxed to number [phone]   If you do not get a preop physical, your procedure could be cancelled, patient voiced understanding*    Preop Plan: PAC appt to be made once date of procedure is determined    Med Review    Blood thinner -  none   ASA - none  Diabetic - none    COVID test discussed: pt tested positive for COVID on 12/1    Patient Education r/t procedure: mychart    A pre-op nurse will call 1-2 days prior to the procedure.     NPO/Prep: miralax prep    Pt is fully vaccinated. Did have a positive COVID test on 12/1. Did get exposed by her nephew and had some \"sniffles\" as symptoms  Pt would like to schedule in Dec if possible, otherwise will need to delay until March d/t travel plans    Verbalized understanding of all instructions. All questions answered.     Message sent to OR     Lissy Mike, RN, BSN,   [facility]  Care coordinator                       "

## 2021-12-08 ENCOUNTER — TELEPHONE (OUTPATIENT)
Dept: DERMATOLOGY | Facility: CLINIC | Age: 41
End: 2021-12-08
Payer: COMMERCIAL

## 2021-12-08 NOTE — TELEPHONE ENCOUNTER
12/8 Provided phone number [phone] to schedule follow up with Dr. Taylor.     Teresa stuart Procedure   Orthopedics, Podiatry, Sports Medicine, ENT/Eye Specialties  [facility]   [phone]

## 2022-01-05 ENCOUNTER — TELEPHONE (OUTPATIENT)
Dept: DERMATOLOGY | Facility: CLINIC | Age: 42
End: 2022-01-05
Payer: COMMERCIAL

## 2022-01-05 NOTE — TELEPHONE ENCOUNTER
[facility] Call Center    Phone Message    May a detailed message be left on voicemail: yes - MyC is acceptable as well    Reason for Call: Appointment Intake    Referring Provider Name:   Dr Taylor    Diagnosis and/or Symptoms:   9 month(s) in-person for filler on cheeks     Action Taken: Message routed to:  Adult Clinics: Dermatology p [id number]    Travel Screening: Not Applicable    Pt needs to schedule Follow-up [facility] D[facility] appt. 9 month(s) in-person for filler on cheeks .    Please call Pt to schedule. [place] 1[place]15[place]22.    Please call before departure or use MyC to schedule/ Thanks!

## 2022-01-06 NOTE — TELEPHONE ENCOUNTER
FUTURE VISIT INFORMATION      SURGERY INFORMATION:    Date: 3/24/22    Location: [facility]    Surgeon:  Juan Miguel Guan MD    Anesthesia Type:  MAAC    Procedure: COLONOSCOPY    RECORDS REQUESTED FROM:       Primary Care Provider: [facility]

## 2022-02-17 DIAGNOSIS — Z11.59 ENCOUNTER FOR SCREENING FOR OTHER VIRAL DISEASES: Primary | ICD-10-CM

## 2022-03-21 ENCOUNTER — ANESTHESIA EVENT (OUTPATIENT)
Dept: GASTROENTEROLOGY | Facility: CLINIC | Age: 42
End: 2022-03-21
Payer: COMMERCIAL

## 2022-03-21 ENCOUNTER — OFFICE VISIT (OUTPATIENT)
Dept: SURGERY | Facility: CLINIC | Age: 42
End: 2022-03-21
Payer: COMMERCIAL

## 2022-03-21 ENCOUNTER — PRE VISIT (OUTPATIENT)
Dept: SURGERY | Facility: CLINIC | Age: 42
End: 2022-03-21

## 2022-03-21 VITALS
TEMPERATURE: 97.9 F | SYSTOLIC BLOOD PRESSURE: 133 MMHG | WEIGHT: 213 LBS | RESPIRATION RATE: 16 BRPM | HEIGHT: 70 IN | BODY MASS INDEX: 30.49 KG/M2 | OXYGEN SATURATION: 98 % | DIASTOLIC BLOOD PRESSURE: 88 MMHG | HEART RATE: 70 BPM

## 2022-03-21 DIAGNOSIS — D12.1 ADENOMA OF APPENDIX: ICD-10-CM

## 2022-03-21 DIAGNOSIS — Z01.818 PRE-OP EXAMINATION: Primary | ICD-10-CM

## 2022-03-21 DIAGNOSIS — Z11.59 ENCOUNTER FOR SCREENING FOR OTHER VIRAL DISEASES: ICD-10-CM

## 2022-03-21 PROCEDURE — U0005 INFEC AGEN DETEC AMPLI PROBE: HCPCS | Performed by: INTERNAL MEDICINE

## 2022-03-21 PROCEDURE — 99203 OFFICE O/P NEW LOW 30 MIN: CPT | Performed by: PHYSICIAN ASSISTANT

## 2022-03-21 RX ORDER — CHOLECALCIFEROL (VITAMIN D3) 1250 MCG
CAPSULE ORAL
COMMUNITY
Start: 2021-07-07

## 2022-03-21 ASSESSMENT — PAIN SCALES - GENERAL: PAINLEVEL: NO PAIN (0)

## 2022-03-21 NOTE — H&P (VIEW-ONLY)
Pre-Operative H & P     CC:  Preoperative exam to assess for increased cardiopulmonary risk while undergoing surgery and anesthesia.    Date of Encounter: 3/21/2022  Primary Care Physician:  No Ref-Primary, Physician     Reason for visit:   Encounter Diagnoses   Name Primary?     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  The patient has been followed by gastroenterology after her first colonoscopy in 2019 was significant for multiple colon polyps.  One was 30 mm in size and was tubular adenoma with high-grade dysplasia which she had to return in 3 months for complete resection.  She has continued to have follow-up colonoscopies with her last one in January 25, 2021.  She now presents for follow-up colonoscopy.    History is obtained from the patient and chart review    Hx of abnormal bleeding or anti-platelet use: none    Menstrual history: Patient's last menstrual period was 03/01/2022 (approximate).:      Past Medical History  Past Medical History:   Diagnosis Date     Acne      Atopic dermatitis      Colon polyps      History of COVID-19      History of ovarian cyst      Seasonal allergic rhinitis        Past Surgical History  Past Surgical History:   Procedure Laterality Date     APPENDECTOMY  05/08/2002     Dr. Villalta     BREAST BIOPSY, RT/LT Right 2008    times 2; fibroadenoma     BREAST SURGERY  2007,2013     COLONOSCOPY N/A 4/22/2019    Procedure: COMBINED COLONOSCOPY, SINGLE OR MULTIPLE BIOPSY/POLYPECTOMY BY BIOPSY;  Surgeon: Juan Miguel Guan MD;  Location: [facility]     COLONOSCOPY N/A 2/24/2020    Procedure: COLONOSCOPY, WITH POLYPECTOMY AND BIOPSY;  Surgeon: Juan Miguel Guan MD;  Location: [facility]     COLONOSCOPY N/A 1/25/2021    Procedure: COLONOSCOPY, WITH POLYPECTOMY AND BIOPSY;  Surgeon: Juan Miguel Guan MD;  Location: [facility] OR     left adnexal cystectomy Left 05/07/2002    Dr. Villalta     SALPINGO OOPHORECTOMY,R/L/BOBBY Right 2002    complete Dr. Villalta       Prior to Admission Medications  Current Outpatient Medications   Medication Sig Dispense Refill     cetirizine (ZYRTEC) 10 MG tablet Take 1 tablet (10 mg) by mouth daily (Patient taking differently: Take 10 mg by mouth daily as needed ) 90 tablet 3     cholecalciferol (VITAMIN D3) 1250 mcg (88020 units) capsule TAKE ONE Capsule BY MOUTH ONCE WEEKLY FOR EIGHT WEEKS.       hydrocortisone 2.5 % ointment Apply twice daily for 1-2 weeks to the rash under eyes  Then stop. Stop early if this resolved 15 g 0     pimecrolimus (ELIDEL) 1 % external cream APPLY TO EYELID TWICE DAILY       tretinoin (RETIN-A) 0.05 % external cream Apply topically At Bedtime 45 g 11       Allergies  No Known Allergies    Social History  Social History     Socioeconomic History     Marital status: Single     Spouse name: Not on file     Number of children: Not on file     Years of education: Not on file     Highest education level: Not on file   Occupational History     Occupation: 7/2021 starting with [employer].  working remotely HQ in [place]   Tobacco Use     Smoking status: Never Smoker     Smokeless tobacco: Never Used   Substance and Sexual Activity     Alcohol use: Yes     Alcohol/week: 0.8 - 1.7 standard drinks     Comment: occas      Drug use: No     Sexual activity: Not Currently      Partners: Male     Birth control/protection: Condom, None   Other Topics Concern     Parent/sibling w/ CABG, MI or angioplasty before 65F 55M? Not Asked      Service No     Blood Transfusions No     Caffeine Concern No     Occupational Exposure No     Hobby Hazards No     Sleep Concern No     Stress Concern Yes     Comment: needs alone time     Weight Concern No     Special Diet No     Back Care No     Exercise Yes     Bike Helmet Yes     Seat Belt Yes     Self-Exams Yes   Social History Narrative    How much exercise per week? Runs 3-4 times per week    How much calcium per day? Some, milk and yogurt       How much caffeine per day? 1-2 cups per day    How much vitamin D per day? None    Do you/your family wear seatbelts?  Yes    Do you/your family use safety helmets? Yes    Do you/your family use sunscreen? Yes    Do you/your family keep firearms in the home? No    Do you/your family have a smoke detector(s)? Yes        Do you feel safe in your home? Yes    Has anyone ever touched you in an unwanted manner? No     Explain          Maida 10, 2014 Estela Rodriguez BETHANIE                    5/14/14        Lives in [facility] near the [employer]. Enjoys running and road biking.     Dating, heterosexual.     Megan Uriostegui MD         Social Determinants of Health     Financial Resource Strain: Not on file   Food Insecurity: Not on file   Transportation Needs: Not on file   Physical Activity: Not on file   Stress: Not on file   Social Connections: Not on file   Intimate Partner Violence: Not on file   Housing Stability: Not on file       Family History  Family History   Problem Relation Age of Onset     Lipids Father      Osteoporosis Maternal Grandmother      Cerebrovascular Disease Maternal Grandfather      Cancer Paternal Grandmother         melanoma     Melanoma Paternal Grandmother      Asthma No family hx of      C.A.D. No family hx of      Colon Cancer No family hx of      Inflammatory Bowel Disease No  "family hx of      Anesthesia Reaction No family hx of      Bleeding Disorder No family hx of      Clotting Disorder No family hx of        Review of Systems  The complete review of systems is negative other than noted in the HPI or here.   Anesthesia Evaluation   Pt has had prior anesthetic. Type: MAC and General.    No history of anesthetic complications       ROS/MED HX  ENT/Pulmonary:     (+) allergic rhinitis,     Neurologic:  - neg neurologic ROS     Cardiovascular:  - neg cardiovascular ROS     METS/Exercise Tolerance: >4 METS    Hematologic:  - neg hematologic  ROS     Musculoskeletal:  - neg musculoskeletal ROS     GI/Hepatic: Comment: Colon polyps      Renal/Genitourinary: Comment: History of ovarian cyst s/p cystectomy       Endo:  - neg endo ROS     Psychiatric/Substance Use: Comment: Patient reports a history of adjustment insomnia and anxiety with a new job last year that is now improved and doesn't need propranolol - neg psychiatric ROS     Infectious Disease: Comment: Patient reports she tested positive for COVID-19 on 12/1/21. No residual symptoms.  - neg infectious disease ROS     Malignancy:  - neg malignancy ROS     Other: Comment: Acne  Atopic dermatitis  Hyperpigmentation infraorbital     (+) LMP: 3/1/22, ,         /88 (BP Location: Right arm, Patient Position: Sitting, Cuff Size: Adult Regular)   Pulse 70   Temp 97.9  F (36.6  C) (Oral)   Resp 16   Ht 1.778 m (5' 10\")   Wt 96.6 kg (213 lb)   LMP 03/01/2022 (Approximate)   SpO2 98%   Breastfeeding No   BMI 30.56 kg/m      Physical Exam   Constitutional: Awake, alert, cooperative, no apparent distress, and appears stated age.  Eyes: Pupils equal, round and reactive to light, extra ocular muscles intact, sclera clear, conjunctiva normal.  HENT: Normocephalic, oral pharynx with moist mucus membranes, good dentition. No goiter appreciated.   Respiratory: Clear to auscultation bilaterally, no crackles or wheezing.  Cardiovascular: " Regular rate and rhythm, normal S1 and S2, and no murmur noted.  Carotids +2, no bruits. No edema. Palpable pulses to radial  DP and PT arteries.   GI: Normal bowel sounds, soft, non-distended, non-tender, no masses palpated, no hepatosplenomegaly.  Surgical scars: well healed  Lymph/Hematologic: No cervical lymphadenopathy and no supraclavicular lymphadenopathy.  Genitourinary:  defer  Skin: Warm and dry.  No rashes at anticipated surgical site.   Musculoskeletal: Full ROM of neck. There is no redness, warmth, or swelling of the joints. Gross motor strength is normal.    Neurologic: Awake, alert, oriented to name, place and time. Cranial nerves II-XII are grossly intact. Gait is normal.   Neuropsychiatric: Calm, cooperative. Normal affect.     Prior Labs/Diagnostic Studies   All labs and imaging personally reviewed   Results for FILOMENA VALENCIA (MRN [id number]) as of 3/21/2022 15:06   Ref. Range 3/15/2019 09:06   WBC Latest Ref Range: 4.0 - 11.0 10e9/L 5.8   Hemoglobin Latest Ref Range: 11.7 - 15.7 g/dL 13.8   Hematocrit Latest Ref Range: 35.0 - 47.0 % 41.5   Platelet Count Latest Ref Range: 150 - 450 10e9/L 201   RBC Count Latest Ref Range: 3.8 - 5.2 10e12/L 4.43   MCV Latest Ref Range: 78 - 100 fl 94   MCH Latest Ref Range: 26.5 - 33.0 pg 31.2   MCHC Latest Ref Range: 31.5 - 36.5 g/dL 33.3   RDW Latest Ref Range: 10.0 - 15.0 % 13.6   Diff Method Unknown Automated Method   % Neutrophils Latest Units: % 44.6   % Lymphocytes Latest Units: % 44.3   % Monocytes Latest Units: % 6.4   % Eosinophils Latest Units: % 4.2   % Basophils Latest Units: % 0.3   % Immature Granulocytes Latest Units: % 0.2   Nucleated RBCs Latest Ref Range: 0 /100 0   Absolute Neutrophil Latest Ref Range: 1.6 - 8.3 10e9/L 2.6   Absolute Lymphocytes Latest Ref Range: 0.8 - 5.3 10e9/L 2.6   Absolute Monocytes Latest Ref Range: 0.0 - 1.3 10e9/L 0.4   Absolute Eosinophils Latest Ref Range: 0.0 - 0.7 10e9/L 0.2   Absolute Basophils Latest Ref Range:  "0.0 - 0.2 10e9/L 0.0   Abs Immature Granulocytes Latest Ref Range: 0 - 0.4 10e9/L 0.0   Absolute Nucleated RBC Unknown 0.0       EKG/ stress test - if available please see in ROS above   No results found.  No flowsheet data found.      The patient's records and results personally reviewed by this provider.     Outside records reviewed from: Care Everywhere      Assessment      Katty Mendoza is a 41 year old female seen as a PAC referral for risk assessment and optimization for anesthesia.    Plan/Recommendations  Pt will be optimized for the proposed procedure.  See below for details on the assessment, risk, and preoperative recommendations    NEUROLOGY  - No history of TIA, CVA or seizure  -Post Op delirium risk factors:  No risk identified    ENT  - No current airway concerns.  Will need to be reassessed day of surgery.  Mallampati: I  TM: > 3    CARDIAC  - No history of CAD, Hypertension and Afib  - METS (Metabolic Equivalents)  Patient performs 4 or more METS exercise without symptoms            Total Score: 0      RCRI-Very low risk: Class 1 0.4% complication rate            Total Score: 0        PULMONARY  - Obstructive Sleep Apnea  No current risk of obstructive sleep apnea   PHILIP Low Risk            Total Score: 0      - Denies asthma or inhaler use  - Seasonal allergies - continue zyrtec  - Tobacco History      History   Smoking Status     Never Smoker   Smokeless Tobacco     Never Used       GI  - history of colon polyps - procedure as above. The patient will complete her COVID test today.   PONV Medium Risk  Total Score: 2           1 AN PONV: Pt is Female    1 AN PONV: Patient is not a current smoker        /RENAL  - History of ovarian cyst s/p cystectomy     ENDOCRINE    - BMI: Estimated body mass index is 30.56 kg/m  as calculated from the following:    Height as of this encounter: 1.778 m (5' 10\").    Weight as of this encounter: 96.6 kg (213 lb).  Obesity (BMI >30)  - No history of Diabetes " Mellitus    HEME  VTE Low Risk 0.26%            Total Score: 0      - No history of abnormal bleeding or antiplatelet use.    DERM  ~ acne, atopic dermatitis, hyperpigmentation infraorbital - continue topical creams.     PSYCH  - history of adjustment insomnia/ anxiety with a new job last year that is now improved and doesn't need medications    ID  ~ Patient reports she tested positive for COVID-19 on 12/1/21. No residual symptoms.       The patient is optimized for their procedure.    Discussed that the  GI nurse should call her 1-2 days prior to procedure with pre-op instructions but reviewed general anesthesia instructions of no solid for 8 hours prior and clear liquids up until 2 hours prior.     On the day of service:     Prep time: 17 minutes  Visit time: 15 minutes  Documentation time: 4 minutes  ------------------------------------------  Total time: 36 minutes      Luciana Smith PA-C  Preoperative Assessment Center  [facility]  Phone: [phone]  Fax: [phone]

## 2022-03-21 NOTE — H&P
Pre-Operative H & P     CC:  Preoperative exam to assess for increased cardiopulmonary risk while undergoing surgery and anesthesia.    Date of Encounter: 3/21/2022  Primary Care Physician:  No Ref-Primary, Physician     Reason for visit:   Encounter Diagnoses   Name Primary?     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  The patient has been followed by gastroenterology after her first colonoscopy in 2019 was significant for multiple colon polyps.  One was 30 mm in size and was tubular adenoma with high-grade dysplasia which she had to return in 3 months for complete resection.  She has continued to have follow-up colonoscopies with her last one in January 25, 2021.  She now presents for follow-up colonoscopy.    History is obtained from the patient and chart review    Hx of abnormal bleeding or anti-platelet use: none    Menstrual history: Patient's last menstrual period was 03/01/2022 (approximate).:      Past Medical History  Past Medical History:   Diagnosis Date     Acne      Atopic dermatitis      Colon polyps      History of COVID-19      History of ovarian cyst      Seasonal allergic rhinitis        Past Surgical History  Past Surgical History:   Procedure Laterality Date     APPENDECTOMY  05/08/2002     Dr. Villalta     BREAST BIOPSY, RT/LT Right 2008    times 2; fibroadenoma     BREAST SURGERY  2007,2013     COLONOSCOPY N/A 4/22/2019    Procedure: COMBINED COLONOSCOPY, SINGLE OR MULTIPLE BIOPSY/POLYPECTOMY BY BIOPSY;  Surgeon: Juan Miguel Guan MD;  [facility]     COLONOSCOPY N/A 2/24/2020    Procedure: COLONOSCOPY, WITH POLYPECTOMY AND BIOPSY;  Surgeon: Juan Miguel Guan MD;  [facility]     COLONOSCOPY N/A 1/25/2021    Procedure: COLONOSCOPY, WITH POLYPECTOMY AND BIOPSY;  Surgeon: Juan Miguel Guan MD;  [facility]     left adnexal cystectomy Left 05/07/2002    Dr. Villalta     SALPINGO OOPHORECTOMY,R/L/BOBBY Right 2002    complete Dr. Villalta       Prior to Admission Medications  Current Outpatient Medications   Medication Sig Dispense Refill     cetirizine (ZYRTEC) 10 MG tablet Take 1 tablet (10 mg) by mouth daily (Patient taking differently: Take 10 mg by mouth daily as needed ) 90 tablet 3     cholecalciferol (VITAMIN D3) 1250 mcg (13100 units) capsule TAKE ONE Capsule BY MOUTH ONCE WEEKLY FOR EIGHT WEEKS.       hydrocortisone 2.5 % ointment Apply twice daily for 1-2 weeks to the rash under eyes  Then stop. Stop early if this resolved 15 g 0     pimecrolimus (ELIDEL) 1 % external cream APPLY TO EYELID TWICE DAILY       tretinoin (RETIN-A) 0.05 % external cream Apply topically At Bedtime 45 g 11       Allergies  No Known Allergies    Social History  Social History     Socioeconomic History     Marital status: Single     Spouse name: Not on file     Number of children: Not on file     Years of education: Not on file     Highest education level: Not on file   Occupational History     Occupation: 7/2021 starting with [employer].  working remotely HQ in [place]   Tobacco Use     Smoking status: Never Smoker     Smokeless tobacco: Never Used   Substance and Sexual Activity     Alcohol use: Yes     Alcohol/week: 0.8 - 1.7 standard drinks     Comment: occas      Drug use: No     Sexual activity: Not Currently      Partners: Male     Birth control/protection: Condom, None   Other Topics Concern     Parent/sibling w/ CABG, MI or angioplasty before 65F 55M? Not Asked      Service No     Blood Transfusions No     Caffeine Concern No     Occupational Exposure No     Hobby Hazards No     Sleep Concern No     Stress Concern Yes     Comment: needs alone time     Weight Concern No     Special Diet No     Back Care No     Exercise Yes     Bike Helmet Yes     Seat Belt Yes     Self-Exams Yes   Social History Narrative    How much exercise per week? Runs 3-4 times per week    How much calcium per day? Some, milk and yogurt       How much caffeine per day? 1-2 cups per day    How much vitamin D per day? None    Do you/your family wear seatbelts?  Yes    Do you/your family use safety helmets? Yes    Do you/your family use sunscreen? Yes    Do you/your family keep firearms in the home? No    Do you/your family have a smoke detector(s)? Yes        Do you feel safe in your home? Yes    Has anyone ever touched you in an unwanted manner? No     Explain          Maida 10, 2014 Estela Rodriguez BETHANIE                    5/14/14        Lives in [facility] near the [employer]. Enjoys running and road biking.     Dating, heterosexual.     Megan Uriostegui MD         Social Determinants of Health     Financial Resource Strain: Not on file   Food Insecurity: Not on file   Transportation Needs: Not on file   Physical Activity: Not on file   Stress: Not on file   Social Connections: Not on file   Intimate Partner Violence: Not on file   Housing Stability: Not on file       Family History  Family History   Problem Relation Age of Onset     Lipids Father      Osteoporosis Maternal Grandmother      Cerebrovascular Disease Maternal Grandfather      Cancer Paternal Grandmother         melanoma     Melanoma Paternal Grandmother      Asthma No family hx of      C.A.D. No family hx of      Colon Cancer No family hx of      Inflammatory Bowel Disease No  "family hx of      Anesthesia Reaction No family hx of      Bleeding Disorder No family hx of      Clotting Disorder No family hx of        Review of Systems  The complete review of systems is negative other than noted in the HPI or here.   Anesthesia Evaluation   Pt has had prior anesthetic. Type: MAC and General.    No history of anesthetic complications       ROS/MED HX  ENT/Pulmonary:     (+) allergic rhinitis,     Neurologic:  - neg neurologic ROS     Cardiovascular:  - neg cardiovascular ROS     METS/Exercise Tolerance: >4 METS    Hematologic:  - neg hematologic  ROS     Musculoskeletal:  - neg musculoskeletal ROS     GI/Hepatic: Comment: Colon polyps      Renal/Genitourinary: Comment: History of ovarian cyst s/p cystectomy       Endo:  - neg endo ROS     Psychiatric/Substance Use: Comment: Patient reports a history of adjustment insomnia and anxiety with a new job last year that is now improved and doesn't need propranolol - neg psychiatric ROS     Infectious Disease: Comment: Patient reports she tested positive for COVID-19 on 12/1/21. No residual symptoms.  - neg infectious disease ROS     Malignancy:  - neg malignancy ROS     Other: Comment: Acne  Atopic dermatitis  Hyperpigmentation infraorbital     (+) LMP: 3/1/22, ,         /88 (BP Location: Right arm, Patient Position: Sitting, Cuff Size: Adult Regular)   Pulse 70   Temp 97.9  F (36.6  C) (Oral)   Resp 16   Ht 1.778 m (5' 10\")   Wt 96.6 kg (213 lb)   LMP 03/01/2022 (Approximate)   SpO2 98%   Breastfeeding No   BMI 30.56 kg/m      Physical Exam   Constitutional: Awake, alert, cooperative, no apparent distress, and appears stated age.  Eyes: Pupils equal, round and reactive to light, extra ocular muscles intact, sclera clear, conjunctiva normal.  HENT: Normocephalic, oral pharynx with moist mucus membranes, good dentition. No goiter appreciated.   Respiratory: Clear to auscultation bilaterally, no crackles or wheezing.  Cardiovascular: " Regular rate and rhythm, normal S1 and S2, and no murmur noted.  Carotids +2, no bruits. No edema. Palpable pulses to radial  DP and PT arteries.   GI: Normal bowel sounds, soft, non-distended, non-tender, no masses palpated, no hepatosplenomegaly.  Surgical scars: well healed  Lymph/Hematologic: No cervical lymphadenopathy and no supraclavicular lymphadenopathy.  Genitourinary:  defer  Skin: Warm and dry.  No rashes at anticipated surgical site.   Musculoskeletal: Full ROM of neck. There is no redness, warmth, or swelling of the joints. Gross motor strength is normal.    Neurologic: Awake, alert, oriented to name, place and time. Cranial nerves II-XII are grossly intact. Gait is normal.   Neuropsychiatric: Calm, cooperative. Normal affect.     Prior Labs/Diagnostic Studies   All labs and imaging personally reviewed   Results for FILOMENA VALENCIA (MRN [id number]) as of 3/21/2022 15:06   Ref. Range 3/15/2019 09:06   WBC Latest Ref Range: 4.0 - 11.0 10e9/L 5.8   Hemoglobin Latest Ref Range: 11.7 - 15.7 g/dL 13.8   Hematocrit Latest Ref Range: 35.0 - 47.0 % 41.5   Platelet Count Latest Ref Range: 150 - 450 10e9/L 201   RBC Count Latest Ref Range: 3.8 - 5.2 10e12/L 4.43   MCV Latest Ref Range: 78 - 100 fl 94   MCH Latest Ref Range: 26.5 - 33.0 pg 31.2   MCHC Latest Ref Range: 31.5 - 36.5 g/dL 33.3   RDW Latest Ref Range: 10.0 - 15.0 % 13.6   Diff Method Unknown Automated Method   % Neutrophils Latest Units: % 44.6   % Lymphocytes Latest Units: % 44.3   % Monocytes Latest Units: % 6.4   % Eosinophils Latest Units: % 4.2   % Basophils Latest Units: % 0.3   % Immature Granulocytes Latest Units: % 0.2   Nucleated RBCs Latest Ref Range: 0 /100 0   Absolute Neutrophil Latest Ref Range: 1.6 - 8.3 10e9/L 2.6   Absolute Lymphocytes Latest Ref Range: 0.8 - 5.3 10e9/L 2.6   Absolute Monocytes Latest Ref Range: 0.0 - 1.3 10e9/L 0.4   Absolute Eosinophils Latest Ref Range: 0.0 - 0.7 10e9/L 0.2   Absolute Basophils Latest Ref Range:  "0.0 - 0.2 10e9/L 0.0   Abs Immature Granulocytes Latest Ref Range: 0 - 0.4 10e9/L 0.0   Absolute Nucleated RBC Unknown 0.0       EKG/ stress test - if available please see in ROS above   No results found.  No flowsheet data found.      The patient's records and results personally reviewed by this provider.     Outside records reviewed from: Care Everywhere      Assessment      Katty Mendoza is a 41 year old female seen as a PAC referral for risk assessment and optimization for anesthesia.    Plan/Recommendations  Pt will be optimized for the proposed procedure.  See below for details on the assessment, risk, and preoperative recommendations    NEUROLOGY  - No history of TIA, CVA or seizure  -Post Op delirium risk factors:  No risk identified    ENT  - No current airway concerns.  Will need to be reassessed day of surgery.  Mallampati: I  TM: > 3    CARDIAC  - No history of CAD, Hypertension and Afib  - METS (Metabolic Equivalents)  Patient performs 4 or more METS exercise without symptoms            Total Score: 0      RCRI-Very low risk: Class 1 0.4% complication rate            Total Score: 0        PULMONARY  - Obstructive Sleep Apnea  No current risk of obstructive sleep apnea   PHILIP Low Risk            Total Score: 0      - Denies asthma or inhaler use  - Seasonal allergies - continue zyrtec  - Tobacco History      History   Smoking Status     Never Smoker   Smokeless Tobacco     Never Used       GI  - history of colon polyps - procedure as above. The patient will complete her COVID test today.   PONV Medium Risk  Total Score: 2           1 AN PONV: Pt is Female    1 AN PONV: Patient is not a current smoker        /RENAL  - History of ovarian cyst s/p cystectomy     ENDOCRINE    - BMI: Estimated body mass index is 30.56 kg/m  as calculated from the following:    Height as of this encounter: 1.778 m (5' 10\").    Weight as of this encounter: 96.6 kg (213 lb).  Obesity (BMI >30)  - No history of Diabetes " Mellitus    HEME  VTE Low Risk 0.26%            Total Score: 0      - No history of abnormal bleeding or antiplatelet use.    DERM  ~ acne, atopic dermatitis, hyperpigmentation infraorbital - continue topical creams.     PSYCH  - history of adjustment insomnia/ anxiety with a new job last year that is now improved and doesn't need medications    ID  ~ Patient reports she tested positive for COVID-19 on 12/1/21. No residual symptoms.       The patient is optimized for their procedure.    Discussed that the  GI nurse should call her 1-2 days prior to procedure with pre-op instructions but reviewed general anesthesia instructions of no solid for 8 hours prior and clear liquids up until 2 hours prior.     On the day of service:     Prep time: 17 minutes  Visit time: 15 minutes  Documentation time: 4 minutes  ------------------------------------------  Total time: 36 minutes      Luciana Smith PA-C  Preoperative Assessment Center  [facility]  Clinic and Surgery Center  Phone: [phone]  Fax: [phone]

## 2022-03-22 LAB — SARS-COV-2 RNA RESP QL NAA+PROBE: NEGATIVE

## 2022-03-24 ENCOUNTER — HOSPITAL ENCOUNTER (OUTPATIENT)
Facility: CLINIC | Age: 42
Discharge: HOME | End: 2022-03-24
Attending: INTERNAL MEDICINE | Admitting: INTERNAL MEDICINE
Payer: COMMERCIAL

## 2022-03-24 ENCOUNTER — ANESTHESIA (OUTPATIENT)
Dept: GASTROENTEROLOGY | Facility: CLINIC | Age: 42
End: 2022-03-24
Payer: COMMERCIAL

## 2022-03-24 VITALS — OXYGEN SATURATION: 100 % | DIASTOLIC BLOOD PRESSURE: 76 MMHG | SYSTOLIC BLOOD PRESSURE: 110 MMHG | HEART RATE: 60 BPM

## 2022-03-24 LAB — COLONOSCOPY: NORMAL

## 2022-03-24 PROCEDURE — 258N000003 HC RX IP 258 OP 636: Performed by: NURSE ANESTHETIST, CERTIFIED REGISTERED

## 2022-03-24 PROCEDURE — 45385 COLONOSCOPY W/LESION REMOVAL: CPT | Mod: PT | Performed by: INTERNAL MEDICINE

## 2022-03-24 PROCEDURE — 250N000011 HC RX IP 250 OP 636: Performed by: NURSE ANESTHETIST, CERTIFIED REGISTERED

## 2022-03-24 PROCEDURE — 999N000010 HC STATISTIC ANES STAT CODE-CRNA PER MINUTE: Performed by: INTERNAL MEDICINE

## 2022-03-24 PROCEDURE — 370N000017 HC ANESTHESIA TECHNICAL FEE, PER MIN: Performed by: INTERNAL MEDICINE

## 2022-03-24 PROCEDURE — 88305 TISSUE EXAM BY PATHOLOGIST: CPT | Mod: TC | Performed by: INTERNAL MEDICINE

## 2022-03-24 PROCEDURE — 250N000009 HC RX 250: Performed by: NURSE ANESTHETIST, CERTIFIED REGISTERED

## 2022-03-24 RX ORDER — NALOXONE HYDROCHLORIDE 0.4 MG/ML
0.4 INJECTION, SOLUTION INTRAMUSCULAR; INTRAVENOUS; SUBCUTANEOUS
Status: CANCELLED | OUTPATIENT
Start: 2022-03-24

## 2022-03-24 RX ORDER — NALOXONE HYDROCHLORIDE 0.4 MG/ML
0.2 INJECTION, SOLUTION INTRAMUSCULAR; INTRAVENOUS; SUBCUTANEOUS
Status: CANCELLED | OUTPATIENT
Start: 2022-03-24

## 2022-03-24 RX ORDER — LIDOCAINE HYDROCHLORIDE 20 MG/ML
INJECTION, SOLUTION INFILTRATION; PERINEURAL PRN
Status: DISCONTINUED | OUTPATIENT
Start: 2022-03-24 | End: 2022-03-24

## 2022-03-24 RX ORDER — PROCHLORPERAZINE MALEATE 10 MG
10 TABLET ORAL EVERY 6 HOURS PRN
Status: CANCELLED | OUTPATIENT
Start: 2022-03-24

## 2022-03-24 RX ORDER — SODIUM CHLORIDE, SODIUM LACTATE, POTASSIUM CHLORIDE, CALCIUM CHLORIDE 600; 310; 30; 20 MG/100ML; MG/100ML; MG/100ML; MG/100ML
INJECTION, SOLUTION INTRAVENOUS CONTINUOUS PRN
Status: DISCONTINUED | OUTPATIENT
Start: 2022-03-24 | End: 2022-03-24

## 2022-03-24 RX ORDER — ONDANSETRON 4 MG/1
4 TABLET, ORALLY DISINTEGRATING ORAL EVERY 6 HOURS PRN
Status: CANCELLED | OUTPATIENT
Start: 2022-03-24

## 2022-03-24 RX ORDER — ONDANSETRON 2 MG/ML
4 INJECTION INTRAMUSCULAR; INTRAVENOUS EVERY 6 HOURS PRN
Status: CANCELLED | OUTPATIENT
Start: 2022-03-24

## 2022-03-24 RX ORDER — ONDANSETRON 2 MG/ML
INJECTION INTRAMUSCULAR; INTRAVENOUS PRN
Status: DISCONTINUED | OUTPATIENT
Start: 2022-03-24 | End: 2022-03-24

## 2022-03-24 RX ORDER — ONDANSETRON 2 MG/ML
4 INJECTION INTRAMUSCULAR; INTRAVENOUS
Status: DISCONTINUED | OUTPATIENT
Start: 2022-03-24 | End: 2022-03-24 | Stop reason: HOSPADM

## 2022-03-24 RX ORDER — PROPOFOL 10 MG/ML
INJECTION, EMULSION INTRAVENOUS CONTINUOUS PRN
Status: DISCONTINUED | OUTPATIENT
Start: 2022-03-24 | End: 2022-03-24

## 2022-03-24 RX ORDER — FLUMAZENIL 0.1 MG/ML
0.2 INJECTION, SOLUTION INTRAVENOUS
Status: CANCELLED | OUTPATIENT
Start: 2022-03-24 | End: 2022-03-25

## 2022-03-24 RX ORDER — LIDOCAINE 40 MG/G
CREAM TOPICAL
Status: DISCONTINUED | OUTPATIENT
Start: 2022-03-24 | End: 2022-03-24 | Stop reason: HOSPADM

## 2022-03-24 RX ORDER — DEXMEDETOMIDINE HYDROCHLORIDE 4 UG/ML
INJECTION, SOLUTION INTRAVENOUS PRN
Status: DISCONTINUED | OUTPATIENT
Start: 2022-03-24 | End: 2022-03-24

## 2022-03-24 RX ADMIN — SODIUM CHLORIDE, POTASSIUM CHLORIDE, SODIUM LACTATE AND CALCIUM CHLORIDE: 600; 310; 30; 20 INJECTION, SOLUTION INTRAVENOUS at 12:00

## 2022-03-24 RX ADMIN — PROPOFOL 200 MCG/KG/MIN: 10 INJECTION, EMULSION INTRAVENOUS at 12:00

## 2022-03-24 RX ADMIN — ONDANSETRON 4 MG: 2 INJECTION INTRAMUSCULAR; INTRAVENOUS at 12:00

## 2022-03-24 RX ADMIN — LIDOCAINE HYDROCHLORIDE 40 MG: 20 INJECTION, SOLUTION INFILTRATION; PERINEURAL at 12:00

## 2022-03-24 RX ADMIN — DEXMEDETOMIDINE 12 MCG: 100 INJECTION, SOLUTION, CONCENTRATE INTRAVENOUS at 12:00

## 2022-03-24 ASSESSMENT — LIFESTYLE VARIABLES: TOBACCO_USE: 0

## 2022-03-24 NOTE — ANESTHESIA PREPROCEDURE EVALUATION
Anesthesia Pre-Procedure Evaluation    Patient: Katty Mendoza   MRN: [id number] : 1980        Procedure : Procedure(s):  COLONOSCOPY          Past Medical History:   Diagnosis Date     Acne      Atopic dermatitis      Colon polyps      History of COVID-19      History of ovarian cyst      Seasonal allergic rhinitis       Past Surgical History:   Procedure Laterality Date     APPENDECTOMY  2002    Dr. Villalta     BREAST BIOPSY, RT/LT Right 2008    times 2; fibroadenoma     BREAST SURGERY  ,     COLONOSCOPY N/A 2019    Procedure: COMBINED COLONOSCOPY, SINGLE OR MULTIPLE BIOPSY/POLYPECTOMY BY BIOPSY;  Surgeon: Juan Miguel Guan MD;  [facility]     COLONOSCOPY N/A 2020    Procedure: COLONOSCOPY, WITH POLYPECTOMY AND BIOPSY;  Surgeon: Juan Miguel Guan MD;  [facility]     COLONOSCOPY N/A 2021    Procedure: COLONOSCOPY, WITH POLYPECTOMY AND BIOPSY;  Surgeon: Juan Miguel Guan MD;  [facility]     left adnexal cystectomy Left 2002    Dr. Villalta     SALPINGO OOPHORECTOMY,R/L/BOBBY Right     complete Dr. Villalta      No Known Allergies   Social History     Tobacco Use     Smoking status: Never Smoker     Smokeless tobacco: Never Used   Substance Use Topics     Alcohol use: Yes     Alcohol/week: 0.8 - 1.7 standard drinks     Comment: occas       Wt Readings from Last 1 Encounters:   22 96.6 kg (213 lb)        Anesthesia Evaluation   Pt has had prior anesthetic.     No history of anesthetic complications       ROS/MED HX  ENT/Pulmonary:     (+) allergic rhinitis,  (-) tobacco use   Neurologic:       Cardiovascular:       METS/Exercise Tolerance:     Hematologic:       Musculoskeletal:       GI/Hepatic:       Renal/Genitourinary:       Endo:       Psychiatric/Substance Use:       Infectious Disease:       Malignancy:       Other:            Physical Exam    Airway        Mallampati: I   TM distance: > 3 FB   Neck ROM: full   Mouth opening: > 3 cm    Respiratory Devices  and Support         Dental  no notable dental history         Cardiovascular          Rhythm and rate: regular and normal     Pulmonary           breath sounds clear to auscultation           OUTSIDE LABS:  CBC:   Lab Results   Component Value Date    WBC 5.8 03/15/2019    HGB 13.8 03/15/2019    HCT 41.5 03/15/2019     03/15/2019     BMP:   Lab Results   Component Value Date    GLC 87 07/06/2018     COAGS: No results found for: PTT, INR, FIBR  POC:   Lab Results   Component Value Date     (H) 07/18/2019    HCG Negative 07/18/2019     HEPATIC: No results found for: ALBUMIN, PROTTOTAL, ALT, AST, GGT, ALKPHOS, BILITOTAL, BILIDIRECT, MARIA  OTHER:   Lab Results   Component Value Date    TSH 2.51 03/15/2019       Anesthesia Plan    ASA Status:  2   NPO Status:  NPO Appropriate    Anesthesia Type: MAC.     - Reason for MAC: straight local not clinically adequate              Consents    Anesthesia Plan(s) and associated risks, benefits, and realistic alternatives discussed. Questions answered and patient/representative(s) expressed understanding.    - Discussed:     - Discussed with:  Patient         Postoperative Care            Comments:                Patricio Walker MD

## 2022-03-24 NOTE — ANESTHESIA POSTPROCEDURE EVALUATION
Patient: Katty Mendoza    Procedure: Procedure(s):  COLONOSCOPY, FLEXIBLE, WITH LESION REMOVAL USING SNARE       Anesthesia Type:  MAC    Note:     Postop Pain Control: Uneventful            Sign Out: Well controlled pain   PONV: No   Neuro/Psych: Uneventful            Sign Out: Acceptable/Baseline neuro status   Airway/Respiratory: Uneventful            Sign Out: Acceptable/Baseline resp. status   CV/Hemodynamics: Uneventful            Sign Out: Acceptable CV status   Other NRE: NONE   DID A NON-ROUTINE EVENT OCCUR? No           Last vitals:  Vitals Value Taken Time   /76 03/24/22 1300   Temp     Pulse 56 03/24/22 1309   Resp 18 03/24/22 1309   SpO2 100 % 03/24/22 1309   Vitals shown include unvalidated device data.    Electronically Signed By: Patricio Walker MD  March 24, 2022  1:21 PM

## 2022-03-24 NOTE — INTERVAL H&P NOTE
"I have reviewed the surgical (or preoperative) H&P that is linked to this encounter, and examined the patient. There are no significant changes    Clinical Conditions Present on Arrival:  SECTIONPRESENTONADMISSIONBEGIN@                   # Obesity: Estimated body mass index is 30.56 kg/m  as calculated from the following:    Height as of 3/21/22: 1.778 m (5' 10\").    Weight as of 3/21/22: 96.6 kg (213 lb).       "

## 2022-03-24 NOTE — ANESTHESIA CARE TRANSFER NOTE
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    Anesthesia Type:   MAC     Note:        Oxygen Supplementation: face mask  Level of Supplemental Oxygen (L/min / FiO2): 6  Independent Airway: airway patency satisfactory and stable  Dentition: dentition unchanged  Vital Signs Stable: post-procedure vital signs reviewed and stable  Report to RN Given: handoff report given  Patient transferred to: PACU  Comments: To PACU: Arouses easily, good airway, 02 face mask, VSS  Report to RN  Handoff Report: Identifed the Patient, Identified the Reponsible Provider, Reviewed the pertinent medical history, Discussed the surgical course, Reviewed Intra-OP anesthesia mangement and issues during anesthesia, Set expectations for post-procedure period and Allowed opportunity for questions and acknowledgement of understanding      Vitals:  Vitals Value Taken Time   BP     Temp     Pulse 67 03/24/22 1236   Resp 17 03/24/22 1236   SpO2 100 % 03/24/22 1236   Vitals shown include unvalidated device data.    Electronically Signed By: GREGORIO Sauceda CRNA  March 24, 2022  12:37 PM

## 2022-03-28 LAB
PATH REPORT.COMMENTS IMP SPEC: NORMAL
PATH REPORT.COMMENTS IMP SPEC: NORMAL
PATH REPORT.FINAL DX SPEC: NORMAL
PATH REPORT.GROSS SPEC: NORMAL
PATH REPORT.MICROSCOPIC SPEC OTHER STN: NORMAL
PATH REPORT.RELEVANT HX SPEC: NORMAL
PHOTO IMAGE: NORMAL

## 2022-03-28 PROCEDURE — 88305 TISSUE EXAM BY PATHOLOGIST: CPT | Mod: 26 | Performed by: PATHOLOGY

## 2022-03-29 NOTE — RESULT ENCOUNTER NOTE
Pathology from colonoscopy reviewed. One adenoma removed. Plan on repeat colonoscopy in 1-2 years.    Nicole, can you set a reminder a year from now? Thanks.    Juan Miguel Guan MD  [facility]  Division of Gastroenterology and Hepatology  [facility] [address] [place], [place] [address]

## 2022-05-24 ENCOUNTER — OFFICE VISIT (OUTPATIENT)
Dept: DERMATOLOGY | Facility: CLINIC | Age: 42
End: 2022-05-24

## 2022-05-24 ENCOUNTER — ALLIED HEALTH/NURSE VISIT (OUTPATIENT)
Dept: DERMATOLOGY | Facility: CLINIC | Age: 42
End: 2022-05-24
Payer: COMMERCIAL

## 2022-05-24 DIAGNOSIS — R23.8 FACIAL VOLUME DEPLETION: Primary | ICD-10-CM

## 2022-05-24 DIAGNOSIS — L30.9 DERMATITIS: ICD-10-CM

## 2022-05-24 DIAGNOSIS — L70.0 ACNE VULGARIS: ICD-10-CM

## 2022-05-24 PROCEDURE — 96999 UNLISTED SPEC DERM SVC/PX: CPT | Performed by: DERMATOLOGY

## 2022-05-24 PROCEDURE — 99212 OFFICE O/P EST SF 10 MIN: CPT | Performed by: DERMATOLOGY

## 2022-05-24 PROCEDURE — 99207 PR NO CHARGE NURSE ONLY: CPT | Performed by: DERMATOLOGY

## 2022-05-24 RX ORDER — TRETINOIN 0.5 MG/G
CREAM TOPICAL AT BEDTIME
Qty: 45 G | Refills: 11 | Status: SHIPPED | OUTPATIENT
Start: 2022-05-24

## 2022-05-24 RX ORDER — HYDROCORTISONE 25 MG/G
OINTMENT TOPICAL
Qty: 15 G | Refills: 0 | Status: SHIPPED | OUTPATIENT
Start: 2022-05-24

## 2022-05-24 NOTE — LETTER
5/24/2022         RE: Katty Mendoza  [address]  [place] [address]        Dear Colleague,    Thank you for referring your patient, Katty Mendoza, to the [facility]. Please see a copy of my visit note below.    [facility] D[facility] Note  Encounter Date: May 24, 2022  Office Visit     Dermatology Problem List:  1. Acne Vulgaris  - Current Rx: tretinoin 0.05% cream  2. Atopic dermatitis, seasonal allergies  - Current Rx: cetirizine 10mg every day, frequent emollient use , Elidel  - Elidel: negative rxn.  3. Keratosis pilaris  4. Mildly dysplastic nevus, on the left knee biopsy 1/4/21     ____________________________________________     Assessment & Plan:    # History of DN. No clinical evidence of recurrence.  - Recommend  Skin exam     # Hyperpigmentation - infraorbital   -okay for azelaic acid     # Atopic dermatitis- not active today, uses steroid once monthly  - Continue hydrocortisone 2.5%. Refilled.     # Acne Vulgaris no active  - Cotninue tretinoin 0.05% cream     Procedures Performed:   None     Follow-up: for skin exam (sent to pool) and 1 year fro filler    Staff and Scribe:     Scribe Disclosure:   I, Tyshawn Hayes, am serving as a scribe to document services personally performed by this physician, Dr. Candelaria Taylor, based on data collection and the provider's statements to me.     Provider Disclosure:   The documentation recorded by the scribe accurately reflects the services I personally performed and the decisions made by me.    Candelaria Taylor MD    Department of Dermatology  [facility]: Phone: [phone], Fax:[phone]  [facility]: Phone: [phone], Fax: [phone]      ____________________________________________    CC: Derm Problem    HPI:  Ms. Katty Mendoza is a(n) 41 year old female who presents  today as a return patient for a spot check.     Last seen on 6/4/21 for a spot check.     Today, patient wants refill on tretinoin and hydrocortisone.     Patient is otherwise feeling well, without additional skin concerns.     Labs Reviewed:  N/A    Physical Exam:  Vitals: There were no vitals taken for this visit.  SKIN: Focused examination of the face was performed.  - no acneiform lesions  - No other lesions of concern on areas examined.     Medications:  Current Outpatient Medications   Medication     cetirizine (ZYRTEC) 10 MG tablet     cholecalciferol (VITAMIN D3) 1250 mcg (68590 units) capsule     hydrocortisone 2.5 % ointment     tretinoin (RETIN-A) 0.05 % external cream     No current facility-administered medications for this visit.      Past Medical History:   Patient Active Problem List   Diagnosis     Vitamin D deficiency     Past Medical History:   Diagnosis Date     Acne      Atopic dermatitis      Colon polyps      History of COVID-19      History of ovarian cyst      Seasonal allergic rhinitis         CC No referring provider defined for this encounter. on close of this encounter.     I left voicemail for patient to call back and schedule in person skin exam with Dr. Taylor.    Beckie stuart [facility]   Dermatology, General and Plastic Surgery, Urology Specialties   [facility] [place] [address]          Again, thank you for allowing me to participate in the care of your patient.        Sincerely,        Candelaria Taylor MD

## 2022-05-24 NOTE — PROGRESS NOTES
[facility] Dermatology Note  Encounter Date: May 24, 2022  Office Visit     Dermatology Problem List:  1. Acne Vulgaris  - Current Rx: tretinoin 0.05% cream  2. Atopic dermatitis, seasonal allergies  - Current Rx: cetirizine 10mg every day, frequent emollient use , Elidel  - Elidel: negative rxn.  3. Keratosis pilaris  4. Mildly dysplastic nevus, on the left knee biopsy 1/4/21     ____________________________________________     Assessment & Plan:    # History of DN. No clinical evidence of recurrence.  - Recommend  Skin exam     # Hyperpigmentation - infraorbital   -okay for azelaic acid     # Atopic dermatitis- not active today, uses steroid once monthly  - Continue hydrocortisone 2.5%. Refilled.     # Acne Vulgaris no active  - Cotninue tretinoin 0.05% cream     Procedures Performed:   None     Follow-up: for skin exam (sent to pool) and 1 year fro filler    Staff and Scribe:     Scribe Disclosure:   ITyshawn, am serving as a scribe to document services personally performed by this physician, Dr. Candelaria Taylor, based on data collection and the provider's statements to me.     Provider Disclosure:   The documentation recorded by the scribe accurately reflects the services I personally performed and the decisions made by me.    Candelaria Taylor MD    Department of Dermatology  [facility]: Phone: [phone], Fax:[phone]  [facility]: Phone: [phone], Fax: [phone]      ____________________________________________    CC: Derm Problem    HPI:  Ms. Katty Mendoza is a(n) 41 year old female who presents today as a return patient for a spot check.     Last seen on 6/4/21 for a spot check.     Today, patient wants refill on tretinoin and hydrocortisone.     Patient is otherwise feeling well, without additional skin concerns.     Labs Reviewed:  N/A    Physical  Exam:  Vitals: There were no vitals taken for this visit.  SKIN: Focused examination of the face was performed.  - no acneiform lesions  - No other lesions of concern on areas examined.     Medications:  Current Outpatient Medications   Medication     cetirizine (ZYRTEC) 10 MG tablet     cholecalciferol (VITAMIN D3) 1250 mcg (25029 units) capsule     hydrocortisone 2.5 % ointment     tretinoin (RETIN-A) 0.05 % external cream     No current facility-administered medications for this visit.      Past Medical History:   Patient Active Problem List   Diagnosis     Vitamin D deficiency     Past Medical History:   Diagnosis Date     Acne      Atopic dermatitis      Colon polyps      History of COVID-19      History of ovarian cyst      Seasonal allergic rhinitis         CC No referring provider defined for this encounter. on close of this encounter.

## 2022-05-24 NOTE — NURSING NOTE
BLT applied to the cheeks and marionette lines.    Lot# [id number]  Exp: 09/05/22  NDC:[address]-72    Bethany Strickland LPN

## 2022-05-24 NOTE — PATIENT INSTRUCTIONS
Filler Information:    Risks of the procedure:I will have pain, bruising, redness, and swelling after the procedure and lasting for approximately 1-3 weeks. Risks are lumps/bumps, skin discoloration, bleeding, numbness, infection, granuloma formation, scar, ulceration, under correction, over correction and rarely, risks of stroke and blindness. Multiple treatments may be required.      What are  fillers?    Fillers are injected into the skin to soften crease or folds, support areas of volume loss or contour specific facial areas.  You may experience a mild to moderate amount of stinging or aching sensation post injection.  To ensure an even correction, the physician will massage the area treated, which may cause a temporary amount of redness to your skin.  Bruising at the site of injection is common and may last two weeks  Temporary minimal to moderate swelling can be expected, which should gradually improve following injection  It is normal to experience some tenderness at the treatment site for a few days    After treatment care instructions:  Apply an ice pack or cold compress to the injection area after treatment to help reduce swelling.  Keep your head elevated (even while sleeping) as much as possible and avoid sleeping on your side or stomach  No alcohol consumption or exercise for the first 24 hours after treatment.  Do not touch the treated area for 6 hours  You may use make-up, creams and sunscreens after 24 hours  You may return to normal/routine activities but you should check with your physician for their recommendation  Avoid excessive scrubbing or rubbing of the injection area  If you have previously suffered from cold sores, there is a risk that the needle punctures around the mouth/lips could contribute to another recurrence  Immediately report to your physician if you have any of the following:  Delayed swelling happening 7-14 days after treatment  Numbness lasting 3-4 days  Muscle weakness in the  area of injection  Severe pain  Dusky discoloration of one part of the face  Bruising  Changes in vision or eye pain  Cold sore  Avoid COVID vaccination 2 weeks after filler and dental procedures 4 weeks after filler    Who should I call with questions?  [facility]: [phone]   [facility]: [phone]  For urgent needs outside of business hours call the [facility] at [phone] and ask for the resident on call  MyChart messages may be delayed, call for urgent issues

## 2022-05-24 NOTE — PATIENT INSTRUCTIONS
[facility] Dermatology Visit    Thank you for allowing us to participate in your care. Your findings, instructions and follow-up plan are as follows:         When should I call my doctor?  If you are worsening or not improving, please, contact us or seek urgent care as noted below.     Who should I call with questions (adults)?  [facility] (adult and pediatric): [phone]  [facility] (adult): [phone]  For urgent needs outside of business hours call the [facility] at [phone] and ask for the dermatology resident on call  If this is a medical emergency and you are unable to reach an ER, Call 911    Who should I call with questions (pediatric)?  [facility]- Pediatric Dermatology  Dr. Majo Wise, Dr. Juliana Bianchi, Dr. Rebecca Wild, Priya Ray, PA  Dr. Jackelin Rivera, Dr. Yoana Beauchamp & Dr. Axel Yip  Non Urgent  Nurse Triage Line; [phone]- Kirsty and Vero RN Care Coordinators   [facility] (/Complex ) [phone]    If you need a prescription refill, please contact your pharmacy. Refills are approved or denied by our physicians during normal business hours, Monday through Fridays  Per office policy, refills will not be granted if you have not been seen within the past year (or sooner depending on your child's condition).    Scheduling Information:  Pediatric Appointment Scheduling and Call Center [phone]  Radiology Scheduling- [phone]  Sedation Unit Scheduling- [phone]  [facility] Scheduling- General [phone]; Pediatric Dermatology [phone]  Main  Services: [phone]  Rwandan: [phone]  Thai: [phone]  Hmong/Shane/Iranian: [phone]  Preadmission Nursing Department Fax Number: [phone] (fax all pre-operative paperwork to this number)    For urgent matters arising during evenings, weekends, or  holidays that cannot wait for normal business hours please call [phone] and ask for the dermatology resident on call to be paged.

## 2022-05-24 NOTE — NURSING NOTE
Katty Mendoza's goals for this visit include:   Chief Complaint   Patient presents with     Derm Problem     Filler, would like refills of hydrocortisone and Retin A       She requests these members of her care team be copied on today's visit information: no    PCP: No Ref-Primary, Physician    Referring Provider:  No referring provider defined for this encounter.    There were no vitals taken for this visit.    Do you need any medication refills at today's visit? Yes

## 2022-05-24 NOTE — PROGRESS NOTES
Soft Tissue Augmentation Procedure Note: Cosmetic      Procedure Date: 5/24/2022    Attending Staff: Candelaria Taylor MD    Resident: Mounika    Assistant:  Eulalia Aburto- assisted    Diagnosis:Facial  volume depletion    A 25 gauge cannulas was used today.     Location: Cheeks   Product:  Resylane lyft with lidocaine   Amount: 1 cc  Lot #: [id number]  Exp Date: 10/31/24    Location: marionette line  Product:  Resylane lyft with lidocaine   Amount: 1 cc  Lot #: [id number]  Exp Date: 10/31/24      Description of Operation/Procedure:   The nature and purpose of the procedure, associated risks, possible consequences and complications, and alternative methods of treatment were explained in detail including but not limited to bruising, pain, redness,lumps/bumps, granuloma formation, scar blindness, stroke, ulceration, ischemia, under correction, over correction, swelling, possible need for multiple treatments, infection, granuloma, pain, dyspigmentation, numbness, weakness or tingling were explained to the patient. We discussed that multiple treatments may be required. The patient verbalized understanding. Photo consent and signed informed consent were obtained.Time-out was performed and patient denied history of severe allergy to bees.  Denies recent upcoming dental procedures or covid vaccine in the last 2 weeks.      The facial areas were cleansed with alcohol then chlorhexidine and injections were performed to bone on cheeks, for lower face cannula was used with 0.1cc of lido no epi at each marionette line followed by 25 cannula.  The patient tolerated the procedure well and there were no complications. Ice was provided post-procedure. The patient was provided after care instructions and will follow-up  As needed    The patient will pay cosmetic fee today.       Staff:  Scribe/Staff    Scribe Disclosure:   Tyshawn RIOS, am serving as a scribe to document services personally performed by this physician,  Dr. Candelaria Taylor, based on data collection and the provider's statements to me.     I did the entire procedure    Provider Disclosure:   The documentation recorded by the scribe accurately reflects the services I personally performed and the decisions made by me.    Candelaria Taylor MD    Department of Dermatology  [facility]: Phone: [phone], Fax:[phone]  [facility]: Phone: [phone], Fax: [phone]

## 2022-05-24 NOTE — LETTER
5/24/2022         RE: Katty Mendoza  [address]  [place] [address]        Dear Colleague,    Thank you for referring your patient, Katty Mendoza, to the [facility]. Please see a copy of my visit note below.      Soft Tissue Augmentation Procedure Note: Cosmetic      Procedure Date: 5/24/2022    Attending Staff: Candelaria Taylor MD    Resident: Mounika    Assistant:  Eulalia Aburto- assisted    Diagnosis:Facial  volume depletion    A 25 gauge cannulas was used today.     Location: Cheeks   Product:  Resylane lyft with lidocaine   Amount: 1 cc  Lot #: [id number]  Exp Date: 10/31/24    Location: marionette line  Product:  Resylane lyft with lidocaine   Amount: 1 cc  Lot #: [id number]  Exp Date: 10/31/24      Description of Operation/Procedure:   The nature and purpose of the procedure, associated risks, possible consequences and complications, and alternative methods of treatment were explained in detail including but not limited to bruising, pain, redness,lumps/bumps, granuloma formation, scar blindness, stroke, ulceration, ischemia, under correction, over correction, swelling, possible need for multiple treatments, infection, granuloma, pain, dyspigmentation, numbness, weakness or tingling were explained to the patient. We discussed that multiple treatments may be required. The patient verbalized understanding. Photo consent and signed informed consent were obtained.Time-out was performed and patient denied history of severe allergy to bees.  Denies recent upcoming dental procedures or covid vaccine in the last 2 weeks.      The facial areas were cleansed with alcohol then chlorhexidine and injections were performed to bone on cheeks, for lower face cannula was used with 0.1cc of lido no epi at each marionette line followed by 25 cannula.  The patient tolerated the procedure well and there were no complications. Ice was provided post-procedure. The  patient was provided after care instructions and will follow-up  As needed    The patient will pay cosmetic fee today.       Staff:  Scribe/Staff    Scribe Disclosure:   I, Tyshawn Hayes, am serving as a scribe to document services personally performed by this physician, Dr. Candelaria Taylor, based on data collection and the provider's statements to me.     I did the entire procedure    Provider Disclosure:   The documentation recorded by the scribe accurately reflects the services I personally performed and the decisions made by me.    Candelaria Taylor MD    Department of Dermatology  [facility]: Phone: [phone], Fax:[phone]  [facility]: Phone: [phone], Fax: [phone]                Again, thank you for allowing me to participate in the care of your patient.        Sincerely,        Candealria Taylor MD

## 2022-05-25 NOTE — PROGRESS NOTES
I left voicemail for patient to call back and schedule in person skin exam with Dr. Taylor.    Beckie stuart Procedure   Dermatology, General and Plastic Surgery, Urology Specialties   [facility] [place] [phone]

## 2022-05-31 ENCOUNTER — TELEPHONE (OUTPATIENT)
Dept: DERMATOLOGY | Facility: CLINIC | Age: 42
End: 2022-05-31
Payer: COMMERCIAL

## 2022-05-31 ENCOUNTER — OFFICE VISIT (OUTPATIENT)
Dept: DERMATOLOGY | Facility: CLINIC | Age: 42
End: 2022-05-31
Payer: COMMERCIAL

## 2022-05-31 DIAGNOSIS — D23.9 DERMATOFIBROMA: ICD-10-CM

## 2022-05-31 DIAGNOSIS — L81.4 SOLAR LENTIGO: ICD-10-CM

## 2022-05-31 DIAGNOSIS — D22.9 MULTIPLE BENIGN NEVI: ICD-10-CM

## 2022-05-31 DIAGNOSIS — Z86.018 HISTORY OF DYSPLASTIC NEVUS: Primary | ICD-10-CM

## 2022-05-31 PROCEDURE — 99213 OFFICE O/P EST LOW 20 MIN: CPT | Performed by: DERMATOLOGY

## 2022-05-31 ASSESSMENT — PAIN SCALES - GENERAL: PAINLEVEL: NO PAIN (0)

## 2022-05-31 NOTE — PROGRESS NOTES
[facility] Dermatology Note  Encounter Date: May 31, 2022  Office Visit     Dermatology Problem List:  # Acne Vulgaris  - Current Rx: tretinoin 0.05% cream  # Atopic dermatitis, seasonal allergies  - Current Rx: cetirizine 10mg every day, frequent emollient use , Elidel  - Elidel: negative rxn.  # Keratosis pilaris  # Mildly dysplastic nevus, on the left knee biopsy 1/4/21  # Fhx melanoma (grandmother)    ____________________________________________    Assessment & Plan:    # Dermatofibromas, left lateral calf and right posterior thigh  - Reassured of benign nature, no treatment necessary    # Multiple benign nevi  # Solar lentigines  # H/o DN. No evidence of recurrent disease.  # Fhx melanoma.  - No concerning lesions today  - Monitor for ABCDEs of melanoma   - Continue sun protection - recommend SPF 30 or higher with frequent application   - Return sooner if noticing changing or symptomatic lesions     Procedures Performed:   None    Follow-up: 1 year(s) in-person, or earlier for new or changing lesions    Staff and Scribe:     Scribe Disclosure:  I, Jasson Bashir, am serving as a scribe to document services personally performed by Marianna Patel MD based on data collection and the provider's statements to me.     Provider Disclosure:   The documentation recorded by the scribe accurately reflects the services I personally performed and the decisions made by me.    Marianna Patel MD    Department of Dermatology  [facility]: Phone: [phone], Fax: [phone]  6/3/2022     ____________________________________________    CC: Skin Check (Katty is here today for a skin check. )    HPI:  Ms. Katty Mendoza is a(n) 41 year old female who presents today as a return patient for FBSE. Last seen in dermatology by Dr. Taylor on 5/24/2022, at which time patient was continued on tretinoin 0.05% cream  for treatment of acne vulgaris.    Today, patient denies new spots of concern on her skin. Reports family history (grandmother) of melanoma. Few blistering sunburns during teenage years.     Patient is otherwise feeling well, without additional skin concerns.    Labs Reviewed:  N/A    Physical Exam:  Vitals: There were no vitals taken for this visit.  SKIN: Total skin excluding the undergarment areas was performed. The exam included the head/face, neck, both arms, chest, back, abdomen, both legs, digits and/or nails.   - Multiple regular brown pigmented macules and papules are identified on the trunk and extremities.   - Scattered brown macules on sun exposed areas.  - There are firm tan/flesh colored papules that dimple with lateral pressure on the left lateral calf and right posterior thigh.  - No other lesions of concern on areas examined.     Medications:  Current Outpatient Medications   Medication     cetirizine (ZYRTEC) 10 MG tablet     hydrocortisone 2.5 % ointment     tretinoin (RETIN-A) 0.05 % external cream     cholecalciferol (VITAMIN D3) 1250 mcg (81002 units) capsule     No current facility-administered medications for this visit.      Past Medical History:   Patient Active Problem List   Diagnosis     Vitamin D deficiency     Past Medical History:   Diagnosis Date     Acne      Atopic dermatitis      Colon polyps      History of COVID-19      History of ovarian cyst      Seasonal allergic rhinitis         CC Referred Self, MD  No address on file on close of this encounter.

## 2022-05-31 NOTE — PATIENT INSTRUCTIONS
Patient Education     Checking for Skin Cancer  You can find cancer early by checking your skin each month. There are 3 kinds of skin cancer. They are melanoma, basal cell carcinoma, and squamous cell carcinoma. Doing monthly skin checks is the best way to find new marks or skin changes. Follow the instructions below for checking your skin.   The ABCDEs of checking moles for melanoma   Check your moles or growths for signs of melanoma using ABCDE:   Asymmetry: the sides of the mole or growth don t match  Border: the edges are ragged, notched, or blurred  Color: the color within the mole or growth varies  Diameter: the mole or growth is larger than 6 mm (size of a pencil eraser)  Evolving: the size, shape, or color of the mole or growth is changing (evolving is not shown in the images below)    Checking for other types of skin cancer  Basal cell carcinoma or squamous cell carcinoma have symptoms such as:     A spot or mole that looks different from all other marks on your skin  Changes in how an area feels, such as itching, tenderness, or pain  Changes in the skin's surface, such as oozing, bleeding, or scaliness  A sore that does not heal  New swelling or redness beyond the border of a mole    Who s at risk?  Anyone can get skin cancer. But you are at greater risk if you have:   Fair skin, light-colored hair, or light-colored eyes  Many moles or abnormal moles on your skin  A history of sunburns from sunlight or tanning beds  A family history of skin cancer  A history of exposure to radiation or chemicals  A weakened immune system  If you have had skin cancer in the past, you are at risk for recurring skin cancer.   How to check your skin  Do your monthly skin checkups in front of a full-length mirror. Check all parts of your body, including your:   Head (ears, face, neck, and scalp)  Torso (front, back, and sides)  Arms (tops, undersides, upper, and lower armpits)  Hands (palms, backs, and fingers, including  under the nails)  Buttocks and genitals  Legs (front, back, and sides)  Feet (tops, soles, toes, including under the nails, and between toes)  If you have a lot of moles, take digital photos of them each month. Make sure to take photos both up close and from a distance. These can help you see if any moles change over time.   Most skin changes are not cancer. But if you see any changes in your skin, call your doctor right away. Only he or she can diagnose a problem. If you have skin cancer, seeing your doctor can be the first step toward getting the treatment that could save your life.   [employer] last reviewed this educational content on 4/1/2019 2000-2020 [employer]. [address] [place] [address], [place] [address]. All rights reserved. This information is not intended as a substitute for professional medical care. Always follow your healthcare professional's instructions.       When should I call my doctor?  If you are worsening or not improving, please, contact us or seek urgent care as noted below.     Who should I call with questions (adults)?  [facility] (adult and pediatric): [phone]  [facility] (adult): [phone]  For urgent needs outside of business hours call the [facility] at [phone] and ask for the dermatology resident on call to be paged  If this is a medical emergency and you are unable to reach an ER, Call [phone]    Who should I call with questions (pediatric)?  [facility]- Pediatric Dermatology  Dr. Majo Wise, Dr. Juliana Bianchi, Dr. Rebecca Wild, ANTIONETTE Fay, Dr. Jaceklin Rivera, Dr. Yoana Beauchamp & Dr. Axel Yip  Non-urgent nurse triage line; [phone]- Kirsty and Vero PERAZA Care Coordinatorester Bertrand (/Complex ) [phone]    If you need a prescription refill, please contact your pharmacy. Refills are approved or denied by our  Physicians during normal business hours, Monday through Fridays  Per office policy, refills will not be granted if you have not been seen within the past year (or sooner depending on your child's condition)    Scheduling Information:  Pediatric Appointment Scheduling and Call Center [phone]  Radiology Scheduling- [phone]  Sedation Unit Scheduling- [phone]  [facility] Scheduling- General [phone]; Pediatric Dermatology [phone]  Main  Services: [phone]  French: [phone]  Japanese: [phone]  Hmong/Chadian/Kyrgyz: [phone]  Preadmission Nursing Department Fax Number: [phone] (Fax all pre-operative paperwork to this number)    For urgent matters arising during evenings, weekends, or holidays that cannot wait for normal business hours please call [phone] and ask for the dermatology resident on call to be paged.

## 2022-05-31 NOTE — NURSING NOTE
Dermatology Rooming Note    Katty Mendoza's goals for this visit include:   Chief Complaint   Patient presents with     Skin Check     Katty is here today for a skin check.      Sofia Chiang, Facilitator

## 2022-05-31 NOTE — TELEPHONE ENCOUNTER
[id number]- Left voicemail for patient to call back and schedule for an in person skin exam.    Beckie stuart Procedure   Dermatology, General and Plastic Surgery, Urology Specialties   [facility] [place] [phone]

## 2022-05-31 NOTE — LETTER
5/31/2022       RE: Katty Mendoza  [address]  [facility] [address]     Dear Colleague,    Thank you for referring your patient, Katty Mendoza, to the [facility] DERMATOLOGY [facility]IC [place] at [facility]. Please see a copy of my visit note below.    [facility] Dermatology Note  Encounter Date: May 31, 2022  Office Visit     Dermatology Problem List:  # Acne Vulgaris  - Current Rx: tretinoin 0.05% cream  # Atopic dermatitis, seasonal allergies  - Current Rx: cetirizine 10mg every day, frequent emollient use , Elidel  - Elidel: negative rxn.  # Keratosis pilaris  # Mildly dysplastic nevus, on the left knee biopsy 1/4/21  # Fhx melanoma (grandmother)    ____________________________________________    Assessment & Plan:    # Dermatofibromas, left lateral calf and right posterior thigh  - Reassured of benign nature, no treatment necessary    # Multiple benign nevi  # Solar lentigines  # H/o DN. No evidence of recurrent disease.  # Fhx melanoma.  - No concerning lesions today  - Monitor for ABCDEs of melanoma   - Continue sun protection - recommend SPF 30 or higher with frequent application   - Return sooner if noticing changing or symptomatic lesions     Procedures Performed:   None    Follow-up: 1 year(s) in-person, or earlier for new or changing lesions    Staff and Scribe:     Scribe Disclosure:  I, Jasson Bashir, am serving as a scribe to document services personally performed by Marianna Patel MD based on data collection and the provider's statements to me.     Provider Disclosure:   The documentation recorded by the scribe accurately reflects the services I personally performed and the decisions made by me.    Marianna Patel MD    Department of Dermatology  [facility]: Phone: [phone], Fax: [phone]  6/3/2022      ____________________________________________    CC: Skin Check (Katty is here today for a skin check. )    HPI:  Ms. Katty Mendoza is a(n) 41 year old female who presents today as a return patient for FBSE. Last seen in dermatology by Dr. Taylor on 5/24/2022, at which time patient was continued on tretinoin 0.05% cream for treatment of acne vulgaris.    Today, patient denies new spots of concern on her skin. Reports family history (grandmother) of melanoma. Few blistering sunburns during teenage years.     Patient is otherwise feeling well, without additional skin concerns.    Labs Reviewed:  N/A    Physical Exam:  Vitals: There were no vitals taken for this visit.  SKIN: Total skin excluding the undergarment areas was performed. The exam included the head/face, neck, both arms, chest, back, abdomen, both legs, digits and/or nails.   - Multiple regular brown pigmented macules and papules are identified on the trunk and extremities.   - Scattered brown macules on sun exposed areas.  - There are firm tan/flesh colored papules that dimple with lateral pressure on the left lateral calf and right posterior thigh.  - No other lesions of concern on areas examined.     Medications:  Current Outpatient Medications   Medication     cetirizine (ZYRTEC) 10 MG tablet     hydrocortisone 2.5 % ointment     tretinoin (RETIN-A) 0.05 % external cream     cholecalciferol (VITAMIN D3) 1250 mcg (22453 units) capsule     No current facility-administered medications for this visit.      Past Medical History:   Patient Active Problem List   Diagnosis     Vitamin D deficiency     Past Medical History:   Diagnosis Date     Acne      Atopic dermatitis      Colon polyps      History of COVID-19      History of ovarian cyst      Seasonal allergic rhinitis         CC Referred Self, MD  No address on file on close of this encounter.      Again, thank you for allowing me to participate in the care of your patient.      Sincerely,    Marianna  MD Jorge

## 2022-05-31 NOTE — LETTER
Date:Maida 3, 2022      Patient was self referred, no letter generated. Do not send.        [facility] Health Information

## 2022-06-06 ENCOUNTER — ANCILLARY PROCEDURE (OUTPATIENT)
Dept: MAMMOGRAPHY | Facility: CLINIC | Age: 42
End: 2022-06-06
Payer: COMMERCIAL

## 2022-06-06 DIAGNOSIS — Z12.31 VISIT FOR SCREENING MAMMOGRAM: ICD-10-CM

## 2022-06-06 PROCEDURE — 77063 BREAST TOMOSYNTHESIS BI: CPT | Mod: GC

## 2022-06-06 PROCEDURE — 77067 SCR MAMMO BI INCL CAD: CPT | Mod: GC

## 2022-08-20 ENCOUNTER — HEALTH MAINTENANCE LETTER (OUTPATIENT)
Age: 42
End: 2022-08-20

## 2023-01-31 ENCOUNTER — TELEPHONE (OUTPATIENT)
Dept: DERMATOLOGY | Facility: CLINIC | Age: 43
End: 2023-01-31

## 2023-01-31 NOTE — TELEPHONE ENCOUNTER
Spoke with patient about scheduling her for her 1 year follow up with Dr. Patel. Patient stated that she will schedule her follow up on her own time.

## 2023-02-06 NOTE — PROGRESS NOTES
7450  Patient arrived to [address] [place] [address] Way alert and oriented x 4 with spouse. Patient is neurologically intact. PIV established without difficulty. Initial vitals WNL, and patient denies pain. Shelby Miles RN      Gamma Knife RN Pre-Procedure Checklist     1. Has the patient ever had any surgery on the head or neck?    -Yes, PSR 8/23/22    -If yes, is the surgery site marked? N/A    2. Has the patient ever received radiation treatment to the head or neck? No      -If yes, is the treatment summary and/or disk of treatment plan available? N/A      -If the patient has had previous Gamma Knife radiosurgery has the most recent imaging been reviewed in the Gamma Plan? N/A      3. Has the patient received chemotherapy or immunotherapy in the past month? No      -If yes, list the agent and date of last treatment. N/A    4. . List the procedure-specific medications taken by the patient this morning:   -Pregabalin 300 mg   -Trileptal 300 mg      5. What is the patients GFR? 95    -For GFR 0-30 => no contrast at MRI    -For GFR 31-59 => single dose contrast at MRI    -For GFR >60 => double dose contrast at MRI    6. Does the patient require a pregnancy test per [address]?  N/A     -If yes, the result is: na    Shelby Miles RN [facility] Dermatology Note      Dermatology Problem List:  1.Acne Vulgaris  2. Atopic dermatitis  3. Keratosis pilaris    CC:   Chief Complaint   Patient presents with     Derm Problem     Katty is here for a skin check and to discuss acne.  States no concerning areas for her skin check.          Encounter Date: Nov 20, 2018    History of Present Illness:  Ms. Katty Mendoza is a 37 year old female who presents for evaluation of her acne, eyelid dermatitis, and a skin check.  She saw Dr Taylor and was prescribed tretinoin but it was too irritating.  She responded well to Differin cream in the past but Differin gel OTC was too irritating as well. She has an ongoing right eyelid dermatitis for which she has seen her eye doctor and had tried drops.  She has concerns about chronically using cortisone cream in this area.     Past Medical History:   Patient Active Problem List   Diagnosis     Vitamin D deficiency     Past Medical History:   Diagnosis Date     Anxiety      Past Surgical History:   Procedure Laterality Date     BREAST BIOPSY, RT/LT Right 2008    times 2; fibroadenoma     BREAST SURGERY  2007,2013     GYN SURGERY  2002     OOPHORECTOMY Right 2002    complete Dr. Villalta     OOPHORECTOMY Left 2002    partial left  Dr. Villalta       Social History:  Patient  reports that she has never smoked. She has never used smokeless tobacco. She reports that she drinks about 0.5 - 1.0 oz of alcohol per week  She reports that she does not use illicit drugs.    Family History:  Family History   Problem Relation Age of Onset     Cancer Paternal Grandmother      melanoma     Melanoma Paternal Grandmother      Lipids Father      Osteoporosis Maternal Grandmother      Cerebrovascular Disease Maternal Grandfather      Asthma No family hx of      C.A.D. No family hx of      Colon Cancer No family hx of      Inflammatory Bowel Disease No family hx of        Medications:  Current Outpatient Prescriptions    Medication Sig Dispense Refill     adapalene (DIFFERIN) 0.1 % cream At bedtime 45 g 11     tacrolimus (PROTOPIC) 0.1 % ointment Use twice daily for eyelid rash 60 g 11     cholecalciferol (VITAMIN  -D) 1000 UNITS capsule Take 4 capsules (4,000 Units) by mouth daily Take one capsule daily. (Patient not taking: Reported on 11/20/2018) 360 capsule 1     Omega-3 Fatty Acids (OMEGA-3 FISH OIL PO)        tretinoin (RETIN-A) 0.025 % cream Apply a pea sized amount nightly (Patient not taking: Reported on 11/20/2018) 45 g 2     No Known Allergies          Physical exam:  Vitals: There were no vitals taken for this visit.  GEN: This is a well developed, well-nourished female in no acute distress, in a pleasant mood.    SKIN: Total skin excluding the undergarment areas was performed. The exam included the head/face, neck, both arms, chest, back, abdomen, both legs, digits and/or nails.   -There are superifical acneiform papules with intermixed open and closed comedones on the cheeks and chin.   -Multiple regular brown pigmented macules and papules are identified on the exam.  No atypia on dermoscopy.   -There are pink scaly patches and plaques on the right inner canthus.  -No other lesions of concern on areas examined.     Impression/Plan:  1. Multiple clinically benign nevi on the exam today.    No atypia      2. Acne vulgaris    Differin cream at bedtime      3. Eczematous dermatitis    Protopic 0.1% ointment bid    Discussed patch testing    Follow-up in 1 year, earlier for new or changing lesions.       Staff Involved:  Staff Only

## 2023-03-23 ENCOUNTER — PATIENT OUTREACH (OUTPATIENT)
Dept: GASTROENTEROLOGY | Facility: CLINIC | Age: 43
End: 2023-03-23

## 2023-03-23 ENCOUNTER — PREP FOR PROCEDURE (OUTPATIENT)
Dept: GASTROENTEROLOGY | Facility: CLINIC | Age: 43
End: 2023-03-23

## 2023-03-23 DIAGNOSIS — D12.1 ADENOMA OF APPENDIX: Primary | ICD-10-CM

## 2023-03-23 NOTE — PROGRESS NOTES
Pt called to arrange follow up colonoscopy    Procedure/Imaging/Clinic: Colonscopy   Physician: Roge   Timin-2 years from prior procedure on 3/24/22  Procedure length: per MD average  Anesthesia: MAC   Dx: Appendiceal orifice adenomas, 1 year surveillance   Tier: 3   Location: [facility] or [facility]    Pt would like procedure date next available at either [facility] or [facility]. [facility] on  or [facility] on  as potential dates. Will send [employer] message with date of procedure once determined by OR     Explained they will need a , someone to stay with them for 24 hours and should stay in town for 24 hours (within 45 min of Hospital) post procedure     Patient needs to get pre-op physical completed. If outside  health system will need physical faxed to number [phone]   If you do not get a preop physical, your procedure could be cancelled, patient voiced understanding*     Preop Plan: PAC appt to be made once date of procedure is determined     Med Review     Blood thinner -  none   ASA - none  Diabetic - none     Patient Education r/t procedure: mychart     A pre-op nurse will call 1-2 days prior to the procedure.      NPO/Prep: Goltyly bowel prep is patient's preference - sent to pharmacy of pt's choosing, has dulcolax at home so did not need that filled     Verbalized understanding of all instructions. All questions answered.      Message sent to OR      Lissy Mike, RN, BSN,   [facility]  Care coordinator

## 2023-03-28 ENCOUNTER — HOSPITAL ENCOUNTER (OUTPATIENT)
Facility: CLINIC | Age: 43
End: 2023-03-28
Attending: INTERNAL MEDICINE | Admitting: INTERNAL MEDICINE
Payer: COMMERCIAL

## 2023-04-11 NOTE — NURSING NOTE
Chief Complaint   Patient presents with     Physical     Annual exam   Skylar Landry LPN  
actual/stated

## 2023-04-18 ENCOUNTER — DOCUMENTATION ONLY (OUTPATIENT)
Dept: GASTROENTEROLOGY | Facility: CLINIC | Age: 43
End: 2023-04-18

## 2023-04-18 NOTE — PROGRESS NOTES
Called PT to offer earlier procedure date. Dr. Guan has availability on 5-11 at the [facility]. Left .    SK

## 2023-05-17 RX ORDER — ONDANSETRON 2 MG/ML
4 INJECTION INTRAMUSCULAR; INTRAVENOUS
Status: CANCELLED | OUTPATIENT
Start: 2023-05-17

## 2023-05-17 RX ORDER — LIDOCAINE 40 MG/G
CREAM TOPICAL
Status: CANCELLED | OUTPATIENT
Start: 2023-05-17

## 2023-05-19 ENCOUNTER — OFFICE VISIT (OUTPATIENT)
Dept: OBGYN | Facility: CLINIC | Age: 43
End: 2023-05-19
Attending: OBSTETRICS & GYNECOLOGY
Payer: COMMERCIAL

## 2023-05-19 ENCOUNTER — APPOINTMENT (OUTPATIENT)
Dept: LAB | Facility: CLINIC | Age: 43
End: 2023-05-19
Attending: OBSTETRICS & GYNECOLOGY
Payer: COMMERCIAL

## 2023-05-19 ENCOUNTER — ANCILLARY PROCEDURE (OUTPATIENT)
Dept: MAMMOGRAPHY | Facility: CLINIC | Age: 43
End: 2023-05-19
Payer: COMMERCIAL

## 2023-05-19 VITALS
DIASTOLIC BLOOD PRESSURE: 77 MMHG | SYSTOLIC BLOOD PRESSURE: 119 MMHG | BODY MASS INDEX: 32.87 KG/M2 | WEIGHT: 229.6 LBS | HEIGHT: 70 IN | HEART RATE: 78 BPM

## 2023-05-19 DIAGNOSIS — Z01.419 ENCOUNTER FOR GYNECOLOGICAL EXAMINATION WITHOUT ABNORMAL FINDING: Primary | ICD-10-CM

## 2023-05-19 DIAGNOSIS — E55.9 HYPOVITAMINOSIS D: ICD-10-CM

## 2023-05-19 DIAGNOSIS — Z13.29 SCREENING FOR THYROID DISORDER: ICD-10-CM

## 2023-05-19 DIAGNOSIS — Z12.31 VISIT FOR SCREENING MAMMOGRAM: ICD-10-CM

## 2023-05-19 DIAGNOSIS — Z12.4 SCREENING FOR MALIGNANT NEOPLASM OF CERVIX: ICD-10-CM

## 2023-05-19 DIAGNOSIS — Z13.1 SCREENING FOR DIABETES MELLITUS: ICD-10-CM

## 2023-05-19 DIAGNOSIS — R53.83 OTHER FATIGUE: ICD-10-CM

## 2023-05-19 DIAGNOSIS — Z13.220 LIPID SCREENING: ICD-10-CM

## 2023-05-19 LAB
CHOLEST SERPL-MCNC: 152 MG/DL
DEPRECATED CALCIDIOL+CALCIFEROL SERPL-MC: 20 UG/L (ref 20–75)
HBA1C MFR BLD: 5.8 %
HDLC SERPL-MCNC: 67 MG/DL
LDLC SERPL CALC-MCNC: 76 MG/DL
NONHDLC SERPL-MCNC: 85 MG/DL
TRIGL SERPL-MCNC: 47 MG/DL
TSH SERPL DL<=0.005 MIU/L-ACNC: 1.82 UIU/ML (ref 0.3–4.2)

## 2023-05-19 PROCEDURE — 83036 HEMOGLOBIN GLYCOSYLATED A1C: CPT | Performed by: OBSTETRICS & GYNECOLOGY

## 2023-05-19 PROCEDURE — 80061 LIPID PANEL: CPT | Performed by: OBSTETRICS & GYNECOLOGY

## 2023-05-19 PROCEDURE — G0145 SCR C/V CYTO,THINLAYER,RESCR: HCPCS | Performed by: OBSTETRICS & GYNECOLOGY

## 2023-05-19 PROCEDURE — G0101 CA SCREEN;PELVIC/BREAST EXAM: HCPCS | Performed by: OBSTETRICS & GYNECOLOGY

## 2023-05-19 PROCEDURE — 77067 SCR MAMMO BI INCL CAD: CPT | Mod: GC | Performed by: RADIOLOGY

## 2023-05-19 PROCEDURE — 77063 BREAST TOMOSYNTHESIS BI: CPT | Mod: GC | Performed by: RADIOLOGY

## 2023-05-19 PROCEDURE — 87624 HPV HI-RISK TYP POOLED RSLT: CPT | Performed by: OBSTETRICS & GYNECOLOGY

## 2023-05-19 PROCEDURE — 84443 ASSAY THYROID STIM HORMONE: CPT | Performed by: OBSTETRICS & GYNECOLOGY

## 2023-05-19 PROCEDURE — 36415 COLL VENOUS BLD VENIPUNCTURE: CPT | Performed by: OBSTETRICS & GYNECOLOGY

## 2023-05-19 PROCEDURE — 82306 VITAMIN D 25 HYDROXY: CPT | Performed by: OBSTETRICS & GYNECOLOGY

## 2023-05-19 NOTE — PROGRESS NOTES
Progress Note    SUBJECTIVE:  Katty Mendoza is an 42 year old  , who requests a breast and pelvic exam.    Patient is followed by outside provider for primary care.    Concerns today include: cycles are getting a bit shorter (25 days), declines contraception.  Concerned about impending perimenopause, but no hot flashes/night sweats.  Is more fatigued lately, but not exercising as regularly.   Desires annual paps due to history of HPV, is willing to pay if not covered.  Requests cholesterol and vitamin D labs today.    Menstrual History:      2019     5:34 AM 2019     7:45 AM 2020     9:00 AM 3/21/2022     3:15 PM 2023     8:00 AM   Menstrual History   LAST MENSTRUAL PERIOD 2019 2019 2020 3/1/2022 2023       Last    Lab Results   Component Value Date    PAP NIL 2021    PAP NIL 2020     History of abnormal Pap smear: HPV-other HR positive in  and     Last   Lab Results   Component Value Date    HPV16 Negative 2021    HPV16 Negative 2020     Last   Lab Results   Component Value Date    HPV18 Negative 2021    HPV18 Negative 2020     Last   Lab Results   Component Value Date    HRHPV Negative 2021    HRHPV Negative 2020       Mammogram current: yes, and has exam scheduled today.    HISTORY:  Prescription Medications as of 2023       Rx Number Disp Refills Start End Last Dispensed Date Next Fill Date Owning Pharmacy    cetirizine (ZYRTEC) 10 MG tablet  90 tablet 3 2021    [facility] #[id number] [address]    Sig: Take 1 tablet (10 mg) by mouth daily    Class: E-Prescribe    Route: Oral    cholecalciferol (VITAMIN D3) 1250 mcg (96546 units) capsule    2021        Sig: TAKE ONE Capsule BY MOUTH ONCE WEEKLY FOR EIGHT WEEKS.    Class: Historical    hydrocortisone 2.5 % ointment  15 g 0 2022    [facility] #[id number] [address]    Sig:  Apply twice daily for 1-2 weeks to the rash under eyes  Then stop. Stop early if this resolved    Class: E-Prescribe    tretinoin (RETIN-A) 0.05 % external cream  45 g 11 2022    [facility] #[id number] [address]    Sig: Apply topically At Bedtime Use pea sized amount. Hold if you become pregnant. Skip eyelids    Class: E-Prescribe    Route: Topical        No Known Allergies  Immunization History   Administered Date(s) Administered     COVID-19 Bivalent 12+ (Pfizer) 2022     COVID-19 MONOVALENT 12+ (Pfizer) 2021, 2021, 2021     HPV 2007, 2007, 10/23/2008     HPV Quadrivalent 2019, 01/10/2020     HPV9 2020     HepB 2004, 2005     Influenza (IIV3) PF 2005, 2005, 10/23/2008     TD,PF 7+ (Tenivac) 1996, 2002, 2005     TDAP (Adacel,Boostrix) 2009     TDAP Vaccine (Adacel) 2021       OB History    Para Term  AB Living   0 0 0 0 0 0   SAB IAB Ectopic Multiple Live Births   0 0 0 0 0     Past Medical History:   Diagnosis Date     Acne      Atopic dermatitis      Colon polyps      History of COVID-19      History of ovarian cyst      Seasonal allergic rhinitis      Past Surgical History:   Procedure Laterality Date     APPENDECTOMY  2002    Dr. Villalta     BREAST BIOPSY, RT/LT Right 2008    times 2; fibroadenoma     BREAST SURGERY  ,     COLONOSCOPY N/A 2019    Procedure: COMBINED COLONOSCOPY, SINGLE OR MULTIPLE BIOPSY/POLYPECTOMY BY BIOPSY;  Surgeon: Juan Miguel Guan MD;  [facility]     COLONOSCOPY N/A 2020    Procedure: COLONOSCOPY, WITH POLYPECTOMY AND BIOPSY;  Surgeon: Juan Miguel Guan MD;  Location: [facility] OR     COLONOSCOPY N/A 2021    Procedure: COLONOSCOPY, WITH POLYPECTOMY AND BIOPSY;  Surgeon: Juan Miguel Guan MD;  [facility]     COLONOSCOPY N/A 3/24/2022    Procedure: COLONOSCOPY, FLEXIBLE, WITH LESION REMOVAL USING SNARE;   Surgeon: Juan Miguel Guan MD;  Location:  GI     left adnexal cystectomy Left 05/07/2002    Dr. Villalta     SALPINGO OOPHORECTOMY,R/L/BOBBY Right 2002    complete Dr. Villalta     Family History   Problem Relation Age of Onset     Lipids Father      Osteoporosis Maternal Grandmother      Cerebrovascular Disease Maternal Grandfather      Cancer Paternal Grandmother         melanoma     Melanoma Paternal Grandmother      Asthma No family hx of      C.A.D. No family hx of      Colon Cancer No family hx of      Inflammatory Bowel Disease No family hx of      Anesthesia Reaction No family hx of      Bleeding Disorder No family hx of      Clotting Disorder No family hx of      Social History     Socioeconomic History     Marital status: Single     Spouse name: None     Number of children: None     Years of education: None     Highest education level: None   Occupational History     Occupation: 7/2021 starting with [employer].  working remotely  in [place]   Tobacco Use     Smoking status: Never     Smokeless tobacco: Never   Substance and Sexual Activity     Alcohol use: Yes     Alcohol/week: 0.8 - 1.7 standard drinks of alcohol     Comment: occas      Drug use: No     Sexual activity: Not Currently     Partners: Male     Birth control/protection: Condom, None   Other Topics Concern      Service No     Blood Transfusions No     Caffeine Concern No     Occupational Exposure No     Hobby Hazards No     Sleep Concern No     Stress Concern Yes     Comment: needs alone time     Weight Concern No     Special Diet No     Back Care No     Exercise Yes     Bike Helmet Yes     Seat Belt Yes     Self-Exams Yes   Social History Narrative    How much exercise per week? Runs 3-4 times per week    How much calcium per day? Some, milk and yogurt       How much caffeine per day? 1-2 cups per day    How much vitamin D per day? None    Do you/your family wear seatbelts?  Yes    Do you/your family use safety helmets? Yes    Do  "you/your family use sunscreen? Yes    Do you/your family keep firearms in the home? No    Do you/your family have a smoke detector(s)? Yes        Do you feel safe in your home? Yes    Has anyone ever touched you in an unwanted manner? No     Explain          Maida 10, 2014 Estela Rodriguez BETHANIE                    5/19/2023    Working from home, moved to [facility] in [place], has a puppy ([employer] best/poodle mix), Would like to get back to spin class for exercise.        Not currently dating/sexually active               ROS    EXAM:  Blood pressure 119/77, pulse 78, height 1.778 m (5' 10\"), weight 104.1 kg (229 lb 9.6 oz), last menstrual period 05/18/2023, not currently breastfeeding. Body mass index is 32.94 kg/m .  General appearance: Pleasant female in no acute distress.     BREAST EXAM:  Breast: Without visible skin changes. No dimpling or lesions seen.   Breasts supple, non-tender with palpation, no dominant mass, nodularity, or nipple discharge noted bilaterally. Axillary nodes negative.      PELVIC EXAM:  EG/BUS: Normal genital architecture without lesions, erythema or abnormal secretions Bartholin's, Urethra, Combined Locks's normal   Urethral meatus: normal    Urethra: no masses, tenderness, or scarring    Bladder: no masses or tenderness    Vagina: moist, pink, rugae with creamy, white, odorless and blood tinged  secretions  Cervix: no lesions and pink, moist, closed, without lesion or CMT, menstrual blood present  Uterus: retroverted,  and small, smooth, firm, mobile w/o pain  Adnexa: Within normal limits and No masses, nodularity, tenderness  Rectum:anus normal, rectal/vaginal exam confirms above findings       ASSESSMENT:  Encounter Diagnoses   Name Primary?     Encounter for gynecological examination without abnormal finding Yes     Hypovitaminosis D      Lipid screening      Screening for diabetes mellitus      Screening for thyroid disorder      Other fatigue      Screening for malignant neoplasm of " cervix       42 year old Female Pelvic and Breast Exam  History of HPV, repeat pap preformed.  Screening labs ordered.    PLAN:   Orders Placed This Encounter   Procedures     Pelvic and Breast Exam Procedure []     Pap Smear Exam []     25- OH-Vitamin D     Lipid Profile     Hemoglobin A1c     TSH with free T4 reflex     Encouraged regular physical activity.  Mammogram today  Return in one year/PRN for preventive care or problems/concerns.     Verbalized understanding and agreement with visit plan.    Catrina Edge MD

## 2023-05-19 NOTE — PATIENT INSTRUCTIONS
Thank you for trusting us with your care!     If you need to contact us for questions about:  Symptoms, Scheduling & Medical Questions; Non-urgent (2-3 day response) Dilcia message, Urgent (needing response today) [phone] (if after 3:30pm next day response)   Prescriptions: Please call your Pharmacy   Billing: [facility] [phone] or [facility]:[phone]

## 2023-05-19 NOTE — LETTER
2023       RE: Katty Mendoza  [address] [address]     Dear Colleague,    Thank you for referring your patient, Katty Mendoza, to the [facility] [place] [facility]. Please see a copy of my visit note below.      Progress Note    SUBJECTIVE:  Katty Mendoza is an 42 year old  , who requests a breast and pelvic exam.    Patient is followed by outside provider for primary care.    Concerns today include: cycles are getting a bit shorter (25 days), declines contraception.  Concerned about impending perimenopause, but no hot flashes/night sweats.  Is more fatigued lately, but not exercising as regularly.   Desires annual paps due to history of HPV, is willing to pay if not covered.  Requests cholesterol and vitamin D labs today.    Menstrual History:      2019     5:34 AM 2019     7:45 AM 2020     9:00 AM 3/21/2022     3:15 PM 2023     8:00 AM   Menstrual History   LAST MENSTRUAL PERIOD 2019 2019 2020 3/1/2022 2023       Last    Lab Results   Component Value Date    PAP NIL 2021    PAP NIL 2020     History of abnormal Pap smear: HPV-other HR positive in  and     Last   Lab Results   Component Value Date    HPV16 Negative 2021    HPV16 Negative 2020     Last   Lab Results   Component Value Date    HPV18 Negative 2021    HPV18 Negative 2020     Last   Lab Results   Component Value Date    HRHPV Negative 2021    HRHPV Negative 2020       Mammogram current: yes, and has exam scheduled today.    HISTORY:  Prescription Medications as of 2023         Rx Number Disp Refills Start End Last Dispensed Date Next Fill Date Owning Pharmacy    cetirizine (ZYRTEC) 10 MG tablet  90 tablet 3 2021    [facility] #[id number] - [address]    Sig: Take 1 tablet (10 mg) by mouth daily    Class: E-Prescribe     Route: Oral    cholecalciferol (VITAMIN D3) 1250 mcg (85901 units) capsule    2021        Sig: TAKE ONE Capsule BY MOUTH ONCE WEEKLY FOR EIGHT WEEKS.    Class: Historical    hydrocortisone 2.5 % ointment  15 g 0 2022    [facility] #[id number] [facility] [address] [place] [address]    Sig: Apply twice daily for 1-2 weeks to the rash under eyes  Then stop. Stop early if this resolved    Class: E-Prescribe    tretinoin (RETIN-A) 0.05 % external cream  45 g 11 2022    [facility] #[id number] [place], [place] - [address] [place] [address]    Sig: Apply topically At Bedtime Use pea sized amount. Hold if you become pregnant. Skip eyelids    Class: E-Prescribe    Route: Topical          No Known Allergies  Immunization History   Administered Date(s) Administered    COVID-19 Bivalent 12+ (Pfizer) 2022    COVID-19 MONOVALENT 12+ (Pfizer) 2021, 2021, 2021    HPV 2007, 2007, 10/23/2008    HPV Quadrivalent 2019, 01/10/2020    HPV9 2020    HepB 2004, 2005    Influenza (IIV3) PF 2005, 2005, 10/23/2008    TD,PF 7+ (Tenivac) 1996, 2002, 2005    TDAP (Adacel,Boostrix) 2009    TDAP Vaccine (Adacel) 2021       OB History    Para Term  AB Living   0 0 0 0 0 0   SAB IAB Ectopic Multiple Live Births   0 0 0 0 0     Past Medical History:   Diagnosis Date    Acne     Atopic dermatitis     Colon polyps     History of COVID-19     History of ovarian cyst     Seasonal allergic rhinitis      Past Surgical History:   Procedure Laterality Date    APPENDECTOMY  2002    Dr. Villalta    BREAST BIOPSY, RT/LT Right 2008    times 2; fibroadenoma    BREAST SURGERY  ,    COLONOSCOPY N/A 2019    Procedure: COMBINED COLONOSCOPY, SINGLE OR MULTIPLE BIOPSY/POLYPECTOMY BY BIOPSY;  Surgeon: Juan Miguel Guan MD;  [facility]    COLONOSCOPY N/A 2020    Procedure: COLONOSCOPY, WITH POLYPECTOMY AND BIOPSY;   Surgeon: Juan Miguel Guan MD;  [facility]    COLONOSCOPY N/A 1/25/2021    Procedure: COLONOSCOPY, WITH POLYPECTOMY AND BIOPSY;  Surgeon: Juan Miguel Guan MD;  [facility]    COLONOSCOPY N/A 3/24/2022    Procedure: COLONOSCOPY, FLEXIBLE, WITH LESION REMOVAL USING SNARE;  Surgeon: Juan Miguel Guan MD;  [facility]    left adnexal cystectomy Left 05/07/2002    Dr. Villalta    SALPINGO OOPHORECTOMY,R/L/BOBBY Right 2002    complete Dr. Villalta     Family History   Problem Relation Age of Onset    Lipids Father     Osteoporosis Maternal Grandmother     Cerebrovascular Disease Maternal Grandfather     Cancer Paternal Grandmother         melanoma    Melanoma Paternal Grandmother     Asthma No family hx of     C.A.D. No family hx of     Colon Cancer No family hx of     Inflammatory Bowel Disease No family hx of     Anesthesia Reaction No family hx of     Bleeding Disorder No family hx of     Clotting Disorder No family hx of      Social History     Socioeconomic History    Marital status: Single     Spouse name: None    Number of children: None    Years of education: None    Highest education level: None   Occupational History    Occupation: 7/2021 starting with [employer].  working remotely  in [place]   Tobacco Use    Smoking status: Never    Smokeless tobacco: Never   Substance and Sexual Activity    Alcohol use: Yes     Alcohol/week: 0.8 - 1.7 standard drinks of alcohol     Comment: occas     Drug use: No    Sexual activity: Not Currently     Partners: Male     Birth control/protection: Condom, None   Other Topics Concern     Service No    Blood Transfusions No    Caffeine Concern No    Occupational Exposure No    Hobby Hazards No    Sleep Concern No    Stress Concern Yes     Comment: needs alone time    Weight Concern No    Special Diet No    Back Care No    Exercise Yes    Bike Helmet Yes    Seat Belt Yes    Self-Exams Yes   Social History Narrative    How much exercise per week? Runs 3-4 times per  "week    How much calcium per day? Some, milk and yogurt       How much caffeine per day? 1-2 cups per day    How much vitamin D per day? None    Do you/your family wear seatbelts?  Yes    Do you/your family use safety helmets? Yes    Do you/your family use sunscreen? Yes    Do you/your family keep firearms in the home? No    Do you/your family have a smoke detector(s)? Yes        Do you feel safe in your home? Yes    Has anyone ever touched you in an unwanted manner? No     Explain          Maida 10, 2014 Estela Rodriguez BETHANIE                    5/19/2023    Working from home, moved to [facility] in [place], has a puppy (Austrailian bset/poodle mix), Would like to get back to spin class for exercise.        Not currently dating/sexually active               ROS    EXAM:  Blood pressure 119/77, pulse 78, height 1.778 m (5' 10\"), weight 104.1 kg (229 lb 9.6 oz), last menstrual period 05/18/2023, not currently breastfeeding. Body mass index is 32.94 kg/m .  General appearance: Pleasant female in no acute distress.     BREAST EXAM:  Breast: Without visible skin changes. No dimpling or lesions seen.   Breasts supple, non-tender with palpation, no dominant mass, nodularity, or nipple discharge noted bilaterally. Axillary nodes negative.      PELVIC EXAM:  EG/BUS: Normal genital architecture without lesions, erythema or abnormal secretions Bartholin's, Urethra, Macungie's normal   Urethral meatus: normal    Urethra: no masses, tenderness, or scarring    Bladder: no masses or tenderness    Vagina: moist, pink, rugae with creamy, white, odorless and blood tinged  secretions  Cervix: no lesions and pink, moist, closed, without lesion or CMT, menstrual blood present  Uterus: retroverted,  and small, smooth, firm, mobile w/o pain  Adnexa: Within normal limits and No masses, nodularity, tenderness  Rectum:anus normal, rectal/vaginal exam confirms above findings       ASSESSMENT:  Encounter Diagnoses   Name Primary?    Encounter " for gynecological examination without abnormal finding Yes    Hypovitaminosis D     Lipid screening     Screening for diabetes mellitus     Screening for thyroid disorder     Other fatigue     Screening for malignant neoplasm of cervix       42 year old Female Pelvic and Breast Exam  History of HPV, repeat pap preformed.  Screening labs ordered.    PLAN:   Orders Placed This Encounter   Procedures    Pelvic and Breast Exam Procedure []    Pap Smear Exam []    25- OH-Vitamin D    Lipid Profile    Hemoglobin A1c    TSH with free T4 reflex     Encouraged regular physical activity.  Mammogram today  Return in one year/PRN for preventive care or problems/concerns.     Verbalized understanding and agreement with visit plan.    Catrina Edge MD

## 2023-05-24 LAB
BKR LAB AP GYN ADEQUACY: NORMAL
BKR LAB AP GYN INTERPRETATION: NORMAL
BKR LAB AP HPV REFLEX: NORMAL
BKR LAB AP LMP: NORMAL
BKR LAB AP PREVIOUS ABNL DX: NORMAL
BKR LAB AP PREVIOUS ABNORMAL: NORMAL
PATH REPORT.COMMENTS IMP SPEC: NORMAL
PATH REPORT.COMMENTS IMP SPEC: NORMAL
PATH REPORT.RELEVANT HX SPEC: NORMAL

## 2023-08-31 ENCOUNTER — TELEPHONE (OUTPATIENT)
Dept: DERMATOLOGY | Facility: CLINIC | Age: 43
End: 2023-08-31
Payer: COMMERCIAL

## 2023-09-16 ENCOUNTER — HEALTH MAINTENANCE LETTER (OUTPATIENT)
Age: 43
End: 2023-09-16

## 2024-10-16 ENCOUNTER — MYC MEDICAL ADVICE (OUTPATIENT)
Dept: DERMATOLOGY | Facility: CLINIC | Age: 44
End: 2024-10-16
Payer: COMMERCIAL

## 2024-10-16 ENCOUNTER — TELEPHONE (OUTPATIENT)
Dept: DERMATOLOGY | Facility: CLINIC | Age: 44
End: 2024-10-16
Payer: COMMERCIAL

## 2024-10-16 NOTE — TELEPHONE ENCOUNTER
[facility] Call Center    Phone Message    May a detailed message be left on voicemail: yes refers MyChart    Reason for Call: Other: Pt would like more cheek fillers with Dr. Taylor if possible but is aware that it may not be possible before she leaves. Please call.      Action Taken: Other: [facility]    Travel Screening: Not Applicable

## 2024-11-09 ENCOUNTER — HEALTH MAINTENANCE LETTER (OUTPATIENT)
Age: 44
End: 2024-11-09

## 2025-01-24 ENCOUNTER — PREP FOR PROCEDURE (OUTPATIENT)
Dept: GASTROENTEROLOGY | Facility: CLINIC | Age: 45
End: 2025-01-24
Payer: COMMERCIAL

## 2025-01-24 DIAGNOSIS — Z85.038 ENCOUNTER FOR FOLLOW-UP SURVEILLANCE OF COLON CANCER: ICD-10-CM

## 2025-01-24 DIAGNOSIS — Z08 ENCOUNTER FOR FOLLOW-UP SURVEILLANCE OF COLON CANCER: ICD-10-CM

## 2025-01-24 DIAGNOSIS — D12.1 ADENOMA OF APPENDIX: Primary | ICD-10-CM

## 2025-01-27 DIAGNOSIS — Z12.11 ENCOUNTER FOR COLONOSCOPY DUE TO HISTORY OF COLONIC POLYP: Primary | ICD-10-CM

## 2025-01-27 DIAGNOSIS — Z86.0100 ENCOUNTER FOR COLONOSCOPY DUE TO HISTORY OF COLONIC POLYP: Primary | ICD-10-CM

## 2025-01-27 RX ORDER — BISACODYL 5 MG/1
TABLET, DELAYED RELEASE ORAL
Qty: 4 TABLET | Refills: 0 | Status: SHIPPED | OUTPATIENT
Start: 2025-01-27

## 2025-03-13 ENCOUNTER — MYC MEDICAL ADVICE (OUTPATIENT)
Dept: GASTROENTEROLOGY | Facility: CLINIC | Age: 45
End: 2025-03-13

## 2025-05-12 ENCOUNTER — MEDICAL CORRESPONDENCE (OUTPATIENT)
Dept: HEALTH INFORMATION MANAGEMENT | Facility: CLINIC | Age: 45
End: 2025-05-12
Payer: COMMERCIAL

## 2025-05-28 RX ORDER — LIDOCAINE 40 MG/G
CREAM TOPICAL
Status: CANCELLED | OUTPATIENT
Start: 2025-05-28

## 2025-05-28 RX ORDER — ONDANSETRON 2 MG/ML
4 INJECTION INTRAMUSCULAR; INTRAVENOUS
Status: CANCELLED | OUTPATIENT
Start: 2025-05-28

## 2025-06-05 ENCOUNTER — ANESTHESIA EVENT (OUTPATIENT)
Dept: GASTROENTEROLOGY | Facility: CLINIC | Age: 45
End: 2025-06-05
Payer: COMMERCIAL

## 2025-06-05 ENCOUNTER — ANESTHESIA (OUTPATIENT)
Dept: GASTROENTEROLOGY | Facility: CLINIC | Age: 45
End: 2025-06-05
Payer: COMMERCIAL

## 2025-06-05 ENCOUNTER — HOSPITAL ENCOUNTER (OUTPATIENT)
Facility: CLINIC | Age: 45
Discharge: HOME | End: 2025-06-05
Attending: INTERNAL MEDICINE | Admitting: INTERNAL MEDICINE
Payer: COMMERCIAL

## 2025-06-05 VITALS
HEART RATE: 77 BPM | DIASTOLIC BLOOD PRESSURE: 64 MMHG | RESPIRATION RATE: 21 BRPM | WEIGHT: 235 LBS | SYSTOLIC BLOOD PRESSURE: 123 MMHG | BODY MASS INDEX: 33.64 KG/M2 | HEIGHT: 70 IN | OXYGEN SATURATION: 97 %

## 2025-06-05 LAB — COLONOSCOPY: NORMAL

## 2025-06-05 PROCEDURE — 250N000011 HC RX IP 250 OP 636: Performed by: NURSE ANESTHETIST, CERTIFIED REGISTERED

## 2025-06-05 PROCEDURE — 250N000009 HC RX 250: Performed by: NURSE ANESTHETIST, CERTIFIED REGISTERED

## 2025-06-05 PROCEDURE — 370N000017 HC ANESTHESIA TECHNICAL FEE, PER MIN: Performed by: INTERNAL MEDICINE

## 2025-06-05 PROCEDURE — 258N000003 HC RX IP 258 OP 636: Performed by: NURSE ANESTHETIST, CERTIFIED REGISTERED

## 2025-06-05 PROCEDURE — 45385 COLONOSCOPY W/LESION REMOVAL: CPT | Mod: PT | Performed by: INTERNAL MEDICINE

## 2025-06-05 PROCEDURE — 999N000010 HC STATISTIC ANES STAT CODE-CRNA PER MINUTE: Performed by: INTERNAL MEDICINE

## 2025-06-05 PROCEDURE — 88305 TISSUE EXAM BY PATHOLOGIST: CPT | Mod: 26 | Performed by: PATHOLOGY

## 2025-06-05 PROCEDURE — 88305 TISSUE EXAM BY PATHOLOGIST: CPT | Mod: TC | Performed by: INTERNAL MEDICINE

## 2025-06-05 RX ORDER — TIRZEPATIDE 5 MG/.5ML
INJECTION, SOLUTION SUBCUTANEOUS
COMMUNITY
Start: 2025-05-16

## 2025-06-05 RX ORDER — ONDANSETRON 2 MG/ML
INJECTION INTRAMUSCULAR; INTRAVENOUS PRN
Status: DISCONTINUED | OUTPATIENT
Start: 2025-06-05 | End: 2025-06-05

## 2025-06-05 RX ORDER — SODIUM CHLORIDE, SODIUM LACTATE, POTASSIUM CHLORIDE, CALCIUM CHLORIDE 600; 310; 30; 20 MG/100ML; MG/100ML; MG/100ML; MG/100ML
INJECTION, SOLUTION INTRAVENOUS CONTINUOUS PRN
Status: DISCONTINUED | OUTPATIENT
Start: 2025-06-05 | End: 2025-06-05

## 2025-06-05 RX ORDER — PROPOFOL 10 MG/ML
INJECTION, EMULSION INTRAVENOUS CONTINUOUS PRN
Status: DISCONTINUED | OUTPATIENT
Start: 2025-06-05 | End: 2025-06-05

## 2025-06-05 RX ORDER — PROPOFOL 10 MG/ML
INJECTION, EMULSION INTRAVENOUS PRN
Status: DISCONTINUED | OUTPATIENT
Start: 2025-06-05 | End: 2025-06-05

## 2025-06-05 RX ORDER — LIDOCAINE HYDROCHLORIDE 20 MG/ML
INJECTION, SOLUTION INFILTRATION; PERINEURAL PRN
Status: DISCONTINUED | OUTPATIENT
Start: 2025-06-05 | End: 2025-06-05

## 2025-06-05 RX ADMIN — PROPOFOL 30 MG: 10 INJECTION, EMULSION INTRAVENOUS at 13:17

## 2025-06-05 RX ADMIN — LIDOCAINE HYDROCHLORIDE 40 MG: 20 INJECTION, SOLUTION INFILTRATION; PERINEURAL at 13:15

## 2025-06-05 RX ADMIN — PROPOFOL 20 MG: 10 INJECTION, EMULSION INTRAVENOUS at 13:28

## 2025-06-05 RX ADMIN — PROPOFOL 20 MG: 10 INJECTION, EMULSION INTRAVENOUS at 13:22

## 2025-06-05 RX ADMIN — SODIUM CHLORIDE, SODIUM LACTATE, POTASSIUM CHLORIDE, AND CALCIUM CHLORIDE: .6; .31; .03; .02 INJECTION, SOLUTION INTRAVENOUS at 13:12

## 2025-06-05 RX ADMIN — PROPOFOL 100 MCG/KG/MIN: 10 INJECTION, EMULSION INTRAVENOUS at 13:15

## 2025-06-05 RX ADMIN — ONDANSETRON 4 MG: 2 INJECTION INTRAMUSCULAR; INTRAVENOUS at 13:42

## 2025-06-05 RX ADMIN — DEXMEDETOMIDINE HYDROCHLORIDE 12 MCG: 100 INJECTION, SOLUTION INTRAVENOUS at 13:15

## 2025-06-05 RX ADMIN — PROPOFOL 20 MG: 10 INJECTION, EMULSION INTRAVENOUS at 13:32

## 2025-06-05 ASSESSMENT — ACTIVITIES OF DAILY LIVING (ADL)
ADLS_ACUITY_SCORE: 41

## 2025-06-05 NOTE — ANESTHESIA PREPROCEDURE EVALUATION
Anesthesia Pre-Procedure Evaluation    Patient: Katty Mendoza   MRN: [id number] : 1980          Procedure : Procedure(s):  COLONOSCOPY         Past Medical History:   Diagnosis Date    Acne     Atopic dermatitis     Colon polyps     History of COVID-19     History of ovarian cyst     Seasonal allergic rhinitis       Past Surgical History:   Procedure Laterality Date    APPENDECTOMY  2002    Dr. Villalta    BREAST BIOPSY, RT/LT Right 2008    times 2; fibroadenoma    BREAST SURGERY  ,    COLONOSCOPY N/A 2019    Procedure: COMBINED COLONOSCOPY, SINGLE OR MULTIPLE BIOPSY/POLYPECTOMY BY BIOPSY;  Surgeon: Juan Miguel Guan MD;  [facility]    COLONOSCOPY N/A 2020    Procedure: COLONOSCOPY, WITH POLYPECTOMY AND BIOPSY;  Surgeon: Juan Miguel Guan MD;  [facility]    COLONOSCOPY N/A 2021    Procedure: COLONOSCOPY, WITH POLYPECTOMY AND BIOPSY;  Surgeon: Juan Miguel Guan MD;  [facility]    COLONOSCOPY N/A 3/24/2022    Procedure: COLONOSCOPY, FLEXIBLE, WITH LESION REMOVAL USING SNARE;  Surgeon: Juan Miguel Guan MD;  [facility]    left adnexal cystectomy Left 2002    Dr. Villalta    SALPINGO OOPHORECTOMY,R/L/BOBBY Right     complete Dr. Villalta      No Known Allergies   Social History     Tobacco Use    Smoking status: Never    Smokeless tobacco: Never   Substance Use Topics    Alcohol use: Yes     Alcohol/week: 0.8 - 1.7 standard drinks of alcohol     Comment: occas       Wt Readings from Last 1 Encounters:   23 104.1 kg (229 lb 9.6 oz)        Anesthesia Evaluation            ROS/MED HX  ENT/Pulmonary:     (+)           allergic rhinitis,                             Neurologic:  - neg neurologic ROS     Cardiovascular:  - neg cardiovascular ROS     METS/Exercise Tolerance:     Hematologic:  - neg hematologic  ROS     Musculoskeletal:  - neg musculoskeletal ROS     GI/Hepatic: Comment: Zepbound - last dose >1wk      Renal/Genitourinary:  - neg Renal ROS     Endo: Comment:  "PreDM    (+)               Obesity,       Psychiatric/Substance Use:     (+) psychiatric history anxiety and depression       Infectious Disease:       Malignancy:       Other:              Physical Exam  Airway  Mallampati: II  TM distance: >3 FB  Neck ROM: full    Cardiovascular - normal exam   Dental   (+) Minor Abnormalities - some fillings, tiny chips      Pulmonary - normal exam      Neurological - normal exam  She appears awake, alert and oriented x3.    Other Findings       OUTSIDE LABS:  CBC:   Lab Results   Component Value Date    WBC 5.8 03/15/2019    HGB 13.8 03/15/2019    HCT 41.5 03/15/2019     03/15/2019     BMP:   Lab Results   Component Value Date    GLC 87 07/06/2018     COAGS: No results found for: \"PTT\", \"INR\", \"FIBR\"  POC:   Lab Results   Component Value Date     (H) 07/18/2019    HCG Negative 07/18/2019     HEPATIC: No results found for: \"ALBUMIN\", \"PROTTOTAL\", \"ALT\", \"AST\", \"GGT\", \"ALKPHOS\", \"BILITOTAL\", \"BILIDIRECT\", \"MARIA\"  OTHER:   Lab Results   Component Value Date    A1C 5.8 (H) 05/19/2023    TSH 1.82 05/19/2023       Anesthesia Plan    ASA Status:  2      NPO Status: NPO Appropriate   Anesthesia Type: MAC.  Airway: natural airway.  Maintenance: TIVA.   Techniques and Equipment:       - Monitoring Plan: standard ASA monitoring     Consents    Anesthesia Plan(s) and associated risks, benefits, and realistic alternatives discussed. Questions answered and patient/representative(s) expressed understanding.     - Discussed: CRNA     - Discussed with:                Postoperative Care    Pain management: multimodal analgesia.     Comments:                   Pedro Alvarenga, , DO    I have reviewed the pertinent notes and labs in the chart from the past 30 days and (re)examined the patient.  Any updates or changes from those notes are reflected in this note.    Clinically Significant Risk Factors Present on Admission                                              "

## 2025-06-05 NOTE — ANESTHESIA POSTPROCEDURE EVALUATION
Patient: Katty Mendoza    Procedure: Procedure(s):  COLONOSCOPY, FLEXIBLE, WITH LESION REMOVAL USING SNARE       Anesthesia Type:  MAC    Note:     Postop Pain Control: Uneventful            Sign Out: Well controlled pain   PONV: No   Neuro/Psych: Uneventful            Sign Out: Acceptable/Baseline neuro status   Airway/Respiratory: Uneventful            Sign Out: Acceptable/Baseline resp. status   CV/Hemodynamics: Uneventful            Sign Out: Acceptable CV status; No obvious hypovolemia; No obvious fluid overload   Other NRE: NONE   DID A NON-ROUTINE EVENT OCCUR?        Last vitals:  Vitals Value Taken Time   /64 06/05/25 14:02   Temp     Pulse 61 06/05/25 14:07   Resp 44 06/05/25 14:07   SpO2 99 % 06/05/25 14:07   Vitals shown include unfiled device data.    Electronically Signed By: Pedro Alvarenga DO, DO  June 5, 2025  3:28 PM

## 2025-06-05 NOTE — ANESTHESIA CARE TRANSFER NOTE
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    Anesthesia Type:   MAC     Note:    Oropharynx: oropharynx clear of all foreign objects and spontaneously breathing  Level of Consciousness: awake  Oxygen Supplementation: room air    Independent Airway: airway patency satisfactory and stable  Dentition: dentition unchanged  Vital Signs Stable: post-procedure vital signs reviewed and stable  Report to RN Given: handoff report given  Destination: endoscopy 35.  Comments: After procedure in [facility] under monitored anesthesia care (MAC), patient exhibited spontaneous respirations, patient breathing room air with oxygen saturation maintained greater than 98%, patient brought to [facility] recovery bay for postprocedure recovery, SpO2, NiBP, and EKG monitors and alarms on and functioning, report on patient's clinical status given to recovery-trained endoscopy RN, RN questions answered.          Vitals:  Vitals Value Taken Time   BP     Temp     Pulse     Resp     SpO2         Electronically Signed By: GREGORIO Loya CRNA  June 5, 2025  1:49 PM

## 2025-06-16 ENCOUNTER — RESULTS FOLLOW-UP (OUTPATIENT)
Dept: MULTI SPECIALTY CLINIC | Facility: CLINIC | Age: 45
End: 2025-06-16

## 2025-08-02 ENCOUNTER — HEALTH MAINTENANCE LETTER (OUTPATIENT)
Age: 45
End: 2025-08-02

## (undated) DEVICE — TUBING SUCTION 12"X1/4" N612

## (undated) DEVICE — ORISE GEL

## (undated) DEVICE — GOWN IMPERVIOUS 2XL BLUE

## (undated) DEVICE — CAPTIVATOR COLD THIN WIRE

## (undated) DEVICE — DEVICE RETRIEVAL ROTH NET PLATINUM UNIV 2.5MMX230CM 00715050

## (undated) DEVICE — SOL WATER IRRIG 1000ML BOTTLE 2F7114

## (undated) DEVICE — SNARE CAPIVATOR ROUND COLD SNR BX10 M00561101

## (undated) DEVICE — ENDO TRAP POLYP E-TRAP 00711099

## (undated) DEVICE — ATTACHMENT CAP DISTAL REVEAL 15.0MM BX00711774

## (undated) DEVICE — KIT ENDO FIRST STEP DISINFECTANT 200ML W/POUCH EP-4

## (undated) DEVICE — ENDO TUBING CO2 SMARTCAP STERILE DISP 100145CO2EXT

## (undated) DEVICE — ENDO CAP AND TUBING STERILE FOR ENDOGATOR  100130

## (undated) DEVICE — WIPE PREMOIST CLEANSING WASHCLOTHS 7988

## (undated) DEVICE — CLIP ENDO RESOLUTION 360 2.8,,X235CM M00521230

## (undated) DEVICE — SPECIMEN CONTAINER 3OZ W/FORMALIN 59901

## (undated) DEVICE — PAD CHUX UNDERPAD 23X24" 7136

## (undated) DEVICE — SUCTION MANIFOLD NEPTUNE 2 SYS 1 PORT 702-025-000

## (undated) DEVICE — FCP BIOPSY RADIAL JAW 4 JUMBO 3.2MM CHANNEL M00513370

## (undated) DEVICE — PACK ENDOSCOPY GI CUSTOM UMMC

## (undated) DEVICE — SPECIMEN TRAP POLYP 4 CHAMBER EZ710202 EZ710202

## (undated) DEVICE — KIT CONNECTOR FOR OLYMPUS ENDOSCOPES DEFENDO 100310

## (undated) RX ORDER — SIMETHICONE 40MG/0.6ML
SUSPENSION, DROPS(FINAL DOSAGE FORM)(ML) ORAL
Status: DISPENSED
Start: 2019-04-22

## (undated) RX ORDER — FENTANYL CITRATE 50 UG/ML
INJECTION, SOLUTION INTRAMUSCULAR; INTRAVENOUS
Status: DISPENSED
Start: 2021-01-25

## (undated) RX ORDER — LIDOCAINE HYDROCHLORIDE 20 MG/ML
INJECTION, SOLUTION EPIDURAL; INFILTRATION; INTRACAUDAL; PERINEURAL
Status: DISPENSED
Start: 2019-07-18

## (undated) RX ORDER — ONDANSETRON 2 MG/ML
INJECTION INTRAMUSCULAR; INTRAVENOUS
Status: DISPENSED
Start: 2019-07-18

## (undated) RX ORDER — PHENYLEPHRINE HCL IN 0.9% NACL 1 MG/10 ML
SYRINGE (ML) INTRAVENOUS
Status: DISPENSED
Start: 2019-07-18

## (undated) RX ORDER — FENTANYL CITRATE 50 UG/ML
INJECTION, SOLUTION INTRAMUSCULAR; INTRAVENOUS
Status: DISPENSED
Start: 2019-04-22

## (undated) RX ORDER — PROPOFOL 10 MG/ML
INJECTION, EMULSION INTRAVENOUS
Status: DISPENSED
Start: 2019-07-18

## (undated) RX ORDER — EPHEDRINE SULFATE 50 MG/ML
INJECTION, SOLUTION INTRAMUSCULAR; INTRAVENOUS; SUBCUTANEOUS
Status: DISPENSED
Start: 2019-07-18

## (undated) RX ORDER — FENTANYL CITRATE 50 UG/ML
INJECTION, SOLUTION INTRAMUSCULAR; INTRAVENOUS
Status: DISPENSED
Start: 2020-02-24

## (undated) RX ORDER — FENTANYL CITRATE 50 UG/ML
INJECTION, SOLUTION INTRAMUSCULAR; INTRAVENOUS
Status: DISPENSED
Start: 2019-07-18

## (undated) RX ORDER — EPINEPHRINE 1 MG/ML
INJECTION, SOLUTION, CONCENTRATE INTRAVENOUS
Status: DISPENSED
Start: 2019-04-22